# Patient Record
Sex: FEMALE | Race: WHITE | NOT HISPANIC OR LATINO | URBAN - METROPOLITAN AREA
[De-identification: names, ages, dates, MRNs, and addresses within clinical notes are randomized per-mention and may not be internally consistent; named-entity substitution may affect disease eponyms.]

---

## 2017-04-14 ENCOUNTER — OFFICE VISIT (OUTPATIENT)
Dept: FAMILY MEDICINE | Facility: CLINIC | Age: 27
End: 2017-04-14
Payer: COMMERCIAL

## 2017-04-14 VITALS
TEMPERATURE: 98.1 F | HEIGHT: 65 IN | DIASTOLIC BLOOD PRESSURE: 70 MMHG | WEIGHT: 139 LBS | SYSTOLIC BLOOD PRESSURE: 122 MMHG | BODY MASS INDEX: 23.16 KG/M2 | HEART RATE: 66 BPM

## 2017-04-14 DIAGNOSIS — Z00.00 ROUTINE GENERAL MEDICAL EXAMINATION AT A HEALTH CARE FACILITY: Primary | ICD-10-CM

## 2017-04-14 DIAGNOSIS — Z30.41 ENCOUNTER FOR SURVEILLANCE OF CONTRACEPTIVE PILLS: ICD-10-CM

## 2017-04-14 DIAGNOSIS — R53.83 FATIGUE, UNSPECIFIED TYPE: ICD-10-CM

## 2017-04-14 LAB
ERYTHROCYTE [DISTWIDTH] IN BLOOD BY AUTOMATED COUNT: 12.2 % (ref 10–15)
HCT VFR BLD AUTO: 40.2 % (ref 35–47)
HGB BLD-MCNC: 13.8 G/DL (ref 11.7–15.7)
MCH RBC QN AUTO: 31.1 PG (ref 26.5–33)
MCHC RBC AUTO-ENTMCNC: 34.3 G/DL (ref 31.5–36.5)
MCV RBC AUTO: 91 FL (ref 78–100)
PLATELET # BLD AUTO: 262 10E9/L (ref 150–450)
RBC # BLD AUTO: 4.44 10E12/L (ref 3.8–5.2)
WBC # BLD AUTO: 6.8 10E9/L (ref 4–11)

## 2017-04-14 PROCEDURE — 99395 PREV VISIT EST AGE 18-39: CPT | Performed by: FAMILY MEDICINE

## 2017-04-14 PROCEDURE — 36415 COLL VENOUS BLD VENIPUNCTURE: CPT | Performed by: FAMILY MEDICINE

## 2017-04-14 PROCEDURE — 84443 ASSAY THYROID STIM HORMONE: CPT | Performed by: FAMILY MEDICINE

## 2017-04-14 PROCEDURE — 85027 COMPLETE CBC AUTOMATED: CPT | Performed by: FAMILY MEDICINE

## 2017-04-14 PROCEDURE — 82306 VITAMIN D 25 HYDROXY: CPT | Performed by: FAMILY MEDICINE

## 2017-04-14 NOTE — NURSING NOTE
"Chief Complaint   Patient presents with     Physical       Initial /70 (BP Location: Right arm, Cuff Size: Adult Regular)  Pulse 66  Temp 98.1  F (36.7  C) (Oral)  Ht 5' 4.5\" (1.638 m)  Wt 139 lb (63 kg)  LMP 04/09/2017  BMI 23.49 kg/m2 Estimated body mass index is 23.49 kg/(m^2) as calculated from the following:    Height as of this encounter: 5' 4.5\" (1.638 m).    Weight as of this encounter: 139 lb (63 kg).  Medication Reconciliation: stefan RANDALL, Certified Medical Assistant (AAMA)April 14, 2017 2:08 PM      "

## 2017-04-14 NOTE — PROGRESS NOTES
SUBJECTIVE:     CC: Martine Do is an 26 year old woman who presents for preventive health visit.     Healthy Habits:    Do you get at least three servings of calcium containing foods daily (dairy, green leafy vegetables, etc.)? yes    Amount of exercise or daily activities, outside of work: 3-4 day(s) per week, roller derby 3 times a week, yoga, josie    Problems taking medications regularly No    Medication side effects: No    Have you had an eye exam in the past two years? yes    Do you see a dentist twice per year? yes    Do you have sleep apnea, excessive snoring or daytime drowsiness?yes    Wants to find out if she has any vitamin deficiencies.  Refill birth control.    Just got engaged last week!  Planning a fall or spring wedding.    Birth Control  Patient reports that her birth control switched from ortho cyclen to nortrel (same hormone composition just different manfacturer), period is longer and she gets worse cramps. She has been on this birth control for the past couple of months.     Fatigue  For several years, she reports feeling of tiredness. 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 She reports that sometimes she feels the need to sleep long hours during the day. Other times, however, she feels like she has a lot of energy. She hasn't noticed a pattern or what brings it on. When she sleeps, she mostly feels well rested when she wakes.     Today's PHQ-2 Score:   PHQ-2 ( 1999 [employer]) 4/14/2017 3/4/2016   Q1: Little interest or pleasure in doing things 0 0   Q2: Feeling down, depressed or hopeless 0 0   PHQ-2 Score 0 0       Abuse: Current or Past(Physical, Sexual or Emotional)- No  Do you feel safe in your environment - Yes    Social History   Substance Use Topics     Smoking status: Never Smoker     Smokeless tobacco: Never Used     Alcohol use Yes      Comment: 3 drinks per week     The patient does not drink >3 drinks per  day nor >7 drinks per week.    Recent Labs   Lab Test  11/04/13   0758   CHOL  172   HDL  49*   LDL  102   TRIG  104   CHOLHDLRATIO  3.5       Reviewed orders with patient.  Reviewed health maintenance and updated orders accordingly - Yes    Mammo Decision Support:  Mammogram not appropriate for this patient based on age.    Pertinent mammograms are reviewed under the imaging tab.  History of abnormal Pap smear: NO - age 21-29 PAP every 3 years recommended    Reviewed and updated as needed this visit by clinical staff  Tobacco  Allergies  Med Hx  Surg Hx  Fam Hx  Soc Hx        Reviewed and updated as needed this visit by Provider            ROS:  C: NEGATIVE for fever, chills, change in weight  I: NEGATIVE for worrisome rashes, moles or lesions  E: NEGATIVE for vision changes or irritation  ENT: NEGATIVE for ear, mouth and throat problems  R: NEGATIVE for significant cough or SOB  B: NEGATIVE for masses, tenderness or discharge  CV: NEGATIVE for chest pain, palpitations or peripheral edema  GI: NEGATIVE for nausea, abdominal pain, heartburn, or change in bowel habits  : NEGATIVE for unusual urinary or vaginal symptoms. Periods are regular.  M: NEGATIVE for significant arthralgias or myalgia  N: NEGATIVE for weakness, dizziness or paresthesias  P: NEGATIVE for changes in mood or affect    This document serves as a record of the services and decisions personally performed and made by Niharika Stewart MD. It was created on her/his behalf by Kenna Mckeon, a trained medical scribe. The creation of this document is based the provider's statements to the medical scribe.  Kenna Mckeon April 14, 2017 2:24 PM    Problem list, Medication list, Allergies, and Medical/Social/Surgical histories reviewed in [facility] and updated as appropriate.  BP Readings from Last 3 Encounters:   04/14/17 122/70   03/04/16 112/68   12/10/14 104/64    Wt Readings from Last 3 Encounters:   04/14/17 63 kg (139 lb)   03/04/16 63.7 kg (140 lb  "6.4 oz)   12/10/14 61.2 kg (135 lb)         OBJECTIVE:     /70 (BP Location: Right arm, Cuff Size: Adult Regular)  Pulse 66  Temp 98.1  F (36.7  C) (Oral)  Ht 1.638 m (5' 4.5\")  Wt 63 kg (139 lb)  LMP 04/09/2017  BMI 23.49 kg/m2  EXAM:  GENERAL: healthy, alert and no distress  EYES: Eyes grossly normal to inspection, PERRL and conjunctivae and sclerae normal  HENT: ear canals and TM's normal, nose and mouth without ulcers or lesions  NECK: no adenopathy, no asymmetry, masses, or scars and thyroid normal to palpation  RESP: lungs clear to auscultation - no rales, rhonchi or wheezes  BREAST: normal without masses, tenderness or nipple discharge and no palpable axillary masses or adenopathy  CV: regular rate and rhythm, normal S1 S2, no S3 or S4, no murmur, click or rub, no peripheral edema and peripheral pulses strong  ABDOMEN: soft, nontender, no hepatosplenomegaly, no masses and bowel sounds normal  MS: no gross musculoskeletal defects noted, no edema  SKIN: no suspicious lesions or rashes  NEURO: Normal strength and tone, mentation intact and speech normal  PSYCH: mentation appears normal, affect normal/bright    ASSESSMENT/PLAN:     1. Routine general medical examination at a health care facility  Healthy.   Other health care maintenance up to date per chart or patient report.    - norethindrone-ethinyl estradiol (ORTHO-NOVUM 1-35 TAB,NORTREL 1-35 TAB) 1-35 MG-MCG per tablet; Take 1 tablet by mouth daily  Dispense: 84 tablet; Refill: 4    2. Encounter for surveillance of contraceptive pills  Tolerating ocp's, although new brand has caused periods to be longer.  Advised to call in 1-2 more months if still unsatisfied and we can consider brand only with ortho cyclen.    3. Fatigue, unspecified type  Wonder if this may be sleep insufficiency or menstrual cycle related.  adjust therapy based on labs  Otherwise consider the above potential causes  - CBC with platelets  - TSH with free T4 reflex  - Vitamin D " "Deficiency      COUNSELING:   Reviewed preventive health counseling, as reflected in patient instructions       Regular exercise     reports that she has never smoked. She has never used smokeless tobacco.    Estimated body mass index is 23.49 kg/(m^2) as calculated from the following:    Height as of this encounter: 1.638 m (5' 4.5\").    Weight as of this encounter: 63 kg (139 lb).     Counseling Resources:  ATP IV Guidelines  Pooled Cohorts Equation Calculator  Breast Cancer Risk Calculator  FRAX Risk Assessment  ICSI Preventive Guidelines  Dietary Guidelines for Americans, 2010  USDA's MyPlate  ASA Prophylaxis  Lung CA Screening    Niharika Cortes MD  [facility]    This document serves as a record of the services and decisions personally performed and made by Niharika Cortes MD. It was created on her/his behalf by Kenna Mckeon, a trained medical scribe. The creation of this document is based the provider's statements to the medical scribe.  Kenna Mckeon April 14, 2017 2:24 PM      "

## 2017-04-14 NOTE — LETTER
[address] [address] [phone]      April 18, 2017      Martine Do  [address] [place] [id number]          Dear Ms. Do    The results of your recent lab tests were within normal limits. Enclosed is a copy of these results.  If you have any further questions or problems, please contact our office.  Results for orders placed or performed in visit on 04/14/17   CBC with platelets   Result Value Ref Range    WBC 6.8 4.0 - 11.0 10e9/L    RBC Count 4.44 3.8 - 5.2 10e12/L    Hemoglobin 13.8 11.7 - 15.7 g/dL    Hematocrit 40.2 35.0 - 47.0 %    MCV 91 78 - 100 fl    MCH 31.1 26.5 - 33.0 pg    MCHC 34.3 31.5 - 36.5 g/dL    RDW 12.2 10.0 - 15.0 %    Platelet Count 262 150 - 450 10e9/L   TSH with free T4 reflex   Result Value Ref Range    TSH 1.47 0.40 - 4.00 mU/L   Vitamin D Deficiency   Result Value Ref Range    Vitamin D Deficiency screening 47 20 - 75 ug/L       Sincerely,      Shazia Stewart MD/dell

## 2017-04-14 NOTE — MR AVS SNAPSHOT
After Visit Summary   4/14/2017    Martine Do    MRN: [id number]           Patient Information     Date Of Birth          1990        Visit Information        Provider Department      4/14/2017 2:00 PM Niharika Cortes MD [facility]        Today's Diagnoses     Routine general medical examination at a health care facility    -  1    Encounter for surveillance of contraceptive pills        Fatigue, unspecified type          Care Instructions    If your lab tests are normal, you may want to track and see if the increased fatigue is occurring the week before your period.      Preventive Health Recommendations  Female Ages 26 - 39  Yearly exam:   See your health care provider every year in order to    Review health changes.     Discuss preventive care.      Review your medicines if you your doctor has prescribed any.    Until age 30: Get a Pap test every three years (more often if you have had an abnormal result).    After age 30: Talk to your doctor about whether you should have a Pap test every 3 years or have a Pap test with HPV screening every 5 years.   You do not need a Pap test if your uterus was removed (hysterectomy) and you have not had cancer.  You should be tested each year for STDs (sexually transmitted diseases), if you're at risk.   Talk to your provider about how often to have your cholesterol checked.  If you are at risk for diabetes, you should have a diabetes test (fasting glucose).  Shots: Get a flu shot each year. Get a tetanus shot every 10 years.   Nutrition:     Eat at least 5 servings of fruits and vegetables each day.    Eat whole-grain bread, whole-wheat pasta and brown rice instead of white grains and rice.    Talk to your provider about Calcium and Vitamin D.     Lifestyle    Exercise at least 150 minutes a week (30 minutes a day, 5 days of the week). This will help you control your weight and prevent disease.    Limit alcohol to one drink per  "day.    No smoking.     Wear sunscreen to prevent skin cancer.    See your dentist every six months for an exam and cleaning.            Follow-ups after your visit        Who to contact     If you have questions or need follow up information about today's clinic visit or your schedule please contact [facility] directly at [phone].  Normal or non-critical lab and imaging results will be communicated to you by MyChart, letter or phone within 4 business days after the clinic has received the results. If you do not hear from us within 7 days, please contact the clinic through [employer] or phone. If you have a critical or abnormal lab result, we will notify you by phone as soon as possible.  Submit refill requests through [employer] or call your pharmacy and they will forward the refill request to us. Please allow 3 business days for your refill to be completed.          Additional Information About Your Visit        [employer] Information     [employer] lets you send messages to your doctor, view your test results, renew your prescriptions, schedule appointments and more. To sign up, go to [email or web address] . Click on \"Log in\" on the left side of the screen, which will take you to the Welcome page. Then click on \"Sign up Now\" on the right side of the page.     You will be asked to enter the access code listed below, as well as some personal information. Please follow the directions to create your username and password.     Your access code is: [email or web address]  Expires: 2017  3:16 PM     Your access code will  in 90 days. If you need help or a new code, please call your [facility] clinic or [phone].        Care EveryWhere ID     This is your Care EveryWhere ID. This could be used by other organizations to access your [facility] medical records  [id number]        Your Vitals Were     Pulse Temperature Height Last Period BMI (Body Mass Index)       66 98.1  F (36.7  C) (Oral) 5' 4.5\" (1.638 " m) 04/09/2017 23.49 kg/m2        Blood Pressure from Last 3 Encounters:   04/14/17 122/70   03/04/16 112/68   12/10/14 104/64    Weight from Last 3 Encounters:   04/14/17 139 lb (63 kg)   03/04/16 140 lb 6.4 oz (63.7 kg)   12/10/14 135 lb (61.2 kg)              We Performed the Following     CBC with platelets     TSH with free T4 reflex     Vitamin D Deficiency          Where to get your medicines      These medications were sent to [facility] - [place], [place] - [address]  [address] [place] [address]     Phone:  [phone]     norethindrone-ethinyl estradiol 1-35 MG-MCG per tablet          Primary Care Provider Office Phone # Fax #    Niharika Cortes [phone]       [facility] [address] [facility] [address]        Thank you!     Thank you for choosing [facility]  for your care. Our goal is always to provide you with excellent care. Hearing back from our patients is one way we can continue to improve our services. Please take a few minutes to complete the written survey that you may receive in the mail after your visit with us. Thank you!             Your Updated Medication List - Protect others around you: Learn how to safely use, store and throw away your medicines at www.disposemymeds.org.          This list is accurate as of: 4/14/17  3:16 PM.  Always use your most recent med list.                   Brand Name Dispense Instructions for use    norethindrone-ethinyl estradiol 1-35 MG-MCG per tablet    ORTHO-NOVUM 1-35 TAB,NORTREL 1-35 TAB    84 tablet    Take 1 tablet by mouth daily

## 2017-04-14 NOTE — PATIENT INSTRUCTIONS
If your lab tests are normal, you may want to track and see if the increased fatigue is occurring the week before your period.      Preventive Health Recommendations  Female Ages 26 - 39  Yearly exam:   See your health care provider every year in order to    Review health changes.     Discuss preventive care.      Review your medicines if you your doctor has prescribed any.    Until age 30: Get a Pap test every three years (more often if you have had an abnormal result).    After age 30: Talk to your doctor about whether you should have a Pap test every 3 years or have a Pap test with HPV screening every 5 years.   You do not need a Pap test if your uterus was removed (hysterectomy) and you have not had cancer.  You should be tested each year for STDs (sexually transmitted diseases), if you're at risk.   Talk to your provider about how often to have your cholesterol checked.  If you are at risk for diabetes, you should have a diabetes test (fasting glucose).  Shots: Get a flu shot each year. Get a tetanus shot every 10 years.   Nutrition:     Eat at least 5 servings of fruits and vegetables each day.    Eat whole-grain bread, whole-wheat pasta and brown rice instead of white grains and rice.    Talk to your provider about Calcium and Vitamin D.     Lifestyle    Exercise at least 150 minutes a week (30 minutes a day, 5 days of the week). This will help you control your weight and prevent disease.    Limit alcohol to one drink per day.    No smoking.     Wear sunscreen to prevent skin cancer.    See your dentist every six months for an exam and cleaning.

## 2017-04-15 LAB — TSH SERPL DL<=0.005 MIU/L-ACNC: 1.47 MU/L (ref 0.4–4)

## 2017-04-17 LAB — DEPRECATED CALCIDIOL+CALCIFEROL SERPL-MC: 47 UG/L (ref 20–75)

## 2017-07-27 ENCOUNTER — OFFICE VISIT (OUTPATIENT)
Dept: PODIATRY | Facility: CLINIC | Age: 27
End: 2017-07-27
Payer: COMMERCIAL

## 2017-07-27 ENCOUNTER — RADIANT APPOINTMENT (OUTPATIENT)
Dept: GENERAL RADIOLOGY | Facility: CLINIC | Age: 27
End: 2017-07-27
Attending: PODIATRIST
Payer: COMMERCIAL

## 2017-07-27 VITALS — BODY MASS INDEX: 22.49 KG/M2 | HEART RATE: 70 BPM | WEIGHT: 135 LBS | HEIGHT: 65 IN

## 2017-07-27 DIAGNOSIS — S90.32XA CONTUSION OF LEFT FOOT, INITIAL ENCOUNTER: ICD-10-CM

## 2017-07-27 DIAGNOSIS — S99.922A INJURY OF LEFT FOOT, INITIAL ENCOUNTER: Primary | ICD-10-CM

## 2017-07-27 PROCEDURE — 99203 OFFICE O/P NEW LOW 30 MIN: CPT | Performed by: PODIATRIST

## 2017-07-27 PROCEDURE — 73630 X-RAY EXAM OF FOOT: CPT | Mod: LT

## 2017-07-27 NOTE — LETTER
2017         RE: Martine Do  [address] [place] [id number]        Dear Colleague,    Thank you for referring your patient, Martine Do, to [facility]. Please see a copy of my visit note below.      ASSESSMENT/PLAN:    Encounter Diagnoses   Name Primary?     Injury of left foot, initial encounter Yes     Contusion of left foot, initial encounter      We reviewed the x-rays together and I discussed the regional anatomy.     Recommendations:    Cold application  Supportive athletic shoes until pain is resolved.  Trilok ankle brace    Follow up if pain persists or worsens.      Body mass index is 22.81 kg/(m^2).    Stanley Ojeda, AALIYAH, FACFAS, MS    [place] [facility] Pod[facility]/Foot & Ankle [facility]      ____________________________________________________________________    HPI:         Chief Complaint: left foot injury; concerned for broken left foot  Onset of problem: 3.5 days:  She was playing softball, fielding a ball and it hit her left foot over the lateral aspect.  She is concerned, because she also feels some pain in the plantar arch.   Pain/ discomfort is described as:  Ache, shotting  Ratin/10   Frequency:  daily    The pain is made worse with walking, movement  Previous treatment: ice, foot soaks -Epsom salt    *  Past Medical History:   Diagnosis Date     Other acne    *  *  Past Surgical History:   Procedure Laterality Date     NO HISTORY OF SURGERY     *  *  Current Outpatient Prescriptions   Medication Sig Dispense Refill     norethindrone-ethinyl estradiol (ORTHO-NOVUM 1-35 TAB,NORTREL 1-35 TAB) 1-35 MG-MCG per tablet Take 1 tablet by mouth daily 84 tablet 4       ROS:     A 10-point review of systems was performed and is positive for that noted in the HPI and as seen below.  All other areas are negative.     Numbness in feet?  yes   Calf pain with walking? no  Recent foot/ankle injury? See HPI  Weight change over past 12 months? no  Self perception as  "overweight? no  Recent flu-like symptoms? no  Joint pain other than feet ? no    Social History: Employment:  , ;  Exercise/Physical activity:  Roller derby, softball, gym, yoga;  Tobacco use:  no  Social History     Social History     Marital status: Single     Spouse name: N/A     Number of children: N/A     Years of education: N/A     Occupational History     Not on file.     Social History Main Topics     Smoking status: Never Smoker     Smokeless tobacco: Never Used     Alcohol use Yes      Comment: 3 drinks per week     Drug use: No     Sexual activity: Yes     Partners: Male     Birth control/ protection: Pill, Condom     Other Topics Concern     Not on file     Social History Narrative       Family history:  Family History   Problem Relation Age of Onset     Neurologic Disorder Paternal Grandmother      Prostate Cancer Paternal Grandfather      C.A.D. Father 42     stents     Family History Negative No family hx of        Rheumatoid arthritis:  no  Foot Problems: parent, grandparent - bunions  Diabetes: no      EXAM:    Vitals: Pulse 70  Ht 5' 4.5\" (1.638 m)  Wt 135 lb (61.2 kg)  BMI 22.81 kg/m2  BMI: Body mass index is 22.81 kg/(m^2).  Height: 5' 4.5\"    Constitutional/ general:  Pt is in no apparent distress, appears well-nourished.  Cooperative with history and physical exam.     Vascular:  Pedal pulses are palpable bilaterally for both the DP and PT arteries.  CFT < 3 sec.  No edema.  Pedal hair growth noted.     Neuro:  Alert and oriented x 3. Coordinated gait.  Light touch sensation is intact to the L4, L5, S1 distributions. No obvious deficits.  No evidence of neurological-based weakness, spasticity, or contracture in the lower extremities.     Derm: Normal texture and turgor.  No erythema, ecchymosis, or cyanosis.  No open lesions.     Musculoskeletal:    Lower extremity muscle strength is normal.  Patient is ambulatory without an assistive device or brace .  No gross " deformities.  I was not able to reproduce pain on palpation, but she pointed to the lateral left foot near the cuboid bone and also mid arch on the plantar aspect.      Radiographic Exam:  X-Ray Findings:  I personally reviewed the films.  4 views of the left foot, negative for fracture and dislocation.       Stanley Ojeda DPM, FACFAS, MS    [facility] Podiatry/Foot & Ankle Surgery                Again, thank you for allowing me to participate in the care of your patient.        Sincerely,        Stanley Ojeda DPM

## 2017-07-27 NOTE — MR AVS SNAPSHOT
After Visit Summary   7/27/2017    Martine Do    MRN: [id number]           Patient Information     Date Of Birth          1990        Visit Information        Provider Department      7/27/2017 10:30 AM Stanley Denise DPM [facility]        Today's Diagnoses     Injury of left foot, initial encounter    -  1    Contusion of left foot, initial encounter          Care Instructions    DR. DENISE'S CLINIC LOCATIONS     MONDAY / FRIDAY - [facility] WEDNESDAY - [address] [address] [place] [address]   [phone] / [phone] / [phone]       [facility] [phone]   [address] APPOINTMENTS: [phone]   [place] [address] AFTER HOURS: [phone] / [phone] BILLING QUESTIONS: [phone]      PRICE THERAPY    Many aches and pains throughout the foot and ankle can be helped with many simple treatments. This is usually described as PRICE Therapy.      P - Protection - often times, inflammation/pain in the lower extremity is not able to improve simply because the areas involved are never allowed to rest. Every step we take can bother the problematic area. Protecting those areas is an important step in the healing process. This may involve a walking cast boot, a special insert/orthotic device, an ankle brace, or simply avoiding barefoot walking.    R - Rest - in addition to protecting the foot/ankle, resting is an important, but often times difficult, treatment option. Getting off your feet when they bother you, and specifically avoiding activities that cause pain/discomfort, are very beneficial to prevent, and treat, foot/ankle pain.      I - Ice - icing regularly can help to decrease inflammation and swelling in the foot, thus decreasing pain. Using an ice pack or a bag of frozen veggies works very well. Ice for 20 minutes multiple times per  day as needed.  Do not place the ice directly on the skin as this can cause tissue damage.    C - Compression - using a compression wrap or an ACE wrap can help to decrease swelling, which can help to decrease pain. Wearing the wraps is generally not needed at night, but they should be worn on a regular basis when you are going to be on your feet for prolonged periods as gravity tends to pull fluids down to your feet/ankles.    E - Elevation - elevating your lower extremities multiple times daily for 15-20 minutes can help to decrease swelling, which works well in decreasing pain levels.    NSAID/Tylenol - Anti-inflammatories like Aleve or ibuprofen, and/or a pain medication, such as Tylenol, can help to improve pain levels and get the issue resolved sooner rather than later. Anyone with liver issues should be careful with Tylenol, and anyone with high blood pressure or heart, stomach or kidney issues should be careful with anti-inflammatories. Please ask if you have questions about these medications, including dosage.          Body Mass Index (BMI)  Many things can cause foot and ankle problems. Foot structure, activity level, foot mechanics and injuries are common causes of pain.  One very important issue that often goes unmentioned, is body weight.  Extra weight can cause increased stress on muscles, ligaments, bones and tendons.  Sometimes just a few extra pounds is all it takes to put one over her/his threshold. Without reducing that stress, it can be difficult to alleviate pain. Some people are uncomfortable addressing this issue, but we feel it is important for you to think about it. As Foot &  Ankle specialists, our job is addressing the lower extremity problem and possible causes. Regarding extra body weight, we encourage patients to discuss diet and weight management plans with their primary care doctors. It is this team approach that gives you the best opportunity for pain relief and getting you back on  "your feet.                Follow-ups after your visit        Who to contact     If you have questions or need follow up information about today's clinic visit or your schedule please contact [facility] directly at [phone].  Normal or non-critical lab and imaging results will be communicated to you by MyChart, letter or phone within 4 business days after the clinic has received the results. If you do not hear from us within 7 days, please contact the clinic through MyChart or phone. If you have a critical or abnormal lab result, we will notify you by phone as soon as possible.  Submit refill requests through [facility] or call your pharmacy and they will forward the refill request to us. Please allow 3 business days for your refill to be completed.          Additional Information About Your Visit        [employer] Information     [facility] lets you send messages to your doctor, view your test results, renew your prescriptions, schedule appointments and more. To sign up, go to [email or web address] . Click on \"Log in\" on the left side of the screen, which will take you to the Welcome page. Then click on \"Sign up Now\" on the right side of the page.     You will be asked to enter the access code listed below, as well as some personal information. Please follow the directions to create your username and password.     Your access code is: [email or web address]  Expires: 10/25/2017 11:27 AM     Your access code will  in 90 days. If you need help or a new code, please call your [facility] clinic or [phone].        Care EveryWhere ID     This is your Care EveryWhere ID. This could be used by other organizations to access your [facility] medical records  [id number]        Your Vitals Were     Pulse Height BMI (Body Mass Index)             70 5' 4.5\" (1.638 m) 22.81 kg/m2          Blood Pressure from Last 3 Encounters:   17 122/70   16 112/68   12/10/14 104/64    Weight from Last 3 Encounters: "   07/27/17 135 lb (61.2 kg)   04/14/17 139 lb (63 kg)   03/04/16 140 lb 6.4 oz (63.7 kg)              We Performed the Following     XR Foot Left G/E 3 Views          Today's Medication Changes          These changes are accurate as of: 7/27/17 11:27 AM.  If you have any questions, ask your nurse or doctor.               Start taking these medicines.        Dose/Directions    order for DME   Used for:  Contusion of left foot, initial encounter, Injury of left foot, initial encounter   Started by:  Stanley Ojeda DPM        Trilok ankle brace   Quantity:  1 Device   Refills:  0            Where to get your medicines      Some of these will need a paper prescription and others can be bought over the counter.  Ask your nurse if you have questions.     Bring a paper prescription for each of these medications     order for DME                Primary Care Provider Office Phone # Fax #    Niharika Nerissa Cortes -[phone]       [address] [address]        Equal Access to Services     [facility] DIANA : Hadii alecia ku hadasho Soomaali, waaxda luqadaha, qaybta kaalmada adeegyada, cosme bateman haycoty moreno . So [facility] [phone].    ATENCIÓN: Si habla español, tiene a ware disposición servicios gratuitos de asistencia lingüística. Llame al [phone].    We comply with applicable federal civil rights laws and [place] laws. We do not discriminate on the basis of race, color, national origin, age, disability sex, sexual orientation or gender identity.            Thank you!     Thank you for choosing [facility]  for your care. Our goal is always to provide you with excellent care. Hearing back from our patients is one way we can continue to improve our services. Please take a few minutes to complete the written survey that you may receive in the mail after your visit with us. Thank you!             Your Updated Medication List -  Protect others around you: Learn how to safely use, store and throw away your medicines at www.disposemymeds.org.          This list is accurate as of: 7/27/17 11:27 AM.  Always use your most recent med list.                   Brand Name Dispense Instructions for use Diagnosis    norethindrone-ethinyl estradiol 1-35 MG-MCG per tablet    ORTHO-NOVUM 1-35 TAB,NORTREL 1-35 TAB    84 tablet    Take 1 tablet by mouth daily    Routine general medical examination at a health care facility       order for DME     1 Device    Trilok ankle brace    Contusion of left foot, initial encounter, Injury of left foot, initial encounter

## 2017-07-27 NOTE — PATIENT INSTRUCTIONS
DR. DENISE'S CLINIC LOCATIONS     MONDAY / FRIDAY - [facility] WEDNESDAY - [place]   [address] [place] [address] [place] [address]   [phone] / [phone] / [phone]       THURSDAY - [facility] SCHEDULE SURGERY: [phone]   [address] APPOINTMENTS: [phone]   [place] [address] AFTER HOURS: [phone] / [phone] BILLING QUESTIONS: [phone]      PRICE THERAPY    Many aches and pains throughout the foot and ankle can be helped with many simple treatments. This is usually described as PRICE Therapy.      P - Protection - often times, inflammation/pain in the lower extremity is not able to improve simply because the areas involved are never allowed to rest. Every step we take can bother the problematic area. Protecting those areas is an important step in the healing process. This may involve a walking cast boot, a special insert/orthotic device, an ankle brace, or simply avoiding barefoot walking.    R - Rest - in addition to protecting the foot/ankle, resting is an important, but often times difficult, treatment option. Getting off your feet when they bother you, and specifically avoiding activities that cause pain/discomfort, are very beneficial to prevent, and treat, foot/ankle pain.      I - Ice - icing regularly can help to decrease inflammation and swelling in the foot, thus decreasing pain. Using an ice pack or a bag of frozen veggies works very well. Ice for 20 minutes multiple times per day as needed.  Do not place the ice directly on the skin as this can cause tissue damage.    C - Compression - using a compression wrap or an ACE wrap can help to decrease swelling, which can help to decrease pain. Wearing the wraps is generally not needed at night, but they should be worn on a regular basis when you are going to be on your feet for prolonged periods as gravity tends to pull fluids down to your  feet/ankles.    E - Elevation - elevating your lower extremities multiple times daily for 15-20 minutes can help to decrease swelling, which works well in decreasing pain levels.    NSAID/Tylenol - Anti-inflammatories like Aleve or ibuprofen, and/or a pain medication, such as Tylenol, can help to improve pain levels and get the issue resolved sooner rather than later. Anyone with liver issues should be careful with Tylenol, and anyone with high blood pressure or heart, stomach or kidney issues should be careful with anti-inflammatories. Please ask if you have questions about these medications, including dosage.          Body Mass Index (BMI)  Many things can cause foot and ankle problems. Foot structure, activity level, foot mechanics and injuries are common causes of pain.  One very important issue that often goes unmentioned, is body weight.  Extra weight can cause increased stress on muscles, ligaments, bones and tendons.  Sometimes just a few extra pounds is all it takes to put one over her/his threshold. Without reducing that stress, it can be difficult to alleviate pain. Some people are uncomfortable addressing this issue, but we feel it is important for you to think about it. As Foot &  Ankle specialists, our job is addressing the lower extremity problem and possible causes. Regarding extra body weight, we encourage patients to discuss diet and weight management plans with their primary care doctors. It is this team approach that gives you the best opportunity for pain relief and getting you back on your feet.

## 2017-07-27 NOTE — NURSING NOTE
"Chief Complaint   Patient presents with     Consult     Foot Pain     left / x 3 days ago / hit with a softball       Initial Pulse 70  Ht 5' 4.5\" (1.638 m)  Wt 135 lb (61.2 kg)  BMI 22.81 kg/m2 Estimated body mass index is 22.81 kg/(m^2) as calculated from the following:    Height as of this encounter: 5' 4.5\" (1.638 m).    Weight as of this encounter: 135 lb (61.2 kg).  Medication Reconciliation: complete    "

## 2017-07-27 NOTE — PROGRESS NOTES
ASSESSMENT/PLAN:    Encounter Diagnoses   Name Primary?     Injury of left foot, initial encounter Yes     Contusion of left foot, initial encounter      We reviewed the x-rays together and I discussed the regional anatomy.     Recommendations:    Cold application  Supportive athletic shoes until pain is resolved.  Trilok ankle brace    Follow up if pain persists or worsens.      Body mass index is 22.81 kg/(m^2).    Stanley Ojeda DPM, FACFAS, MS    [place] [facility] of Podiatry/Foot & Ankle Surgery      ____________________________________________________________________    HPI:         Chief Complaint: left foot injury; concerned for broken left foot  Onset of problem: 3.5 days:  She was playing softball, fielding a ball and it hit her left foot over the lateral aspect.  She is concerned, because she also feels some pain in the plantar arch.   Pain/ discomfort is described as:  Ache, shotting  Ratin/10   Frequency:  daily    The pain is made worse with walking, movement  Previous treatment: ice, foot soaks -Epsom salt    *  Past Medical History:   Diagnosis Date     Other acne    *  *  Past Surgical History:   Procedure Laterality Date     NO HISTORY OF SURGERY     *  *  Current Outpatient Prescriptions   Medication Sig Dispense Refill     norethindrone-ethinyl estradiol (ORTHO-NOVUM 1-35 TAB,NORTREL 1-35 TAB) 1-35 MG-MCG per tablet Take 1 tablet by mouth daily 84 tablet 4       ROS:     A 10-point review of systems was performed and is positive for that noted in the HPI and as seen below.  All other areas are negative.     Numbness in feet?  yes   Calf pain with walking? no  Recent foot/ankle injury? See HPI  Weight change over past 12 months? no  Self perception as overweight? no  Recent flu-like symptoms? no  Joint pain other than feet ? no    Social History: Employment:  , [employer];  Exercise/Physical activity:  Roller derby, softball, gym, yoga;  Tobacco use:  no  Social History  "    Social History     Marital status: Single     Spouse name: N/A     Number of children: N/A     Years of education: N/A     Occupational History     Not on file.     Social History Main Topics     Smoking status: Never Smoker     Smokeless tobacco: Never Used     Alcohol use Yes      Comment: 3 drinks per week     Drug use: No     Sexual activity: Yes     Partners: Male     Birth control/ protection: Pill, Condom     Other Topics Concern     Not on file     Social History Narrative       Family history:  Family History   Problem Relation Age of Onset     Neurologic Disorder Paternal Grandmother      Prostate Cancer Paternal Grandfather      C.A.D. Father 42     stents     Family History Negative No family hx of        Rheumatoid arthritis:  no  Foot Problems: parent, grandparent - bunions  Diabetes: no      EXAM:    Vitals: Pulse 70  Ht 5' 4.5\" (1.638 m)  Wt 135 lb (61.2 kg)  BMI 22.81 kg/m2  BMI: Body mass index is 22.81 kg/(m^2).  Height: 5' 4.5\"    Constitutional/ general:  Pt is in no apparent distress, appears well-nourished.  Cooperative with history and physical exam.     Vascular:  Pedal pulses are palpable bilaterally for both the DP and PT arteries.  CFT < 3 sec.  No edema.  Pedal hair growth noted.     Neuro:  Alert and oriented x 3. Coordinated gait.  Light touch sensation is intact to the L4, L5, S1 distributions. No obvious deficits.  No evidence of neurological-based weakness, spasticity, or contracture in the lower extremities.     Derm: Normal texture and turgor.  No erythema, ecchymosis, or cyanosis.  No open lesions.     Musculoskeletal:    Lower extremity muscle strength is normal.  Patient is ambulatory without an assistive device or brace .  No gross deformities.  I was not able to reproduce pain on palpation, but she pointed to the lateral left foot near the cuboid bone and also mid arch on the plantar aspect.      Radiographic Exam:  X-Ray Findings:  I personally reviewed the films.  4 " views of the left foot, negative for fracture and dislocation.       Stanley Ojeda DPM, FACFAS, MS    [facility] Department of Podiatry/Foot & Ankle Surgery

## 2017-10-03 ENCOUNTER — TRANSFERRED RECORDS (OUTPATIENT)
Dept: HEALTH INFORMATION MANAGEMENT | Facility: CLINIC | Age: 27
End: 2017-10-03

## 2018-07-05 DIAGNOSIS — Z00.00 ROUTINE GENERAL MEDICAL EXAMINATION AT A HEALTH CARE FACILITY: ICD-10-CM

## 2018-07-05 RX ORDER — NORETHINDRONE AND ETHINYL ESTRADIOL 1 MG-35MCG
KIT ORAL
Qty: 84 TABLET | Refills: 0 | Status: SHIPPED | OUTPATIENT
Start: 2018-07-05 | End: 2018-09-06

## 2018-07-05 NOTE — TELEPHONE ENCOUNTER
"Requested Prescriptions   Pending Prescriptions Disp Refills     ALAYCEN 1/35 1-35 MG-MCG per tablet [Pharmacy Med Name: ALYACEN 1-35-28 TABLET]  Last Written Prescription Date:  4/14/2017  Last Fill Quantity: 84 tablet,  # refills: 4   Last office visit: 4/14/2017 with prescribing provider:  KENNETH Cortes   Future Office Visit:   Next 5 appointments (look out 90 days)     Sep 06, 2018  4:00 PM CDT   PHYSICAL with Niharika Cortes MD   [facility]    [address] [place] [id number] [phone]                  84 tablet 2     Sig: TAKE ONE TABLET BY MOUTH ONCE DAILY    Contraceptives Protocol Failed    7/5/2018  9:19 AM       Failed - Recent (12 mo) or future (30 days) visit within the authorizing provider's specialty    Patient had office visit in the last 12 months or has a visit in the next 30 days with authorizing provider or within the authorizing provider's specialty.  See \"Patient Info\" tab in inbasket, or \"Choose Columns\" in Meds & Orders section of the refill encounter.           Passed - Patient is not a current smoker if age is 35 or older       Passed - No active pregnancy on record       Passed - No positive pregnancy test in past 12 months          "

## 2018-07-16 ENCOUNTER — OFFICE VISIT (OUTPATIENT)
Dept: PEDIATRICS | Facility: CLINIC | Age: 28
End: 2018-07-16
Payer: COMMERCIAL

## 2018-07-16 VITALS
SYSTOLIC BLOOD PRESSURE: 108 MMHG | OXYGEN SATURATION: 99 % | TEMPERATURE: 97.5 F | BODY MASS INDEX: 24.41 KG/M2 | HEIGHT: 65 IN | DIASTOLIC BLOOD PRESSURE: 60 MMHG | HEART RATE: 71 BPM | WEIGHT: 146.5 LBS
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
## 2018-07-16 DIAGNOSIS — S09.92XA NASAL INJURY, INITIAL ENCOUNTER: Primary | ICD-10-CM

## 2018-07-16 PROCEDURE — 99214 OFFICE O/P EST MOD 30 MIN: CPT | Performed by: PHYSICIAN ASSISTANT

## 2018-07-16 NOTE — PROGRESS NOTES
"  SUBJECTIVE:   Martine Lewis is a 28 year old female who presents to clinic today for the following health issues:    Patient was playing roller derby, fell and landed on face. Nose took the force.   Immediate pain. No bleeding.    No headache or LOC. No amnesia.   Swelling immediately. Bruising the next day.   Able to breathe through nose.   Pain: improving daily.  Therapies Tried and outcome: Ice, tylenol, ibuprofen    No other injuries sustained.     ROS:  ROS otherwise negative    OBJECTIVE:                                                    /60 (BP Location: Right arm, Cuff Size: Adult Regular)  Pulse 71  Temp 97.5  F (36.4  C) (Oral)  Ht 5' 4.5\" (1.638 m)  Wt 146 lb 8 oz (66.5 kg)  SpO2 99%  BMI 24.76 kg/m2  Body mass index is 24.76 kg/(m^2).   GENERAL: alert, no distress  Eyes:  Eyes grossly normal to inspection, PERRL and conjunctivae and sclerae normal  NOSE: swelling and mild redness over the dorsum of nose. No crepitus noted. No tenderness over the orbital bone. No hematomas or bleeding present in nares  HENT: ear canals- normal; TMs- normal; Mouth- no ulcers, no lesions  NECK: no tenderness, full ROM and strength  RESP: lungs clear to auscultation - no rales, no rhonchi, no wheezes  CV: regular rates and rhythm, normal S1 S2, no S3 or S4 and no murmur, no click or rub  Neuro:  mentation intact and speech normal    Diagnostic test results:  No results found for this or any previous visit (from the past 24 hour(s)).     ASSESSMENT/PLAN:                                                    (S09.92XA) Nasal injury, initial encounter  (primary encounter diagnosis)  Comment: continue with ice and ibuprofen. Pain is overall improving. Signs for emergent evaluation discussed with patient.  Plan:     (W19.XXXA) Fall, initial encounter  Comment:   Plan:     Naun Capone PA-C  [facility] [place]  "

## 2018-07-16 NOTE — MR AVS SNAPSHOT
After Visit Summary   7/16/2018    Martine Lewis    MRN: [id number]           Patient Information     Date Of Birth          1990        Visit Information        Provider Department      7/16/2018 1:30 PM Naun Capone PA-C [facility] ([facility]        Care Instructions    Begin ibuprofen and ice  Call with any questions          Follow-ups after your visit        Your next 10 appointments already scheduled     Sep 06, 2018  4:00 PM CDT   PHYSICAL with Niharika Cortes MD   [facility]    [address] [place] [id number] [phone]              Who to contact     If you have questions or need follow up information about today's clinic visit or your schedule please contact [facility] directly at [phone].  Normal or non-critical lab and imaging results will be communicated to you by MyChart, letter or phone within 4 business days after the clinic has received the results. If you do not hear from us within 7 days, please contact the clinic through MyChart or phone. If you have a critical or abnormal lab result, we will notify you by phone as soon as possible.  Submit refill requests through [employer] or call your pharmacy and they will forward the refill request to us. Please allow 3 business days for your refill to be completed.          Additional Information About Your Visit        MyChart Information     [employer] gives you secure access to your electronic health record. If you see a primary care provider, you can also send messages to your care team and make appointments. If you have questions, please call your primary care clinic.  If you do not have a primary care provider, please call [phone] and they will assist you.        Care EveryWhere ID     This is your Care EveryWhere ID. This could be used by other organizations to access your [facility] medical records  [id number]       "  Your Vitals Were     Pulse Temperature Height Pulse Oximetry BMI (Body Mass Index)       71 97.5  F (36.4  C) (Oral) 5' 4.5\" (1.638 m) 99% 24.76 kg/m2        Blood Pressure from Last 3 Encounters:   07/16/18 108/60   04/14/17 122/70   03/04/16 112/68    Weight from Last 3 Encounters:   07/16/18 146 lb 8 oz (66.5 kg)   07/27/17 135 lb (61.2 kg)   04/14/17 139 lb (63 kg)              Today, you had the following     No orders found for display       Primary Care Provider Office Phone # Fax #    Niharika Nerissa Cortes -[phone]       [address] [place] [address]        Equal Access to Services     [facility] : Luis Manuel haddad Someggan, waaxda luqadaha, qaybkeyshawn kaalmada durga, cosme moreno . So [facility] [phone].    ATENCIÓN: Si habla español, tiene a ware disposición servicios gratuitos de asistencia lingüística. [facility] [phone].    We comply with applicable federal civil rights laws and [place] laws. We do not discriminate on the basis of race, color, national origin, age, disability, sex, sexual orientation, or gender identity.            Thank you!     Thank you for choosing [facility]  for your care. Our goal is always to provide you with excellent care. Hearing back from our patients is one way we can continue to improve our services. Please take a few minutes to complete the written survey that you may receive in the mail after your visit with us. Thank you!             Your Updated Medication List - Protect others around you: Learn how to safely use, store and throw away your medicines at www.disposemymeds.org.          This list is accurate as of 7/16/18  1:47 PM.  Always use your most recent med list.                   Brand Name Dispense Instructions for use Diagnosis    ALAYCEN 1/35 1-35 MG-MCG per tablet   Generic drug:  norethindrone-ethinyl estradiol     84 tablet    TAKE ONE TABLET BY MOUTH ONCE DAILY    Routine " general medical examination at a health care facility

## 2018-09-06 ENCOUNTER — OFFICE VISIT (OUTPATIENT)
Dept: FAMILY MEDICINE | Facility: CLINIC | Age: 28
End: 2018-09-06
Payer: COMMERCIAL

## 2018-09-06 VITALS
TEMPERATURE: 98.3 F | HEIGHT: 66 IN | WEIGHT: 145 LBS | SYSTOLIC BLOOD PRESSURE: 121 MMHG | HEART RATE: 64 BPM | DIASTOLIC BLOOD PRESSURE: 76 MMHG | BODY MASS INDEX: 23.3 KG/M2

## 2018-09-06 DIAGNOSIS — Z00.00 ROUTINE GENERAL MEDICAL EXAMINATION AT A HEALTH CARE FACILITY: Primary | ICD-10-CM

## 2018-09-06 DIAGNOSIS — Z12.4 SCREENING FOR MALIGNANT NEOPLASM OF CERVIX: ICD-10-CM

## 2018-09-06 DIAGNOSIS — F41.9 ANXIETY: ICD-10-CM

## 2018-09-06 DIAGNOSIS — S93.491A SPRAIN OF ANTERIOR TALOFIBULAR LIGAMENT OF RIGHT ANKLE, INITIAL ENCOUNTER: ICD-10-CM

## 2018-09-06 DIAGNOSIS — Z30.41 ENCOUNTER FOR SURVEILLANCE OF CONTRACEPTIVE PILLS: ICD-10-CM

## 2018-09-06 PROCEDURE — 99214 OFFICE O/P EST MOD 30 MIN: CPT | Mod: 25 | Performed by: FAMILY MEDICINE

## 2018-09-06 PROCEDURE — 99395 PREV VISIT EST AGE 18-39: CPT | Performed by: FAMILY MEDICINE

## 2018-09-06 PROCEDURE — G0145 SCR C/V CYTO,THINLAYER,RESCR: HCPCS | Performed by: FAMILY MEDICINE

## 2018-09-06 ASSESSMENT — ENCOUNTER SYMPTOMS
MYALGIAS: 0
DYSURIA: 0
COUGH: 0
JOINT SWELLING: 0
PALPITATIONS: 0
SORE THROAT: 0
HEMATURIA: 0
EYE PAIN: 0
NERVOUS/ANXIOUS: 1
FEVER: 0
ARTHRALGIAS: 0
HEARTBURN: 1
FREQUENCY: 0
PARESTHESIAS: 0
DIZZINESS: 0
BREAST MASS: 0
ABDOMINAL PAIN: 0
WEAKNESS: 0
HEADACHES: 0
CONSTIPATION: 0
HEMATOCHEZIA: 0
SHORTNESS OF BREATH: 0
DIARRHEA: 0
NAUSEA: 0
CHILLS: 0

## 2018-09-06 ASSESSMENT — ANXIETY QUESTIONNAIRES
5. BEING SO RESTLESS THAT IT IS HARD TO SIT STILL: SEVERAL DAYS
7. FEELING AFRAID AS IF SOMETHING AWFUL MIGHT HAPPEN: SEVERAL DAYS
3. WORRYING TOO MUCH ABOUT DIFFERENT THINGS: MORE THAN HALF THE DAYS
1. FEELING NERVOUS, ANXIOUS, OR ON EDGE: MORE THAN HALF THE DAYS
GAD7 TOTAL SCORE: 8
IF YOU CHECKED OFF ANY PROBLEMS ON THIS QUESTIONNAIRE, HOW DIFFICULT HAVE THESE PROBLEMS MADE IT FOR YOU TO DO YOUR WORK, TAKE CARE OF THINGS AT HOME, OR GET ALONG WITH OTHER PEOPLE: SOMEWHAT DIFFICULT
2. NOT BEING ABLE TO STOP OR CONTROL WORRYING: SEVERAL DAYS
6. BECOMING EASILY ANNOYED OR IRRITABLE: SEVERAL DAYS

## 2018-09-06 ASSESSMENT — PATIENT HEALTH QUESTIONNAIRE - PHQ9: 5. POOR APPETITE OR OVEREATING: NOT AT ALL

## 2018-09-06 NOTE — MR AVS SNAPSHOT
After Visit Summary   9/6/2018    Martine Lewis    MRN: [id number]           Patient Information     Date Of Birth          1990        Visit Information        Provider Department      9/6/2018 4:00 PM Niharika Cortes MD [facility]        Today's Diagnoses     Routine general medical examination at a health care facility    -  1    Screening for malignant neoplasm of cervix        Encounter for surveillance of contraceptive pills        Anxiety        Sprain of anterior talofibular ligament of right ankle, initial encounter          Care Instructions    You can decide when you start Zoloft. I would like to see you 4-6 weeks after starting this. IT can take about 4-6 weeks for the medication to take full effect. I recommend starting it on the weekend when you are at home just in case of side effects.     I usually suggest going off the birth control pill about 2-3 months before you would like to be pregnant. I recommended starting a prenatal vitamin when discontinuing birth control.   Treating Ankle Sprains  Treatment will depend on how bad your sprain is. For a severe sprain, healing may take 3 months or more.  Right after your injury: Use R.I.C.E.    Rest: At first, keep weight off the ankle as much as you can. You may be given crutches to help you walk without putting weight on the ankle.    Ice: Put an ice pack on the ankle for 20 minutes. Remove the pack and wait at least 30 minutes. Repeat for up to 3 days. This helps reduce swelling.    Compression: To reduce swelling and keep the joint stable, you may need to wrap the ankle with an elastic bandage. For more severe sprains, you may need an ankle brace, a boot, or a cast.    Elevation: To reduce swelling, keep your ankle raised above your heart when you sit or lie down.  Medicine  Your healthcare provider may suggest oral nonsteroidal anti-inflammatory medicine (NSAIDs), such as ibuprofen. This relieves the  pain and helps reduce swelling. Be sure to take your medicine as directed.  Exercises    After about 2 to 3 weeks, you may be given exercises to strengthen the ligaments and muscles in the ankle. Doing these exercises will help prevent another ankle sprain. Exercises may include standing on your toes and then on your heels and doing ankle curls.  Ankle curls    Sit on the edge of a sturdy table or lie on your back.    Pull your toes toward you. Then point them away from you. Repeat for 2 to 3 minutes.   Date Last Reviewed: 1/1/2018 2000-2017 [employer]. [address] [place] [address]. All rights reserved. This information is not intended as a substitute for professional medical care. Always follow your healthcare professional's instructions.      [facility]   Discharged by : Claritza LOPEZ CMA (AAMA)    Paper scripts provided to patient : Zoloft 50 mg     If you have any questions regarding your visit please contact your care team:     Team Gold                Clinic Hours Telephone Number     Dr. Shazia Ureña, ROLANDA Boykin, CNP 7am-7pm  Monday - Thursday   7am-5pm  Fridays  [phone]   (Appointment scheduling available 24/7)     RN Line  [phone] option 2     Urgent Care - [place] - 11am-9pm Monday-Friday Saturday-Sunday- 9am-5pm     [place] -   5pm-9pm Monday-Friday Saturday-Sunday- 9am-5pm    [phone] - [place]    [phone] - [place]       For a Price Quote for your services, please call our Consumer Price Line at [phone].     What options do I have for visits at the clinic other than the traditional office visit?     To expand how we care for you, many of our providers are utilizing electronic visits (e-visits) and telephone visits, when medically appropriate, for interactions with their patients rather than a visit in the clinic. We  also offer nurse visits for many medical concerns. Just like any other service, we will bill your insurance company for this type of visit based on time spent on the phone with your provider. Not all insurance companies cover these visits. Please check with your medical insurance if this type of visit is covered. You will be responsible for any charges that are not paid by your insurance.   E-visits via [facility]: generally incur a $35.00 fee.     Telephone visits:  Time spent on the phone: *charged based on time that is spent on the phone in increments of 10 minutes. Estimated cost:   5-10 mins $30.00   11-20 mins. $59.00   21-30 mins. $85.00       Use Dataupiat (secure email communication and access to your chart) to send your primary care provider a message or make an appointment. Ask someone on your Team how to sign up for BrightTALK.     As always, Thank you for trusting us with your health care needs!      [facility]:    Scheduling Appointments  Jesu Scruggs [facility]  Call: [phone]    [facility]  Call: [phone]    [facility]  Call: [phone]    For Gastroenterology referrals   [facility] Gastroenterology   Clinics and Surgery Center, 4th Floor   [address] [place] [address]   Appointments: [phone]    WHERE TO GO FOR CARE?  Clinic    Make an appointment if you:       Are sick (cold, cough, flu, sore throat, earache or in pain).       Have a small injury (sprain, small cut, burn or broken bone).       Need a physical exam, Pap smear, vaccine or prescription refill.       Have questions about your health or medicines.    To reach us:      Call [phone] ([phone]). Open 24 hours every day. (For counseling services, call [phone].)    Log into BrightTALK at "[email or web address][email or web address]. (Visit [email or web address] to create an account.) Hospital emergency room    An emergency is a serious or life-  threatening problem that must be treated right away.    Call 911 or get to the hospital if you have:      Very bad or sudden:            - Chest pain or pressure         - Bleeding         - Head or belly pain         - Dizziness or trouble seeing, walking or                          Speaking      Problems breathing      Blood in your vomit or you are coughing up blood      A major injury (knocked out, loss of a finger or limb, rape, broken bone protruding from skin)    A mental health crisis. (Or call the Mental Health Crisis line at 1-267.420.5014 or Suicide Prevention Hotline at 1-519.702.8900.)    Open 24 hours every day. You don't need an appointment.     Urgent care    Visit urgent care for sickness or small injuries when the clinic is closed. You don't need an appointment. To check hours or find an urgent care near you, visit [email or web address]. Online care    Get online care from [facility] for more than 70 common problems, like colds, allergies and infections. Open 24 hours every day at:   [email or web address]   Need help deciding?    For advice about where to be seen, you may call your clinic and ask to speak with a nurse. We're here for you 24 hours every day.         If you are deaf or hard of hearing, please let us know. We provide many free services including sign language interpreters, oral interpreters, TTYs, telephone amplifiers, note takers and written materials.                   Follow-ups after your visit        Additional Services     MENTAL HEALTH REFERRAL  - Adult; Outpatient Treatment; Individual/Couples/Family/Group Therapy/Health Psychology; [facility]: [facility] [phone]; We will contact you to schedule the appointment or please call with any questions       All scheduling is subject to the client's specific insurance plan & benefits, provider/location availability, and provider clinical specialities.  Please arrive 15 minutes early for your first appointment and bring your completed  "paperwork.    Please be aware that coverage of these services is subject to the terms and limitations of your health insurance plan.  Call member services at your health plan with any benefit or coverage questions.                            Follow-up notes from your care team     Return in about 6 weeks (around 10/18/2018) for Recheck mental health.      Who to contact     If you have questions or need follow up information about today's clinic visit or your schedule please contact [facility] directly at [phone].  Normal or non-critical lab and imaging results will be communicated to you by AdTonikhart, letter or phone within 4 business days after the clinic has received the results. If you do not hear from us within 7 days, please contact the clinic through [email or web address] or phone. If you have a critical or abnormal lab result, we will notify you by phone as soon as possible.  Submit refill requests through Stratasan or call your pharmacy and they will forward the refill request to us. Please allow 3 business days for your refill to be completed.          Additional Information About Your Visit        AdTonikhart Information     Stratasan gives you secure access to your electronic health record. If you see a primary care provider, you can also send messages to your care team and make appointments. If you have questions, please call your primary care clinic.  If you do not have a primary care provider, please call [phone] and they will assist you.        Care EveryWhere ID     This is your Care EveryWhere ID. This could be used by other organizations to access your [place] medical records  [id number]        Your Vitals Were     Pulse Temperature Height Last Period Breastfeeding? BMI (Body Mass Index)    64 98.3  F (36.8  C) (Oral) 5' 5.5\" (1.664 m) 08/27/2018 (Approximate) No 23.76 kg/m2       Blood Pressure from Last 3 Encounters:   09/06/18 121/76   07/16/18 108/60   04/14/17 122/70    Weight from " Last 3 Encounters:   09/06/18 145 lb (65.8 kg)   07/16/18 146 lb 8 oz (66.5 kg)   07/27/17 135 lb (61.2 kg)              We Performed the Following     MENTAL HEALTH REFERRAL  - Adult; Outpatient Treatment; Individual/Couples/Family/Group Therapy/Health Psychology; [facility]: [facility] [phone]; We will contact you to schedule the appointment or please call with any questions     Pap imaged thin layer screen reflex to HPV if ASCUS - recommend age 25 - 29          Today's Medication Changes          These changes are accurate as of 9/6/18  4:47 PM.  If you have any questions, ask your nurse or doctor.               Start taking these medicines.        Dose/Directions    sertraline 50 MG tablet   Commonly known as:  ZOLOFT   Used for:  Anxiety   Started by:  Niharika Cortes MD        Take 1/2 tablet (25 mg) for 1-2 weeks, then increase to 1 tablet orally daily   Quantity:  30 tablet   Refills:  1         These medicines have changed or have updated prescriptions.        Dose/Directions    norethindrone-ethinyl estradiol 1-35 MG-MCG per tablet   Commonly known as:  ALAYCEN 1/35   This may have changed:  See the new instructions.   Used for:  Routine general medical examination at a health care facility   Changed by:  Niharika Cortes MD        Dose:  1 tablet   Take 1 tablet by mouth daily   Quantity:  84 tablet   Refills:  3            Where to get your medicines      These medications were sent to [place] [address] [facility] [address] [facility]  [address] [facility] [address]     Phone:  [phone]     norethindrone-ethinyl estradiol 1-35 MG-MCG per tablet         Some of these will need a paper prescription and others can be bought over the counter.  Ask your nurse if you have questions.     Bring a paper prescription for each of these medications     sertraline 50 MG tablet                Primary Care Provider Office Phone # Fax #    Niharika Multani  MD Sophia [phone]       [address] [facility] [address]        Equal Access to Services     LUNA ORTIZ : Luis Manuel Hoffmann, wamitzikaity li, serata kaleidyda tanikarenakaity, cosme coronain hayaacolumba sagastumeyuki shelton laJennyfercoty aly. So [facility] [phone].    ATENCIÓN: Si habla español, tiene a ware disposición servicios gratuitos de asistencia lingüística. Llame al [phone].    We comply with applicable federal civil rights laws and [place] laws. We do not discriminate on the basis of race, color, national origin, age, disability, sex, sexual orientation, or gender identity.            Thank you!     Thank you for choosing [facility]  for your care. Our goal is always to provide you with excellent care. Hearing back from our patients is one way we can continue to improve our services. Please take a few minutes to complete the written survey that you may receive in the mail after your visit with us. Thank you!             Your Updated Medication List - Protect others around you: Learn how to safely use, store and throw away your medicines at www.disposemymeds.org.          This list is accurate as of 9/6/18  4:47 PM.  Always use your most recent med list.                   Brand Name Dispense Instructions for use Diagnosis    norethindrone-ethinyl estradiol 1-35 MG-MCG per tablet    ALAYCEN 1/35    84 tablet    Take 1 tablet by mouth daily    Routine general medical examination at a health care facility       sertraline 50 MG tablet    ZOLOFT    30 tablet    Take 1/2 tablet (25 mg) for 1-2 weeks, then increase to 1 tablet orally daily    Anxiety

## 2018-09-06 NOTE — PATIENT INSTRUCTIONS
You can decide when you start Zoloft. I would like to see you 4-6 weeks after starting this. IT can take about 4-6 weeks for the medication to take full effect. I recommend starting it on the weekend when you are at home just in case of side effects.     I usually suggest going off the birth control pill about 2-3 months before you would like to be pregnant. I recommended starting a prenatal vitamin when discontinuing birth control.   Treating Ankle Sprains  Treatment will depend on how bad your sprain is. For a severe sprain, healing may take 3 months or more.  Right after your injury: Use R.I.C.E.    Rest: At first, keep weight off the ankle as much as you can. You may be given crutches to help you walk without putting weight on the ankle.    Ice: Put an ice pack on the ankle for 20 minutes. Remove the pack and wait at least 30 minutes. Repeat for up to 3 days. This helps reduce swelling.    Compression: To reduce swelling and keep the joint stable, you may need to wrap the ankle with an elastic bandage. For more severe sprains, you may need an ankle brace, a boot, or a cast.    Elevation: To reduce swelling, keep your ankle raised above your heart when you sit or lie down.  Medicine  Your healthcare provider may suggest oral nonsteroidal anti-inflammatory medicine (NSAIDs), such as ibuprofen. This relieves the pain and helps reduce swelling. Be sure to take your medicine as directed.  Exercises    After about 2 to 3 weeks, you may be given exercises to strengthen the ligaments and muscles in the ankle. Doing these exercises will help prevent another ankle sprain. Exercises may include standing on your toes and then on your heels and doing ankle curls.  Ankle curls    Sit on the edge of a sturdy table or lie on your back.    Pull your toes toward you. Then point them away from you. Repeat for 2 to 3 minutes.   Date Last Reviewed: 1/1/2018 2000-2017 [employer]. [address], [place]  [address]. All rights reserved. This information is not intended as a substitute for professional medical care. Always follow your healthcare professional's instructions.      [facility] [place]   Discharged by : Claritza LOPEZ CMA ([facility])    Paper scripts provided to patient : Zoloft 50 mg     If you have any questions regarding your visit please contact your care team:     Team Gold                Clinic Hours Telephone Number     Dr. Shazia Ureña, CNP  Bonnie Boykin, CNP 7am-7pm  Monday - Thursday   7am-5pm  Fridays  [phone]   (Appointment scheduling available 24/7)     RN Line  [phone] option 2     Urgent Care - [place] and [place] - 11am-9pm Monday-Friday Saturday-Sunday- 9am-5pm     [place] -   5pm-9pm Monday-Friday Saturday-Sunday- 9am-5pm    [phone] - [place]    [phone] - [place]       For a Price Quote for your services, please call our Consumer Price Line at [phone].     What options do I have for visits at the clinic other than the traditional office visit?     To expand how we care for you, many of our providers are utilizing electronic visits (e-visits) and telephone visits, when medically appropriate, for interactions with their patients rather than a visit in the clinic. We also offer nurse visits for many medical concerns. Just like any other service, we will bill your insurance company for this type of visit based on time spent on the phone with your provider. Not all insurance companies cover these visits. Please check with your medical insurance if this type of visit is covered. You will be responsible for any charges that are not paid by your insurance.   E-visits via [employer]: generally incur a $35.00 fee.     Telephone visits:  Time spent on the phone: *charged based on time that is spent on the phone in increments of 10 minutes. Estimated cost:   5-10 mins $30.00   11-20  mins. $59.00   21-30 mins. $85.00       Use GoGuide (secure email communication and access to your chart) to send your primary care provider a message or make an appointment. Ask someone on your Team how to sign up for GoGuide.     As always, Thank you for trusting us with your health care needs!      [facility]:    Scheduling Appointments  [facility]u Scruggs [facility]  Call: [phone]    [facility]  Call: [phone]    [facility]  Call: [phone]    For Gastroenterology referrals   [facility] Gastroenterology   Clinics and Surgery Center, 4th Floor   [address] [place] [address]   Appointments: [phone]    WHERE TO GO FOR CARE?  Clinic    Make an appointment if you:       Are sick (cold, cough, flu, sore throat, earache or in pain).       Have a small injury (sprain, small cut, burn or broken bone).       Need a physical exam, Pap smear, vaccine or prescription refill.       Have questions about your health or medicines.    To reach us:      Call [phone] ([phone]). Open 24 hours every day. (For counseling services, call [phone].)    Log into GoGuide at [email or web address]. (Visit [email or web address] to create an account.) Hospital emergency room    An emergency is a serious or life- threatening problem that must be treated right away.    Call 810 or get to the hospital if you have:      Very bad or sudden:            - Chest pain or pressure         - Bleeding         - Head or belly pain         - Dizziness or trouble seeing, walking or                          Speaking      Problems breathing      Blood in your vomit or you are coughing up blood      A major injury (knocked out, loss of a finger or limb, rape, broken bone protruding from skin)    A mental health crisis. (Or call the Mental Health Crisis line at 1-567.342.2112 or Suicide Prevention Hotline at 1-567.261.3810.)    Open 24 hours  every day. You don't need an appointment.     Urgent care    Visit urgent care for sickness or small injuries when the clinic is closed. You don't need an appointment. To check hours or find an urgent care near you, visit [email or web address]. Online care    Get [facility] care from [facility] for more than 70 common problems, like colds, allergies and infections. Open 24 hours every day at:   [email or web address]   Need help deciding?    For advice about where to be seen, you may call your clinic and ask to speak with a nurse. We're here for you 24 hours every day.         If you are deaf or hard of hearing, please let us know. We provide many free services including sign language interpreters, oral interpreters, TTYs, telephone amplifiers, note takers and written materials.

## 2018-09-06 NOTE — LETTER
September 13, 2018      Martine Lewis  [address] [place] [id number]    Dear ,      I am happy to inform you that your recent cervical cancer screening test (PAP smear) was normal.      Preventative screenings such as this help to ensure your health for years to come. You should repeat a pap smear in 3 years, unless otherwise directed.      You will still need to return to the clinic every year for your annual exam and other preventive tests.     Please contact the clinic at [phone] if you have further questions.       Sincerely,      Niharika Cortes MD/lisette

## 2018-09-07 ASSESSMENT — ANXIETY QUESTIONNAIRES: GAD7 TOTAL SCORE: 8

## 2018-09-12 LAB
COPATH REPORT: NORMAL
PAP: NORMAL

## 2018-09-21 ENCOUNTER — TRANSFERRED RECORDS (OUTPATIENT)
Dept: HEALTH INFORMATION MANAGEMENT | Facility: CLINIC | Age: 28
End: 2018-09-21

## 2018-10-24 ENCOUNTER — VIRTUAL VISIT (OUTPATIENT)
Dept: FAMILY MEDICINE | Facility: CLINIC | Age: 28
End: 2018-10-24
Payer: COMMERCIAL

## 2018-10-24 DIAGNOSIS — F41.9 ANXIETY: ICD-10-CM

## 2018-10-24 PROCEDURE — 99441 ZZC PHYSICIAN TELEPHONE EVALUATION 5-10 MIN: CPT | Performed by: FAMILY MEDICINE

## 2018-10-24 ASSESSMENT — PATIENT HEALTH QUESTIONNAIRE - PHQ9
SUM OF ALL RESPONSES TO PHQ QUESTIONS 1-9: 3
5. POOR APPETITE OR OVEREATING: NOT AT ALL

## 2018-10-24 ASSESSMENT — ANXIETY QUESTIONNAIRES
2. NOT BEING ABLE TO STOP OR CONTROL WORRYING: SEVERAL DAYS
GAD7 TOTAL SCORE: 3
5. BEING SO RESTLESS THAT IT IS HARD TO SIT STILL: NOT AT ALL
IF YOU CHECKED OFF ANY PROBLEMS ON THIS QUESTIONNAIRE, HOW DIFFICULT HAVE THESE PROBLEMS MADE IT FOR YOU TO DO YOUR WORK, TAKE CARE OF THINGS AT HOME, OR GET ALONG WITH OTHER PEOPLE: SOMEWHAT DIFFICULT
6. BECOMING EASILY ANNOYED OR IRRITABLE: NOT AT ALL
1. FEELING NERVOUS, ANXIOUS, OR ON EDGE: SEVERAL DAYS
3. WORRYING TOO MUCH ABOUT DIFFERENT THINGS: SEVERAL DAYS
7. FEELING AFRAID AS IF SOMETHING AWFUL MIGHT HAPPEN: NOT AT ALL

## 2018-10-24 NOTE — MR AVS SNAPSHOT
After Visit Summary   10/24/2018    Martine Lewis    MRN: [id number]           Patient Information     Date Of Birth          1990        Visit Information        Provider Department      10/24/2018 12:20 PM Niharika Cortes MD [facility]        Today's Diagnoses     Anxiety           Follow-ups after your visit        Follow-up notes from your care team     Return in about 2 months (around 12/24/2018) for Mood/Mental Health Visit.      Who to contact     If you have questions or need follow up information about today's clinic visit or your schedule please contact [facility] directly at [phone].  Normal or non-critical lab and imaging results will be communicated to you by MyChart, letter or phone within 4 business days after the clinic has received the results. If you do not hear from us within 7 days, please contact the clinic through [employer] or phone. If you have a critical or abnormal lab result, we will notify you by phone as soon as possible.  Submit refill requests through [employer] or call your pharmacy and they will forward the refill request to us. Please allow 3 business days for your refill to be completed.          Additional Information About Your Visit        MyChart Information     [employer] gives you secure access to your electronic health record. If you see a primary care provider, you can also send messages to your care team and make appointments. If you have questions, please call your primary care clinic.  If you do not have a primary care provider, please call [phone] and they will assist you.        Care EveryWhere ID     This is your Care EveryWhere ID. This could be used by other organizations to access your [place] medical records  [id number]         Blood Pressure from Last 3 Encounters:   09/06/18 121/76   07/16/18 108/60   04/14/17 122/70    Weight from Last 3 Encounters:   09/06/18 145 lb (65.8 kg)   07/16/18  146 lb 8 oz (66.5 kg)   07/27/17 135 lb (61.2 kg)              Today, you had the following     No orders found for display         Today's Medication Changes          These changes are accurate as of 10/24/18  1:19 PM.  If you have any questions, ask your nurse or doctor.               These medicines have changed or have updated prescriptions.        Dose/Directions    sertraline 50 MG tablet   Commonly known as:  ZOLOFT   This may have changed:    - how much to take  - how to take this  - when to take this  - additional instructions   Used for:  Anxiety   Changed by:  Niharika Cortes MD        Dose:  50 mg   Take 1 tablet (50 mg) by mouth daily   Quantity:  30 tablet   Refills:  1            Where to get your medicines      These medications were sent to [place] [address] [place] [facility] [address]  [address], [facility] [address]     Phone:  [phone]     sertraline 50 MG tablet                Primary Care Provider Office Phone # Fax #    Niharika Cortes [phone]       [address] [place] [id number]        Equal Access to Services     [facility] AH: Hadii alecia ku hadasho Soomaali, waaxda luqadaha, qaybta kaalmada adeegyada, cosme moreno . So [facility] [phone].    ATENCIÓN: Si habla español, tiene a ware disposición servicios gratuitos de asistencia lingüística. Llame al [phone].    We comply with applicable federal civil rights laws and [place] laws. We do not discriminate on the basis of race, color, national origin, age, disability, sex, sexual orientation, or gender identity.            Thank you!     Thank you for choosing [facility]  for your care. Our goal is always to provide you with excellent care. Hearing back from our patients is one way we can continue to improve our services. Please take a few minutes to complete the written survey that you may receive in the mail after  your visit with us. Thank you!             Your Updated Medication List - Protect others around you: Learn how to safely use, store and throw away your medicines at www.disposemymeds.org.          This list is accurate as of 10/24/18  1:19 PM.  Always use your most recent med list.                   Brand Name Dispense Instructions for use Diagnosis    norethindrone-ethinyl estradiol 1-35 MG-MCG per tablet    ALAYCEN 1/35    84 tablet    Take 1 tablet by mouth daily    Routine general medical examination at a health care facility       sertraline 50 MG tablet    ZOLOFT    30 tablet    Take 1 tablet (50 mg) by mouth daily    Anxiety

## 2018-10-24 NOTE — PROGRESS NOTES
"Martine Lewis is a 28 year old female who is being evaluated via a telephone visit.      The patient has been notified of following:     \"This telephone visit will be conducted via a call between you and your physician/provider. We have found that certain health care needs can be provided without the need for a physical exam.  This service lets us provide the care you need with a short phone conversation.  If a prescription is necessary we can send it directly to your pharmacy.  If lab work is needed we can place an order for that and you can then stop by our lab to have the test done at a later time.    We will bill your insurance company for this service.  Please check with your medical insurance if this type of visit is covered. You may be responsible for the cost of this type of visit if insurance coverage is denied.  The typical cost is $30 (10min), $59(11-20min) and $85 (21-30min).  Most often these visits are shorter than 10 minutes.    If during the course of the call the physician/provider feels a telephone visit is not appropriate, you will not be charged for this service. \"     Consent has been obtained for this service by 1 care team member:yes. See the scanned image in the medical record.    Preferred patient phone number to be used for this call: [phone]     Martine Lewis complains of anxiety follow-up    Past Medical History:   Diagnosis Date     Other acne       Social History     Social History     Marital status: Single     Spouse name: N/A     Number of children: N/A     Years of education: N/A     Occupational History     Not on file.     Social History Main Topics     Smoking status: Never Smoker     Smokeless tobacco: Never Used     Alcohol use Yes      Comment: 3 drinks per week     Drug use: No     Sexual activity: Yes     Partners: Male     Birth control/ protection: Pill, Condom     Other Topics Concern     Not on file     Social History Narrative     ALLERGIES  Sulfa " drugs        Claritza Gurrola CMA ([facility])   (MA signature)    Attempted 12:52 PM with no answer  Attempted 12:56 PM with no answer    Start 1:12 PM  Stop 1:17 PM      Additional provider notes:  Started zoloft 4 weeks ago.  Took 1/2 tablet for first 6 days. Noticed some heart racing and anxiety with this dosage.  And with first dose of 50 mg also had similar symptoms.  But those symptoms have gone away.    Still gets anxious about parking and driving.  And does feel that she has been cleaning a little bit more.  Hasn't noticed significant improvement yet, but side effects are nearly gone    Assessment/Plan:  (F41.9) Anxiety  Comment:   Plan: sertraline (ZOLOFT) 50 MG tablet        Encouraged continuation on the medication.  Follow up in 8 weeks with a phone visit - as by that point would expect to note improvement symptoms with medication        Total time spent on this phone visit with the patient = 5 minutes     I have reviewed the note as documented above.  This accurately captures the substance of my conversation with the patient,  Dr. Shazia Stewart

## 2018-10-25 ENCOUNTER — TELEPHONE (OUTPATIENT)
Dept: FAMILY MEDICINE | Facility: CLINIC | Age: 28
End: 2018-10-25

## 2018-10-25 ASSESSMENT — ANXIETY QUESTIONNAIRES: GAD7 TOTAL SCORE: 3

## 2018-10-25 NOTE — TELEPHONE ENCOUNTER
Called patient and left a VM message that I was calling to help her schedule a follow up phone visit in 8 weeks w/ Dr oCrtes.    Joanna Arroyo

## 2018-10-30 NOTE — TELEPHONE ENCOUNTER
Called patient and left a VM message to call clinic and ask to speak to Joanna . - need to schedule a phone visit.    Joanna Arroyo

## 2018-11-02 NOTE — TELEPHONE ENCOUNTER
Called patient and left a VM message to call me back to schedule her phone visit.    Joanna Arroyo

## 2018-12-19 ENCOUNTER — VIRTUAL VISIT (OUTPATIENT)
Dept: FAMILY MEDICINE | Facility: CLINIC | Age: 28
End: 2018-12-19
Payer: COMMERCIAL

## 2018-12-19 DIAGNOSIS — F41.9 ANXIETY: ICD-10-CM

## 2018-12-19 PROCEDURE — 99441 ZZC PHYSICIAN TELEPHONE EVALUATION 5-10 MIN: CPT | Performed by: FAMILY MEDICINE

## 2018-12-19 ASSESSMENT — PATIENT HEALTH QUESTIONNAIRE - PHQ9: 5. POOR APPETITE OR OVEREATING: SEVERAL DAYS

## 2018-12-19 ASSESSMENT — ANXIETY QUESTIONNAIRES
2. NOT BEING ABLE TO STOP OR CONTROL WORRYING: SEVERAL DAYS
IF YOU CHECKED OFF ANY PROBLEMS ON THIS QUESTIONNAIRE, HOW DIFFICULT HAVE THESE PROBLEMS MADE IT FOR YOU TO DO YOUR WORK, TAKE CARE OF THINGS AT HOME, OR GET ALONG WITH OTHER PEOPLE: VERY DIFFICULT
1. FEELING NERVOUS, ANXIOUS, OR ON EDGE: SEVERAL DAYS
7. FEELING AFRAID AS IF SOMETHING AWFUL MIGHT HAPPEN: NOT AT ALL
5. BEING SO RESTLESS THAT IT IS HARD TO SIT STILL: NOT AT ALL
6. BECOMING EASILY ANNOYED OR IRRITABLE: NOT AT ALL
GAD7 TOTAL SCORE: 5
3. WORRYING TOO MUCH ABOUT DIFFERENT THINGS: MORE THAN HALF THE DAYS

## 2018-12-19 NOTE — PROGRESS NOTES
"Martine Lewis is a 28 year old female who is being evaluated via a telephone visit.      The patient has been notified of following (by M.A)      \"We have found that certain health care needs can be provided without the need for a physical exam.  This service lets us provide the care you need with a short phone conversation.  If a prescription is necessary we can send it directly to your pharmacy.  If lab work is needed we can place an order for that and you can then stop by our lab to have the test done at a later time.    This telephone visit will be conducted via 3 way call with the you (the patient) , the physician/provider, and a me all on the line at the same time.  This allows your physician/provider to have the phone conversation with you while I will be taking notes for your medical record.  We will have full access to your [place] medical record during this entire phone call.    Since this is like an office visit,  will bill your insurance company for this service.  Please check with your medical insurance if this type of telephone/virtual is covered . You may be responsible for the cost of this service if insurance coverage is denied.  The typical cost is $30 (10min), $59(11-20min) and $85 (21-30min)     If during the course of the call the physician/provider feels a telephone visit is not appropriate, you will not be charged for this service\"    Consent has been obtained for this service by care team member: yes.  See the scanned image in the medical record.      Getting new insurance in January - so hasn't set up counseling yet, but is still interested and willing.   side effects of medication have resolved.  But still has stressful moments.  Had a very busy week two weeks ago - and during that time felt like there were things she was ignoring in order to manage her other stress. But overall also felt that she handled that stressful week better than she had in the past.      Finds that she has a " harder time getting up in the morning.   But isn't sure if that is medication or daylight related.    STill finds driving and parking stressful  Although she feels she has more energy to prep herself for this.    Start 3:51 PM  Stop 3:57 PM        Assessment/Plan:  (F41.9) Anxiety  Comment: improved on zoloft  Plan: continue current dose.  Strongly encourage to meet with therapist.  Follow up with me in 3-4 months.

## 2018-12-19 NOTE — Clinical Note
Advised patient to follow up with me in 3-4 months.Please reach out to patient to schedule - and determine if she would prefer phone or in person visit.

## 2018-12-20 ENCOUNTER — TELEPHONE (OUTPATIENT)
Dept: FAMILY MEDICINE | Facility: CLINIC | Age: 28
End: 2018-12-20

## 2018-12-20 ASSESSMENT — ANXIETY QUESTIONNAIRES: GAD7 TOTAL SCORE: 5

## 2019-04-23 ENCOUNTER — PRENATAL OFFICE VISIT (OUTPATIENT)
Dept: NURSING | Facility: CLINIC | Age: 29
End: 2019-04-23
Payer: COMMERCIAL

## 2019-04-23 VITALS — WEIGHT: 146 LBS | BODY MASS INDEX: 24.32 KG/M2 | HEIGHT: 65 IN

## 2019-04-23 DIAGNOSIS — Z34.01 ENCOUNTER FOR SUPERVISION OF NORMAL FIRST PREGNANCY IN FIRST TRIMESTER: Primary | ICD-10-CM

## 2019-04-23 LAB
ABO + RH BLD: NORMAL
ABO + RH BLD: NORMAL
BLD GP AB SCN SERPL QL: NORMAL
BLOOD BANK CMNT PATIENT-IMP: NORMAL
ERYTHROCYTE [DISTWIDTH] IN BLOOD BY AUTOMATED COUNT: 11.6 % (ref 10–15)
HCT VFR BLD AUTO: 39.2 % (ref 35–47)
HGB BLD-MCNC: 13.6 G/DL (ref 11.7–15.7)
MCH RBC QN AUTO: 31.5 PG (ref 26.5–33)
MCHC RBC AUTO-ENTMCNC: 34.7 G/DL (ref 31.5–36.5)
MCV RBC AUTO: 91 FL (ref 78–100)
PLATELET # BLD AUTO: 250 10E9/L (ref 150–450)
RBC # BLD AUTO: 4.32 10E12/L (ref 3.8–5.2)
SPECIMEN EXP DATE BLD: NORMAL
WBC # BLD AUTO: 7.9 10E9/L (ref 4–11)

## 2019-04-23 PROCEDURE — 86762 RUBELLA ANTIBODY: CPT | Performed by: OBSTETRICS & GYNECOLOGY

## 2019-04-23 PROCEDURE — 86901 BLOOD TYPING SEROLOGIC RH(D): CPT | Performed by: OBSTETRICS & GYNECOLOGY

## 2019-04-23 PROCEDURE — 85027 COMPLETE CBC AUTOMATED: CPT | Performed by: OBSTETRICS & GYNECOLOGY

## 2019-04-23 PROCEDURE — 87591 N.GONORRHOEAE DNA AMP PROB: CPT | Performed by: OBSTETRICS & GYNECOLOGY

## 2019-04-23 PROCEDURE — 99207 ZZC PRENATAL VISIT: CPT

## 2019-04-23 PROCEDURE — 86900 BLOOD TYPING SEROLOGIC ABO: CPT | Performed by: OBSTETRICS & GYNECOLOGY

## 2019-04-23 PROCEDURE — 36415 COLL VENOUS BLD VENIPUNCTURE: CPT | Performed by: OBSTETRICS & GYNECOLOGY

## 2019-04-23 PROCEDURE — 87389 HIV-1 AG W/HIV-1&-2 AB AG IA: CPT | Performed by: OBSTETRICS & GYNECOLOGY

## 2019-04-23 PROCEDURE — 87340 HEPATITIS B SURFACE AG IA: CPT | Performed by: OBSTETRICS & GYNECOLOGY

## 2019-04-23 PROCEDURE — 86850 RBC ANTIBODY SCREEN: CPT | Performed by: OBSTETRICS & GYNECOLOGY

## 2019-04-23 PROCEDURE — 87491 CHLMYD TRACH DNA AMP PROBE: CPT | Performed by: OBSTETRICS & GYNECOLOGY

## 2019-04-23 PROCEDURE — 0064U ANTB TP TOTAL&RPR IA QUAL: CPT | Performed by: OBSTETRICS & GYNECOLOGY

## 2019-04-23 PROCEDURE — 87086 URINE CULTURE/COLONY COUNT: CPT | Performed by: OBSTETRICS & GYNECOLOGY

## 2019-04-23 RX ORDER — PRENATAL VIT/IRON FUM/FOLIC AC 27MG-0.8MG
1 TABLET ORAL DAILY
Qty: 100 TABLET | Refills: 3
Start: 2019-04-23 | End: 2020-12-10

## 2019-04-23 SDOH — ECONOMIC STABILITY: INCOME INSECURITY: HOW HARD IS IT FOR YOU TO PAY FOR THE VERY BASICS LIKE FOOD, HOUSING, MEDICAL CARE, AND HEATING?: NOT HARD AT ALL

## 2019-04-23 SDOH — ECONOMIC STABILITY: TRANSPORTATION INSECURITY
IN THE PAST 12 MONTHS, HAS THE LACK OF TRANSPORTATION KEPT YOU FROM MEDICAL APPOINTMENTS OR FROM GETTING MEDICATIONS?: NO

## 2019-04-23 SDOH — ECONOMIC STABILITY: FOOD INSECURITY: WITHIN THE PAST 12 MONTHS, YOU WORRIED THAT YOUR FOOD WOULD RUN OUT BEFORE YOU GOT MONEY TO BUY MORE.: NEVER TRUE

## 2019-04-23 SDOH — ECONOMIC STABILITY: TRANSPORTATION INSECURITY
IN THE PAST 12 MONTHS, HAS LACK OF TRANSPORTATION KEPT YOU FROM MEETINGS, WORK, OR FROM GETTING THINGS NEEDED FOR DAILY LIVING?: NO

## 2019-04-23 SDOH — ECONOMIC STABILITY: FOOD INSECURITY: WITHIN THE PAST 12 MONTHS, THE FOOD YOU BOUGHT JUST DIDN'T LAST AND YOU DIDN'T HAVE MONEY TO GET MORE.: NEVER TRUE

## 2019-04-23 ASSESSMENT — MIFFLIN-ST. JEOR: SCORE: 1393.13

## 2019-04-24 LAB
C TRACH DNA SPEC QL NAA+PROBE: NEGATIVE
HBV SURFACE AG SERPL QL IA: NONREACTIVE
HIV 1+2 AB+HIV1 P24 AG SERPL QL IA: NONREACTIVE
N GONORRHOEA DNA SPEC QL NAA+PROBE: NEGATIVE
SPECIMEN SOURCE: NORMAL
SPECIMEN SOURCE: NORMAL

## 2019-04-24 NOTE — PROGRESS NOTES
"Chief Complaint   Patient presents with     Prenatal Care     New Prenatal Nurse Visit   9w0d      Initial BP (P) 106/50 (BP Location: Right arm, Cuff Size: Adult Regular)   Pulse (P) 68   Ht 1.651 m (5' 5\")   Wt 66.2 kg (146 lb)   LMP 02/20/2019 (Exact Date)   BMI 24.30 kg/m   Estimated body mass index is 24.3 kg/m  as calculated from the following:    Height as of this encounter: 1.651 m (5' 5\").    Weight as of this encounter: 66.2 kg (146 lb).  BP completed using cuff size: regular    Questioned patient about current smoking habits.  Pt. has never smoked.      Patient is accompanied by . Prenatal book and folder (containing standard educational hand-outs and brochures) given to patient. Information in folder reviewed. Questions answered.     Discussed and gave written information on options available for 1st and 2nd trimester screening, CVS, amniocentesis, AFP, and QUAD screen plus optimal time-frame for testing. Brochure given on optional screening available to determine Cystic Fibrosis carrier status. Pt instructed to check with insurance regarding coverage of these optional tests. Pt advised to call back if she desires testing or has any questions or concerns.    Prenatal labs obtained. New prenatal visit scheduled on 5/7/19 with Dr. Hawk.  Fabi Humphries RN               "

## 2019-04-25 LAB
BACTERIA SPEC CULT: NORMAL
RUBV IGG SERPL IA-ACNC: 40 IU/ML
SPECIMEN SOURCE: NORMAL
T PALLIDUM AB SER QL: NONREACTIVE

## 2019-04-30 DIAGNOSIS — Z34.01 ENCOUNTER FOR SUPERVISION OF NORMAL FIRST PREGNANCY IN FIRST TRIMESTER: ICD-10-CM

## 2019-05-08 ENCOUNTER — PRENATAL OFFICE VISIT (OUTPATIENT)
Dept: OBGYN | Facility: CLINIC | Age: 29
End: 2019-05-08
Payer: COMMERCIAL

## 2019-05-08 VITALS — SYSTOLIC BLOOD PRESSURE: 108 MMHG | DIASTOLIC BLOOD PRESSURE: 62 MMHG | WEIGHT: 145 LBS | BODY MASS INDEX: 24.13 KG/M2

## 2019-05-08 DIAGNOSIS — F41.9 ANXIETY DURING PREGNANCY, ANTEPARTUM: Primary | ICD-10-CM

## 2019-05-08 DIAGNOSIS — O99.340 ANXIETY DURING PREGNANCY, ANTEPARTUM: Primary | ICD-10-CM

## 2019-05-08 PROCEDURE — 99207 ZZC FIRST OB VISIT: CPT | Performed by: OBSTETRICS & GYNECOLOGY

## 2019-05-08 RX ORDER — LORATADINE 10 MG/1
10 TABLET ORAL DAILY
COMMUNITY

## 2019-05-08 NOTE — NURSING NOTE
"Chief Complaint   Patient presents with     Prenatal Care     11 weeks- chest acne       Initial /62 (BP Location: Right arm, Patient Position: Sitting, Cuff Size: Adult Regular)   Wt 65.8 kg (145 lb)   LMP 2019 (Exact Date)   BMI 24.13 kg/m   Estimated body mass index is 24.13 kg/m  as calculated from the following:    Height as of 19: 1.651 m (5' 5\").    Weight as of this encounter: 65.8 kg (145 lb).  BP completed using cuff size: regular    Questioned patient about current smoking habits.  Pt. has never smoked.          The following HM Due: NONE    Jesse Orr CMA                "

## 2019-05-08 NOTE — PROGRESS NOTES
This is a 28 year old female patient,   who presents for her first obstetrical visit. This pregnancy is Planned, Desired.    EDC Dec 10, 2019 by Previous US which makes her 9w1d  today.  Her cycles are regular but with longer intervals possibly.  Her last menstrual period was normal. Since her LMP, she has had no complaints.  She denies nausea, pelvic pain and vaginal bleeding. Ultrasound in the 1st trimester showed EDC inconsistent with dates by LMP.     OB History    Para Term  AB Living   1 0 0 0 0 0   SAB TAB Ectopic Multiple Live Births   0 0 0 0 0      # Outcome Date GA Lbr Gus/2nd Weight Sex Delivery Anes PTL Lv   1 Current                History of GDM: No,  PTL : No,  History of HTN in pregnancy: No,  Thrombocytopenia: No,  Shoulder dystocia: No,  Vacuum Extraction: No  PPH: No   3rd of 4th degree laceration: No.   Other complications: No      Since her last LMP she denies use of alcohol, tobacco and street drugs.    HISTORY:  Past Medical History:   Diagnosis Date     Anxiety 2018     Other acne      Past Surgical History:   Procedure Laterality Date     DENTAL SURGERY       Family History   Problem Relation Age of Onset     Neurologic Disorder Paternal Grandmother      Prostate Cancer Paternal Grandfather      Heart Disease Paternal Grandfather      C.A.D. Father 42        stents     Anxiety Disorder Father      Family History Negative No family hx of      Social History     Socioeconomic History     Marital status:      Spouse name: Galen     Number of children: 0     Years of education: None     Highest education level: Bachelor's degree (e.g., BA, AB, BS)   Occupational History     Occupation:    Social Needs     Financial resource strain: Not hard at all     Food insecurity:     Worry: Never true     Inability: Never true     Transportation needs:     Medical: No     Non-medical: No   Tobacco Use     Smoking status: Never Smoker     Smokeless tobacco:  Never Used   Substance and Sexual Activity     Alcohol use: Not Currently     Comment: 3 drinks per week     Drug use: No     Sexual activity: Yes     Partners: Male     Birth control/protection: None   Lifestyle     Physical activity:     Days per week: None     Minutes per session: None     Stress: None   Relationships     Social connections:     Talks on phone: None     Gets together: None     Attends Jew service: None     Active member of club or organization: None     Attends meetings of clubs or organizations: None     Relationship status: None     Intimate partner violence:     Fear of current or ex partner: None     Emotionally abused: None     Physically abused: None     Forced sexual activity: None   Other Topics Concern     Parent/sibling w/ CABG, MI or angioplasty before 65F 55M? Not Asked   Social History Narrative     None     Current Outpatient Medications   Medication Sig     loratadine (CLARITIN) 10 MG tablet Take 10 mg by mouth daily     Prenatal Vit-Fe Fumarate-FA (PRENATAL MULTIVITAMIN W/IRON) 27-0.8 MG tablet Take 1 tablet by mouth daily     sertraline (ZOLOFT) 50 MG tablet Take 1 tablet (50 mg) by mouth daily     No current facility-administered medications for this visit.      Allergies   Allergen Reactions     Sulfa Drugs Unknown     Rxn as child - unknown       Past medical, surgical, social and family history were reviewed and updated in EPIC.    ROS:   12 point review of systems negative other than symptoms noted below.    EXAM:  /62 (BP Location: Right arm, Patient Position: Sitting, Cuff Size: Adult Regular)   Wt 65.8 kg (145 lb)   LMP 02/20/2019 (Exact Date)   BMI 24.13 kg/m     BMI: Body mass index is 24.13 kg/m .    EXAM:  Constitutional: Appearance: Well nourished, well developed alert, in no acute distress  Chest:  Respiratory Effort:  Breathing unlabored  Cardiovascular:Heart    Auscultation:  Regular rate, normal rhythm, no murmurs  present  Gastrointestinal:  Abdominal Examination:  Abdomen nontender to palpation, tone normal without     rigidity or guarding, no masses present, umbilicus without lesions    Liver and speen:  No hepatomegaly present, liver nontender to palpation    Hernias:  No hernias present    FHTs not heard today but had U/S last week that showed FHTs.  Skin:  General Inspection:  No rashes present, no lesions present, no areas of  discoloration.  Neurologic/Psychiatric:    Mental Status:  Oriented X3       Pelvis: External genitalia, Bartholin, urethral, and [place] glands within normal limits. Urethra is without lesion and nontender to palpation. Bladder is nontender. On speculum exam, cervix is without lesion and vagina is normal without lesion or discharge. Pap smear not obtained due to was normal 9/6/18.    ASSESSMENT:      ICD-10-CM    1. Anxiety during pregnancy, antepartum O99.340     F41.9        PLAN:    Prenatal labs reviewed. She has no questions.    Discussed options for screening for and diagnosis of chromosomal anomalies, including first trimester screen, noninvasive prenatal testing/cell-free fetal DNA testing, CVS/amniocentesis, quad screen, and ultrasound or comprehensive Level II US at 18-20 weeks. She is electing ultrasound at 18-20 weeks.  Undecided about Innatal.    Reviewed early pregnancy education, diet, exercise, prenatal vitamins, intercourse. Reviewed the call schedule, labor and delivery, and the schedule of prenatal visits.    RTC 3 weeks to confirm FHTs. She is encouraged to call sooner with questions or concerns.      Bryan Hawk MD  [facility]

## 2019-05-28 ENCOUNTER — PRENATAL OFFICE VISIT (OUTPATIENT)
Dept: OBGYN | Facility: CLINIC | Age: 29
End: 2019-05-28
Payer: COMMERCIAL

## 2019-05-28 VITALS — BODY MASS INDEX: 24.46 KG/M2 | DIASTOLIC BLOOD PRESSURE: 62 MMHG | SYSTOLIC BLOOD PRESSURE: 102 MMHG | WEIGHT: 147 LBS

## 2019-05-28 DIAGNOSIS — O99.340 ANXIETY DURING PREGNANCY, ANTEPARTUM: Primary | ICD-10-CM

## 2019-05-28 DIAGNOSIS — F41.9 ANXIETY DURING PREGNANCY, ANTEPARTUM: Primary | ICD-10-CM

## 2019-05-28 PROCEDURE — 99207 ZZC PRENATAL VISIT: CPT | Performed by: OBSTETRICS & GYNECOLOGY

## 2019-05-28 NOTE — NURSING NOTE
"Chief Complaint   Patient presents with     Prenatal Care     12 weeks- acne on chest and back        Initial /62 (BP Location: Right arm, Patient Position: Sitting, Cuff Size: Adult Regular)   Wt 66.7 kg (147 lb)   LMP 2019 (Exact Date)   BMI 24.46 kg/m   Estimated body mass index is 24.46 kg/m  as calculated from the following:    Height as of 19: 1.651 m (5' 5\").    Weight as of this encounter: 66.7 kg (147 lb).  BP completed using cuff size: regular    Questioned patient about current smoking habits.  Pt. has never smoked.          The following HM Due: NONE    12w0d  Jesse Orr CMA                "

## 2019-06-25 ENCOUNTER — PRENATAL OFFICE VISIT (OUTPATIENT)
Dept: OBGYN | Facility: CLINIC | Age: 29
End: 2019-06-25
Payer: COMMERCIAL

## 2019-06-25 VITALS — WEIGHT: 150 LBS | SYSTOLIC BLOOD PRESSURE: 118 MMHG | DIASTOLIC BLOOD PRESSURE: 60 MMHG | BODY MASS INDEX: 24.96 KG/M2

## 2019-06-25 DIAGNOSIS — F41.9 ANXIETY DURING PREGNANCY, ANTEPARTUM: Primary | ICD-10-CM

## 2019-06-25 DIAGNOSIS — O99.340 ANXIETY DURING PREGNANCY, ANTEPARTUM: Primary | ICD-10-CM

## 2019-06-25 DIAGNOSIS — Z33.1 PREGNANCY, INCIDENTAL: ICD-10-CM

## 2019-06-25 PROCEDURE — 99207 ZZC COMPLICATED OB VISIT: CPT | Performed by: OBSTETRICS & GYNECOLOGY

## 2019-06-25 NOTE — NURSING NOTE
"Chief Complaint   Patient presents with     Prenatal Care     16 weeks       Initial /60   Wt 68 kg (150 lb)   LMP 2019 (Exact Date)   BMI 24.96 kg/m   Estimated body mass index is 24.96 kg/m  as calculated from the following:    Height as of 19: 1.651 m (5' 5\").    Weight as of this encounter: 68 kg (150 lb).  BP completed using cuff size: regular    Questioned patient about current smoking habits.  Pt. has never smoked.          The following HM Due: NONE  Annie Mendez CMA    "

## 2019-06-25 NOTE — PROGRESS NOTES
No c/o's.  Declined Innatal, Quad screen, etc.  U/S at 20 weeks for anatomy and growth.  RTC 4 weeks.    Encounter Diagnosis   Name Primary?     Anxiety during pregnancy, antepartum Yes     Above risk factors stable on current Zoloft Rx.  She is doing well on it.    Bryan Hawk MD

## 2019-06-27 DIAGNOSIS — F41.9 ANXIETY: ICD-10-CM

## 2019-06-27 NOTE — TELEPHONE ENCOUNTER
"Requested Prescriptions   Pending Prescriptions Disp Refills     sertraline (ZOLOFT) 50 MG tablet [Pharmacy Med Name: SERTRALINE HCL 50 MG TABLET]  Last Written Prescription Date:  12/19/2018  Last Fill Quantity: 90 tablet,  # refills: 1   Last office visit: 9/6/2018 with prescribing provider:  KENNETH Cortes   Future Office Visit:   Next 5 appointments (look out 90 days)    Jul 30, 2019  3:45 PM CDT  [facility] [address] [place] [address]  [address]  [facility] [address]  [phone]          90 tablet 1     Sig: TAKE 1 TABLET BY MOUTH EVERY DAY       SSRIs Protocol Failed - 6/27/2019 10:38 AM  PHQ-9 SCORE 10/24/2018   PHQ-9 Total Score 3     MICHEAL-7 SCORE 9/6/2018 10/24/2018 12/19/2018   Total Score 8 3 5             Failed - No active pregnancy on record        Passed - Recent (12 mo) or future (30 days) visit within the authorizing provider's specialty     Patient had office visit in the last 12 months or has a visit in the next 30 days with authorizing provider or within the authorizing provider's specialty.  See \"Patient Info\" tab in inbasket, or \"Choose Columns\" in Meds & Orders section of the refill encounter.              Passed - Medication is active on med list        Passed - Patient is age 18 or older        Passed - No positive pregnancy test in last 12 months          "

## 2019-07-18 ENCOUNTER — OFFICE VISIT (OUTPATIENT)
Dept: FAMILY MEDICINE | Facility: CLINIC | Age: 29
End: 2019-07-18
Payer: COMMERCIAL

## 2019-07-18 VITALS
TEMPERATURE: 98.6 F | DIASTOLIC BLOOD PRESSURE: 70 MMHG | HEIGHT: 65 IN | BODY MASS INDEX: 25.66 KG/M2 | HEART RATE: 76 BPM | WEIGHT: 154 LBS | SYSTOLIC BLOOD PRESSURE: 123 MMHG

## 2019-07-18 DIAGNOSIS — F41.9 ANXIETY DURING PREGNANCY, ANTEPARTUM: Primary | ICD-10-CM

## 2019-07-18 DIAGNOSIS — O99.340 ANXIETY DURING PREGNANCY, ANTEPARTUM: Primary | ICD-10-CM

## 2019-07-18 PROCEDURE — 99213 OFFICE O/P EST LOW 20 MIN: CPT | Performed by: FAMILY MEDICINE

## 2019-07-18 ASSESSMENT — PATIENT HEALTH QUESTIONNAIRE - PHQ9
SUM OF ALL RESPONSES TO PHQ QUESTIONS 1-9: 7
5. POOR APPETITE OR OVEREATING: SEVERAL DAYS

## 2019-07-18 ASSESSMENT — ANXIETY QUESTIONNAIRES
3. WORRYING TOO MUCH ABOUT DIFFERENT THINGS: SEVERAL DAYS
6. BECOMING EASILY ANNOYED OR IRRITABLE: SEVERAL DAYS
GAD7 TOTAL SCORE: 8
2. NOT BEING ABLE TO STOP OR CONTROL WORRYING: SEVERAL DAYS
IF YOU CHECKED OFF ANY PROBLEMS ON THIS QUESTIONNAIRE, HOW DIFFICULT HAVE THESE PROBLEMS MADE IT FOR YOU TO DO YOUR WORK, TAKE CARE OF THINGS AT HOME, OR GET ALONG WITH OTHER PEOPLE: SOMEWHAT DIFFICULT
1. FEELING NERVOUS, ANXIOUS, OR ON EDGE: SEVERAL DAYS
7. FEELING AFRAID AS IF SOMETHING AWFUL MIGHT HAPPEN: SEVERAL DAYS
5. BEING SO RESTLESS THAT IT IS HARD TO SIT STILL: MORE THAN HALF THE DAYS

## 2019-07-18 ASSESSMENT — MIFFLIN-ST. JEOR: SCORE: 1424.42

## 2019-07-18 NOTE — PROGRESS NOTES
Subjective     Martien Lewis is a 29 year old female who presents to clinic today for the following health issues:    HPI   Anxiety Follow-Up    How are you doing with your anxiety since your last visit? Improved     Are you having other symptoms that might be associated with anxiety? Yes:  pregnant and tired     Have you had a significant life event? OTHER: pegnancy      Are you feeling depressed? No    Do you have any concerns with your use of alcohol or other drugs? No    Patient is in her first trimester. She reports that she has been feeling even more tired with the pregnancy, but is excited. She is sleeping more frequently, but not through the night. Patient is not sure if this could be related to her anxiety. She would like to take 12 weeks off from work, and cut down to working 4 days per week when the baby comes.     She is also working with a therapist at an [facility] location, which seems to be helping her with her mood. On occasion her anxiety will flare up. Overall, she feels that medication has greatly helped anxiety and desires to continue on it.     Social History     Tobacco Use     Smoking status: Never Smoker     Smokeless tobacco: Never Used   Substance Use Topics     Alcohol use: Not Currently     Comment: 3 drinks per week     Drug use: No     MICHEAL-7 SCORE 10/24/2018 12/19/2018 7/18/2019   Total Score 3 5 8     PHQ 10/24/2018 7/18/2019   PHQ-9 Total Score 3 7   Q9: Thoughts of better off dead/self-harm past 2 weeks Not at all Not at all     No flowsheet data found.  No flowsheet data found.      Amount of exercise or physical activity: 2-3 days/week for an average of 45-60 minutes    Problems taking medications regularly: No    Medication side effects: none    Diet: regular (no restrictions)        Reviewed and updated as needed this visit by Provider  This document serves as a record of the services and decisions personally performed by Niharika Cortes MD. It was created on her  "behalf by Debby Ledesma, a trained medical scribe. The creation of this document is based on the provider's statements to the medical scribe. Debby Ledesma July 18, 2019 2:41 PM    Allergies  Meds  Problems         Review of Systems   ROS COMP: Constitutional, HEENT, cardiovascular, pulmonary, gi and gu systems are negative, except as otherwise noted.      Objective    /70   Pulse 76   Temp 98.6  F (37  C) (Oral)   Ht 1.651 m (5' 5\")   Wt 69.9 kg (154 lb)   LMP 02/20/2019 (Exact Date)   BMI 25.63 kg/m    Body mass index is 25.63 kg/m .  Physical Exam   GENERAL: healthy, alert and no distress  PSYCH: mentation appears normal, affect normal/bright    Diagnostic Test Results:  Labs reviewed in Epic        Assessment & Plan     (O99.340,  F41.9) Anxiety during pregnancy, antepartum  (primary encounter diagnosis)  Comment: Well managed with medication. Patient in touch with her therapist.  Plan: Will continue to monitor  Advised recheck in last 1-2 months of pregnancy to determine best course of postpartum therapy     BMI:   Estimated body mass index is 25.63 kg/m  as calculated from the following:    Height as of this encounter: 1.651 m (5' 5\").    Weight as of this encounter: 69.9 kg (154 lb).       There are no Patient Instructions on file for this visit.    Return in about 3 months (around 10/18/2019) for Mood/Mental Health Visit.    Niharika Stewart MD  [facility]    "

## 2019-07-19 ASSESSMENT — ANXIETY QUESTIONNAIRES: GAD7 TOTAL SCORE: 8

## 2019-07-23 ENCOUNTER — ANCILLARY PROCEDURE (OUTPATIENT)
Dept: ULTRASOUND IMAGING | Facility: CLINIC | Age: 29
End: 2019-07-23
Attending: OBSTETRICS & GYNECOLOGY
Payer: COMMERCIAL

## 2019-07-23 DIAGNOSIS — Z33.1 PREGNANCY, INCIDENTAL: ICD-10-CM

## 2019-07-23 PROCEDURE — 76805 OB US >/= 14 WKS SNGL FETUS: CPT | Performed by: OBSTETRICS & GYNECOLOGY

## 2019-07-30 ENCOUNTER — PRENATAL OFFICE VISIT (OUTPATIENT)
Dept: OBGYN | Facility: CLINIC | Age: 29
End: 2019-07-30
Payer: COMMERCIAL

## 2019-07-30 VITALS
WEIGHT: 157 LBS | BODY MASS INDEX: 26.16 KG/M2 | SYSTOLIC BLOOD PRESSURE: 104 MMHG | HEIGHT: 65 IN | DIASTOLIC BLOOD PRESSURE: 58 MMHG

## 2019-07-30 DIAGNOSIS — F41.9 ANXIETY DURING PREGNANCY, ANTEPARTUM: Primary | ICD-10-CM

## 2019-07-30 DIAGNOSIS — O99.340 ANXIETY DURING PREGNANCY, ANTEPARTUM: Primary | ICD-10-CM

## 2019-07-30 PROCEDURE — 99207 ZZC COMPLICATED OB VISIT: CPT | Performed by: OBSTETRICS & GYNECOLOGY

## 2019-07-30 ASSESSMENT — MIFFLIN-ST. JEOR: SCORE: 1438.03

## 2019-07-30 NOTE — NURSING NOTE
"Chief Complaint   Patient presents with     Prenatal Care       Initial /58   Ht 1.651 m (5' 5\")   Wt 71.2 kg (157 lb)   LMP 2019 (Exact Date)   BMI 26.13 kg/m   Estimated body mass index is 26.13 kg/m  as calculated from the following:    Height as of this encounter: 1.651 m (5' 5\").    Weight as of this encounter: 71.2 kg (157 lb).  BP completed using cuff size: regular    Questioned patient about current smoking habits.  Pt. has never smoked.          The following HM Due: NONE    Jaycob Shelley MA               "

## 2019-07-30 NOTE — PROGRESS NOTES
No c/o's.  U/S WNL.   RTC 4 weeks.    Encounter Diagnosis   Name Primary?     Anxiety during pregnancy, antepartum Yes       Risk factors listed above are stable.    Bryan Hawk MD  [facility]

## 2019-08-27 ENCOUNTER — PRENATAL OFFICE VISIT (OUTPATIENT)
Dept: OBGYN | Facility: CLINIC | Age: 29
End: 2019-08-27
Payer: COMMERCIAL

## 2019-08-27 VITALS — BODY MASS INDEX: 26.63 KG/M2 | DIASTOLIC BLOOD PRESSURE: 62 MMHG | SYSTOLIC BLOOD PRESSURE: 102 MMHG | WEIGHT: 160 LBS

## 2019-08-27 DIAGNOSIS — O99.340 ANXIETY DURING PREGNANCY, ANTEPARTUM: Primary | ICD-10-CM

## 2019-08-27 DIAGNOSIS — F41.9 ANXIETY DURING PREGNANCY, ANTEPARTUM: Primary | ICD-10-CM

## 2019-08-27 PROCEDURE — 99207 ZZC PRENATAL VISIT: CPT | Performed by: OBSTETRICS & GYNECOLOGY

## 2019-08-27 NOTE — NURSING NOTE
"Chief Complaint   Patient presents with     Prenatal Care     25 weeks- no concerns        Initial /62 (BP Location: Right arm, Patient Position: Sitting, Cuff Size: Adult Regular)   Wt 72.6 kg (160 lb)   LMP 2019 (Exact Date)   BMI 26.63 kg/m   Estimated body mass index is 26.63 kg/m  as calculated from the following:    Height as of 19: 1.651 m (5' 5\").    Weight as of this encounter: 72.6 kg (160 lb).  BP completed using cuff size: regular    Questioned patient about current smoking habits.  Pt. has never smoked.          The following HM Due: NONE    25w0d  Jesse Orr CMA                "

## 2019-08-27 NOTE — PROGRESS NOTES
No c/o's.  On Zoloft without problems.  28 week labs, TDaP at next visit.   labor/Premature rupture of membranes precautions reviewed.  RTC 3 weeks.      Encounter Diagnosis   Name Primary?     Anxiety during pregnancy, antepartum Yes       Risk factors listed above are stable and being addressed as noted.    Bryan Hawk MD  [facility]

## 2019-09-17 ENCOUNTER — PRENATAL OFFICE VISIT (OUTPATIENT)
Dept: OBGYN | Facility: CLINIC | Age: 29
End: 2019-09-17
Payer: COMMERCIAL

## 2019-09-17 VITALS — SYSTOLIC BLOOD PRESSURE: 112 MMHG | BODY MASS INDEX: 26.79 KG/M2 | WEIGHT: 161 LBS | DIASTOLIC BLOOD PRESSURE: 62 MMHG

## 2019-09-17 DIAGNOSIS — O99.340 ANXIETY DURING PREGNANCY, ANTEPARTUM: Primary | ICD-10-CM

## 2019-09-17 DIAGNOSIS — F41.9 ANXIETY DURING PREGNANCY, ANTEPARTUM: Primary | ICD-10-CM

## 2019-09-17 DIAGNOSIS — Z23 NEED FOR PROPHYLACTIC VACCINATION AND INOCULATION AGAINST INFLUENZA: ICD-10-CM

## 2019-09-17 LAB
ERYTHROCYTE [DISTWIDTH] IN BLOOD BY AUTOMATED COUNT: 11.9 % (ref 10–15)
HCT VFR BLD AUTO: 35.6 % (ref 35–47)
HGB BLD-MCNC: 12.1 G/DL (ref 11.7–15.7)
MCH RBC QN AUTO: 31.6 PG (ref 26.5–33)
MCHC RBC AUTO-ENTMCNC: 34 G/DL (ref 31.5–36.5)
MCV RBC AUTO: 93 FL (ref 78–100)
PLATELET # BLD AUTO: 210 10E9/L (ref 150–450)
RBC # BLD AUTO: 3.83 10E12/L (ref 3.8–5.2)
WBC # BLD AUTO: 11.8 10E9/L (ref 4–11)

## 2019-09-17 PROCEDURE — 90472 IMMUNIZATION ADMIN EACH ADD: CPT | Performed by: OBSTETRICS & GYNECOLOGY

## 2019-09-17 PROCEDURE — 82950 GLUCOSE TEST: CPT | Performed by: OBSTETRICS & GYNECOLOGY

## 2019-09-17 PROCEDURE — 90715 TDAP VACCINE 7 YRS/> IM: CPT | Performed by: OBSTETRICS & GYNECOLOGY

## 2019-09-17 PROCEDURE — 85027 COMPLETE CBC AUTOMATED: CPT | Performed by: OBSTETRICS & GYNECOLOGY

## 2019-09-17 PROCEDURE — 36415 COLL VENOUS BLD VENIPUNCTURE: CPT | Performed by: OBSTETRICS & GYNECOLOGY

## 2019-09-17 PROCEDURE — 90686 IIV4 VACC NO PRSV 0.5 ML IM: CPT | Performed by: OBSTETRICS & GYNECOLOGY

## 2019-09-17 PROCEDURE — 86780 TREPONEMA PALLIDUM: CPT | Performed by: OBSTETRICS & GYNECOLOGY

## 2019-09-17 PROCEDURE — 90471 IMMUNIZATION ADMIN: CPT | Performed by: OBSTETRICS & GYNECOLOGY

## 2019-09-17 PROCEDURE — 99207 ZZC COMPLICATED OB VISIT: CPT | Performed by: OBSTETRICS & GYNECOLOGY

## 2019-09-17 RX ORDER — BREAST PUMP
1 EACH MISCELLANEOUS DAILY PRN
Qty: 1 EACH | Refills: 0 | Status: SHIPPED | OUTPATIENT
Start: 2019-09-17 | End: 2020-12-10

## 2019-09-17 NOTE — NURSING NOTE
"Chief Complaint   Patient presents with     Prenatal Care     28 weeks- no concerns        Initial /62 (BP Location: Right arm, Patient Position: Sitting, Cuff Size: Adult Regular)   Wt 73 kg (161 lb)   LMP 2019 (Exact Date)   BMI 26.79 kg/m   Estimated body mass index is 26.79 kg/m  as calculated from the following:    Height as of 19: 1.651 m (5' 5\").    Weight as of this encounter: 73 kg (161 lb).  BP completed using cuff size: regular    Questioned patient about current smoking habits.  Pt. has never smoked.            28w0d  Jesse Orr CMA                "

## 2019-09-17 NOTE — PROGRESS NOTES
No c/o's.  28 week labs, TDaP, Flu shot today.  Stable on Zoloft for anxiety.   labor/Premature rupture of membranes precautions reviewed.  RTC 4 weeks.    Encounter Diagnosis   Name Primary?     Anxiety during pregnancy, antepartum Yes       Risk factors listed above are stable and being addressed as noted.    Bryan Hawk MD  [facility]

## 2019-09-18 DIAGNOSIS — R73.09 ABNORMAL GLUCOSE TOLERANCE TEST: Primary | ICD-10-CM

## 2019-09-18 LAB — GLUCOSE 1H P 50 G GLC PO SERPL-MCNC: 140 MG/DL (ref 60–129)

## 2019-09-19 LAB — T PALLIDUM AB SER QL: NONREACTIVE

## 2019-09-24 DIAGNOSIS — R73.09 ABNORMAL GLUCOSE TOLERANCE TEST: ICD-10-CM

## 2019-09-24 LAB
GLUCOSE 1H P 100 G GLC PO SERPL-MCNC: 142 MG/DL (ref 60–179)
GLUCOSE 2H P 100 G GLC PO SERPL-MCNC: NORMAL MG/DL (ref 60–154)
GLUCOSE 3H P 100 G GLC PO SERPL-MCNC: NORMAL MG/DL (ref 60–139)
GLUCOSE P FAST SERPL-MCNC: 72 MG/DL (ref 60–94)

## 2019-09-24 PROCEDURE — 82952 GTT-ADDED SAMPLES: CPT | Performed by: OBSTETRICS & GYNECOLOGY

## 2019-09-24 PROCEDURE — 36415 COLL VENOUS BLD VENIPUNCTURE: CPT | Performed by: OBSTETRICS & GYNECOLOGY

## 2019-09-24 PROCEDURE — 82951 GLUCOSE TOLERANCE TEST (GTT): CPT | Performed by: OBSTETRICS & GYNECOLOGY

## 2019-10-10 ENCOUNTER — OFFICE VISIT (OUTPATIENT)
Dept: FAMILY MEDICINE | Facility: CLINIC | Age: 29
End: 2019-10-10
Payer: COMMERCIAL

## 2019-10-10 VITALS
BODY MASS INDEX: 27.32 KG/M2 | SYSTOLIC BLOOD PRESSURE: 120 MMHG | HEIGHT: 65 IN | HEART RATE: 67 BPM | WEIGHT: 164 LBS | DIASTOLIC BLOOD PRESSURE: 78 MMHG | TEMPERATURE: 97.6 F

## 2019-10-10 DIAGNOSIS — F41.9 ANXIETY: ICD-10-CM

## 2019-10-10 DIAGNOSIS — H65.191 ACUTE EFFUSION OF RIGHT EAR: Primary | ICD-10-CM

## 2019-10-10 PROCEDURE — 99213 OFFICE O/P EST LOW 20 MIN: CPT | Performed by: FAMILY MEDICINE

## 2019-10-10 ASSESSMENT — ANXIETY QUESTIONNAIRES
1. FEELING NERVOUS, ANXIOUS, OR ON EDGE: MORE THAN HALF THE DAYS
3. WORRYING TOO MUCH ABOUT DIFFERENT THINGS: NEARLY EVERY DAY
2. NOT BEING ABLE TO STOP OR CONTROL WORRYING: MORE THAN HALF THE DAYS
6. BECOMING EASILY ANNOYED OR IRRITABLE: SEVERAL DAYS
IF YOU CHECKED OFF ANY PROBLEMS ON THIS QUESTIONNAIRE, HOW DIFFICULT HAVE THESE PROBLEMS MADE IT FOR YOU TO DO YOUR WORK, TAKE CARE OF THINGS AT HOME, OR GET ALONG WITH OTHER PEOPLE: SOMEWHAT DIFFICULT
GAD7 TOTAL SCORE: 11
7. FEELING AFRAID AS IF SOMETHING AWFUL MIGHT HAPPEN: SEVERAL DAYS
5. BEING SO RESTLESS THAT IT IS HARD TO SIT STILL: SEVERAL DAYS

## 2019-10-10 ASSESSMENT — PATIENT HEALTH QUESTIONNAIRE - PHQ9
SUM OF ALL RESPONSES TO PHQ QUESTIONS 1-9: 11
5. POOR APPETITE OR OVEREATING: SEVERAL DAYS

## 2019-10-10 ASSESSMENT — MIFFLIN-ST. JEOR: SCORE: 1469.78

## 2019-10-10 NOTE — PATIENT INSTRUCTIONS
Discontinue ZOLOFT 50 mg, and start new dose of 75 mg daily.   Let's plan to see each other after you have had the baby.    And certainly let me know sooner if your symptoms do not improve with the increased dose.    Consider Afrin nasal spray to improve symptoms.

## 2019-10-10 NOTE — PROGRESS NOTES
Subjective     Martine Lewis is a 29 year old female who presents to clinic today for the following health issues:    HPI   Anxiety Follow-Up  Symptoms worsening. Patient notes that her mood is sensitive season change. She also reports that planning her baby shower registry has been stressful. Patient does however have support in her life. Her mom and best friend have been helping her out through pregnancy.     How are you doing with your anxiety since your last visit? Worsened for the past 1.5 months     Are you having other symptoms that might be associated with anxiety? Yes:  .worries a lot, irrational fears     Have you had a significant life event? OTHER: pregnancy      Are you feeling depressed? No    Do you have any concerns with your use of alcohol or other drugs? No    Right Ear   Patient notes some clogging in her right ear. She suspects symptoms could be related to pregnancy.     Social History     Tobacco Use     Smoking status: Never Smoker     Smokeless tobacco: Never Used   Substance Use Topics     Alcohol use: Not Currently     Comment: 3 drinks per week     Drug use: No     MICHEAL-7 SCORE 10/24/2018 12/19/2018 7/18/2019   Total Score 3 5 8     PHQ 10/24/2018 7/18/2019   PHQ-9 Total Score 3 7   Q9: Thoughts of better off dead/self-harm past 2 weeks Not at all Not at all         How many servings of fruits and vegetables do you eat daily?  2-3    On average, how many sweetened beverages do you drink each day (soda, juice, sweet tea, etc)?   0    How many days per week do you miss taking your medication? 0    Patient Active Problem List   Diagnosis     Other acne     CARDIOVASCULAR SCREENING; LDL GOAL LESS THAN 160     Anxiety     Anxiety during pregnancy, antepartum     Past Surgical History:   Procedure Laterality Date     DENTAL SURGERY       EYE SURGERY  12/2018    Lasik       Social History     Tobacco Use     Smoking status: Never Smoker     Smokeless tobacco: Never Used   Substance Use Topics      "Alcohol use: Not Currently     Comment: 3 drinks per week     Family History   Problem Relation Age of Onset     Neurologic Disorder Paternal Grandmother      Prostate Cancer Paternal Grandfather         I don't remember this, but if I told you before, I believe me     Heart Disease Paternal Grandfather      C.A.D. Father 42        stents     Anxiety Disorder Father      Hyperlipidemia Father      Family History Negative No family hx of          Current Outpatient Medications   Medication Sig Dispense Refill     loratadine (CLARITIN) 10 MG tablet Take 10 mg by mouth daily       Misc. Devices (BREAST PUMP) MISC 1 each daily as needed 1 each 0     Prenatal Vit-Fe Fumarate-FA (PRENATAL MULTIVITAMIN W/IRON) 27-0.8 MG tablet Take 1 tablet by mouth daily 100 tablet 3     sertraline (ZOLOFT) 50 MG tablet Take 1.5 tablets (75 mg) by mouth daily 135 tablet 1     BP Readings from Last 3 Encounters:   10/10/19 120/78   09/17/19 112/62   08/27/19 102/62    Wt Readings from Last 3 Encounters:   10/10/19 74.4 kg (164 lb)   09/17/19 73 kg (161 lb)   08/27/19 72.6 kg (160 lb)           Reviewed and updated as needed this visit by Provider         Review of Systems   ROS COMP: Constitutional, HEENT, cardiovascular, pulmonary, gi and gu systems are negative, except as otherwise noted.    This document serves as a record of the services and decisions personally performed by Niharika Cortes MD. It was created on her behalf by Debby Ledesma, a trained medical scribe. The creation of this document is based on the provider's statements to the medical scribe. Debby Ledesma October 10, 2019 3:50 PM        Objective    /78   Pulse 67   Temp 97.6  F (36.4  C) (Oral)   Ht 1.651 m (5' 5\")   Wt 74.4 kg (164 lb)   LMP 02/20/2019 (Exact Date)   BMI 27.29 kg/m    Body mass index is 27.29 kg/m .  Physical Exam   GENERAL: healthy, alert and no distress  HENT: normal cephalic/atraumatic, right ear: clear effusion, nose and mouth " "without ulcers or lesions, oropharynx clear and oral mucous membranes moist  PSYCH: mentation appears normal, affect normal/bright    Diagnostic Test Results:  Labs reviewed in Epic        Assessment & Plan     (H65.191) Acute effusion of right ear  (primary encounter diagnosis)  Comment: Physical exam showed clear effusion in right ear   Plan: Patient recommended to try afrin nasal spray to improve symptoms.    (F41.9) Anxiety  Comment: worsening as pregnancy contiues  Plan: sertraline (ZOLOFT) 50 MG tablet        Due to worsening anxiety, I increased sertraline to 75 mg daily. Patient will monitor symptoms at home and follow up as needed. She will follow up post delivery.  Can continue this medication postpartum while breastfeeding.        BMI:   Estimated body mass index is 27.29 kg/m  as calculated from the following:    Height as of this encounter: 1.651 m (5' 5\").    Weight as of this encounter: 74.4 kg (164 lb).        See Patient Instructions    Return in about 10 months (around 8/10/2020) for Mood/Mental Health Visit.    The information in this document, created by the medical scribe for me, accurately reflects the services I personally performed and the decisions made by me. I have reviewed and approved this document for accuracy.     Niharika Stewart MD  [facility]    "

## 2019-10-11 ASSESSMENT — ANXIETY QUESTIONNAIRES: GAD7 TOTAL SCORE: 11

## 2019-10-15 ENCOUNTER — PRENATAL OFFICE VISIT (OUTPATIENT)
Dept: OBGYN | Facility: CLINIC | Age: 29
End: 2019-10-15
Payer: COMMERCIAL

## 2019-10-15 VITALS — WEIGHT: 165 LBS | BODY MASS INDEX: 27.46 KG/M2 | DIASTOLIC BLOOD PRESSURE: 64 MMHG | SYSTOLIC BLOOD PRESSURE: 112 MMHG

## 2019-10-15 DIAGNOSIS — O99.340 ANXIETY DURING PREGNANCY, ANTEPARTUM: Primary | ICD-10-CM

## 2019-10-15 DIAGNOSIS — F41.9 ANXIETY DURING PREGNANCY, ANTEPARTUM: Primary | ICD-10-CM

## 2019-10-15 DIAGNOSIS — O36.60X0 EXCESSIVE FETAL GROWTH AFFECTING MANAGEMENT OF PREGNANCY, ANTEPARTUM, SINGLE OR UNSPECIFIED FETUS: ICD-10-CM

## 2019-10-15 PROCEDURE — 99207 ZZC COMPLICATED OB VISIT: CPT | Performed by: OBSTETRICS & GYNECOLOGY

## 2019-10-15 NOTE — PROGRESS NOTES
No OB c/o's.  Has some right ear congestion.  Her PCP told her she has fluid in her ear, no infection.  I recommend she takes some OTC Sudafed prn as this is safe in pregnancy.  Wasn't able to tolerate 3hr GTT but did have normal FBS and 1hr post-glucose.  Will check U/S for EFW, JUSTIN at 36 weeks.  Fetal movement counts BID,  labor/premature rupture of membranes precautions reviewed.  RTC 2 week(s).    Encounter Diagnoses   Name Primary?     Anxiety during pregnancy, antepartum Yes     Excessive fetal growth affecting management of pregnancy, antepartum, single or unspecified fetus        Risk factors listed above are stable and being addressed as noted.    Bryan Hawk MD  [facility]

## 2019-10-15 NOTE — NURSING NOTE
"Chief Complaint   Patient presents with     Prenatal Care     32 weeks- right sided ear fluid        Initial /64 (BP Location: Right arm, Patient Position: Sitting, Cuff Size: Adult Regular)   Wt 74.8 kg (165 lb)   LMP 2019 (Exact Date)   BMI 27.46 kg/m   Estimated body mass index is 27.46 kg/m  as calculated from the following:    Height as of 10/10/19: 1.651 m (5' 5\").    Weight as of this encounter: 74.8 kg (165 lb).  BP completed using cuff size: regular    Questioned patient about current smoking habits.  Pt. has never smoked.          The following HM Due: NONE    32w0d  Jesse Orr CMA                "

## 2019-10-29 ENCOUNTER — PRENATAL OFFICE VISIT (OUTPATIENT)
Dept: OBGYN | Facility: CLINIC | Age: 29
End: 2019-10-29
Payer: COMMERCIAL

## 2019-10-29 VITALS — SYSTOLIC BLOOD PRESSURE: 104 MMHG | DIASTOLIC BLOOD PRESSURE: 60 MMHG | WEIGHT: 168 LBS | BODY MASS INDEX: 27.96 KG/M2

## 2019-10-29 DIAGNOSIS — F41.9 ANXIETY DURING PREGNANCY, ANTEPARTUM: Primary | ICD-10-CM

## 2019-10-29 DIAGNOSIS — O99.340 ANXIETY DURING PREGNANCY, ANTEPARTUM: Primary | ICD-10-CM

## 2019-10-29 PROCEDURE — 99207 ZZC COMPLICATED OB VISIT: CPT | Performed by: OBSTETRICS & GYNECOLOGY

## 2019-10-29 NOTE — NURSING NOTE
"Chief Complaint   Patient presents with     Prenatal Care     34 weeks- no concerns        Initial /60 (BP Location: Right arm, Patient Position: Sitting, Cuff Size: Adult Regular)   Wt 76.2 kg (168 lb)   LMP 2019 (Exact Date)   BMI 27.96 kg/m   Estimated body mass index is 27.96 kg/m  as calculated from the following:    Height as of 10/10/19: 1.651 m (5' 5\").    Weight as of this encounter: 76.2 kg (168 lb).  BP completed using cuff size: regular    Questioned patient about current smoking habits.  Pt. has never smoked.          The following HM Due: NONE    34w0d  Jesse Orr CMA                "

## 2019-10-29 NOTE — PROGRESS NOTES
No c/o's.  Doing well on Zoloft.  U/S in 2 weeks for EFW, JUSTIN.  GBS at next visit.  Fetal movement counts BID,  labor/premature rupture of membranes precautions reviewed.  RTC 2 week(s).    Encounter Diagnosis   Name Primary?     Anxiety during pregnancy, antepartum Yes       Risk factors listed above are stable and being addressed as noted.    Bryan Hawk MD  [facility]

## 2019-11-07 ENCOUNTER — HOSPITAL ENCOUNTER (INPATIENT)
Facility: CLINIC | Age: 29
LOS: 2 days | Discharge: HOME | End: 2019-11-09
Attending: FAMILY MEDICINE | Admitting: FAMILY MEDICINE
Payer: COMMERCIAL

## 2019-11-07 ENCOUNTER — NURSE TRIAGE (OUTPATIENT)
Dept: NURSING | Facility: CLINIC | Age: 29
End: 2019-11-07

## 2019-11-07 ENCOUNTER — HEALTH MAINTENANCE LETTER (OUTPATIENT)
Age: 29
End: 2019-11-07

## 2019-11-07 LAB
AMPHETAMINES UR QL SCN: NEGATIVE
CANNABINOIDS UR QL: NEGATIVE
COCAINE UR QL: NEGATIVE
GLUCOSE BLDC GLUCOMTR-MCNC: 72 MG/DL (ref 70–99)
OPIATES UR QL SCN: NEGATIVE
PCP UR QL SCN: NEGATIVE
RUPTURE OF FETAL MEMBRANES BY ROM PLUS: POSITIVE

## 2019-11-07 PROCEDURE — 12000000 ZZH R&B MED SURG/OB

## 2019-11-07 PROCEDURE — 00000146 ZZHCL STATISTIC GLUCOSE BY METER IP

## 2019-11-07 PROCEDURE — G0463 HOSPITAL OUTPT CLINIC VISIT: HCPCS

## 2019-11-07 PROCEDURE — 86780 TREPONEMA PALLIDUM: CPT | Performed by: FAMILY MEDICINE

## 2019-11-07 PROCEDURE — 72200001 ZZH LABOR CARE VAGINAL DELIVERY SINGLE

## 2019-11-07 PROCEDURE — 25000128 H RX IP 250 OP 636: Performed by: FAMILY MEDICINE

## 2019-11-07 PROCEDURE — 25000125 ZZHC RX 250: Performed by: FAMILY MEDICINE

## 2019-11-07 PROCEDURE — 25000132 ZZH RX MED GY IP 250 OP 250 PS 637: Performed by: FAMILY MEDICINE

## 2019-11-07 PROCEDURE — 84112 EVAL AMNIOTIC FLUID PROTEIN: CPT | Performed by: FAMILY MEDICINE

## 2019-11-07 PROCEDURE — 0KQM0ZZ REPAIR PERINEUM MUSCLE, OPEN APPROACH: ICD-10-PCS | Performed by: FAMILY MEDICINE

## 2019-11-07 PROCEDURE — 80307 DRUG TEST PRSMV CHEM ANLYZR: CPT | Performed by: FAMILY MEDICINE

## 2019-11-07 PROCEDURE — 88307 TISSUE EXAM BY PATHOLOGIST: CPT | Performed by: FAMILY MEDICINE

## 2019-11-07 PROCEDURE — 36415 COLL VENOUS BLD VENIPUNCTURE: CPT | Performed by: FAMILY MEDICINE

## 2019-11-07 PROCEDURE — 25000128 H RX IP 250 OP 636

## 2019-11-07 PROCEDURE — 59400 OBSTETRICAL CARE: CPT | Performed by: FAMILY MEDICINE

## 2019-11-07 PROCEDURE — 87653 STREP B DNA AMP PROBE: CPT | Performed by: FAMILY MEDICINE

## 2019-11-07 PROCEDURE — 88307 TISSUE EXAM BY PATHOLOGIST: CPT | Mod: 26 | Performed by: FAMILY MEDICINE

## 2019-11-07 RX ORDER — MISOPROSTOL 200 UG/1
400 TABLET ORAL
Status: DISCONTINUED | OUTPATIENT
Start: 2019-11-07 | End: 2019-11-09 | Stop reason: HOSPADM

## 2019-11-07 RX ORDER — ACETAMINOPHEN 325 MG/1
650 TABLET ORAL EVERY 4 HOURS PRN
Status: DISCONTINUED | OUTPATIENT
Start: 2019-11-07 | End: 2019-11-07

## 2019-11-07 RX ORDER — NALOXONE HYDROCHLORIDE 0.4 MG/ML
.1-.4 INJECTION, SOLUTION INTRAMUSCULAR; INTRAVENOUS; SUBCUTANEOUS
Status: DISCONTINUED | OUTPATIENT
Start: 2019-11-07 | End: 2019-11-07

## 2019-11-07 RX ORDER — OXYTOCIN 10 [USP'U]/ML
10 INJECTION, SOLUTION INTRAMUSCULAR; INTRAVENOUS
Status: DISCONTINUED | OUTPATIENT
Start: 2019-11-07 | End: 2019-11-07

## 2019-11-07 RX ORDER — OXYTOCIN/0.9 % SODIUM CHLORIDE 30/500 ML
100 PLASTIC BAG, INJECTION (ML) INTRAVENOUS CONTINUOUS
Status: DISCONTINUED | OUTPATIENT
Start: 2019-11-07 | End: 2019-11-09 | Stop reason: HOSPADM

## 2019-11-07 RX ORDER — OXYTOCIN/0.9 % SODIUM CHLORIDE 30/500 ML
340 PLASTIC BAG, INJECTION (ML) INTRAVENOUS CONTINUOUS PRN
Status: DISCONTINUED | OUTPATIENT
Start: 2019-11-07 | End: 2019-11-09 | Stop reason: HOSPADM

## 2019-11-07 RX ORDER — NALOXONE HYDROCHLORIDE 0.4 MG/ML
.1-.4 INJECTION, SOLUTION INTRAMUSCULAR; INTRAVENOUS; SUBCUTANEOUS
Status: DISCONTINUED | OUTPATIENT
Start: 2019-11-07 | End: 2019-11-09 | Stop reason: HOSPADM

## 2019-11-07 RX ORDER — BISACODYL 10 MG
10 SUPPOSITORY, RECTAL RECTAL DAILY PRN
Status: DISCONTINUED | OUTPATIENT
Start: 2019-11-09 | End: 2019-11-09 | Stop reason: HOSPADM

## 2019-11-07 RX ORDER — CARBOPROST TROMETHAMINE 250 UG/ML
250 INJECTION, SOLUTION INTRAMUSCULAR
Status: DISCONTINUED | OUTPATIENT
Start: 2019-11-07 | End: 2019-11-08

## 2019-11-07 RX ORDER — AMOXICILLIN 250 MG
2 CAPSULE ORAL 2 TIMES DAILY
Status: DISCONTINUED | OUTPATIENT
Start: 2019-11-07 | End: 2019-11-09 | Stop reason: HOSPADM

## 2019-11-07 RX ORDER — PENICILLIN G POTASSIUM 5000000 [IU]/1
5 INJECTION, POWDER, FOR SOLUTION INTRAMUSCULAR; INTRAVENOUS ONCE
Status: COMPLETED | OUTPATIENT
Start: 2019-11-07 | End: 2019-11-07

## 2019-11-07 RX ORDER — AMOXICILLIN 250 MG
1 CAPSULE ORAL 2 TIMES DAILY
Status: DISCONTINUED | OUTPATIENT
Start: 2019-11-07 | End: 2019-11-09 | Stop reason: HOSPADM

## 2019-11-07 RX ORDER — HYDROCORTISONE 2.5 %
CREAM (GRAM) TOPICAL 3 TIMES DAILY PRN
Status: DISCONTINUED | OUTPATIENT
Start: 2019-11-07 | End: 2019-11-09 | Stop reason: HOSPADM

## 2019-11-07 RX ORDER — IBUPROFEN 800 MG/1
800 TABLET, FILM COATED ORAL
Status: DISCONTINUED | OUTPATIENT
Start: 2019-11-07 | End: 2019-11-07

## 2019-11-07 RX ORDER — METHYLERGONOVINE MALEATE 0.2 MG/ML
200 INJECTION INTRAVENOUS
Status: DISCONTINUED | OUTPATIENT
Start: 2019-11-07 | End: 2019-11-07

## 2019-11-07 RX ORDER — OXYTOCIN/0.9 % SODIUM CHLORIDE 30/500 ML
100-340 PLASTIC BAG, INJECTION (ML) INTRAVENOUS CONTINUOUS PRN
Status: COMPLETED | OUTPATIENT
Start: 2019-11-07 | End: 2019-11-07

## 2019-11-07 RX ORDER — METHYLERGONOVINE MALEATE 0.2 MG/ML
200 INJECTION INTRAVENOUS ONCE
Status: DISCONTINUED | OUTPATIENT
Start: 2019-11-07 | End: 2019-11-09 | Stop reason: HOSPADM

## 2019-11-07 RX ORDER — FENTANYL CITRATE 50 UG/ML
50-100 INJECTION, SOLUTION INTRAMUSCULAR; INTRAVENOUS
Status: DISCONTINUED | OUTPATIENT
Start: 2019-11-07 | End: 2019-11-08

## 2019-11-07 RX ORDER — LANOLIN 100 %
OINTMENT (GRAM) TOPICAL
Status: DISCONTINUED | OUTPATIENT
Start: 2019-11-07 | End: 2019-11-09 | Stop reason: HOSPADM

## 2019-11-07 RX ORDER — METHYLERGONOVINE MALEATE 0.2 MG/ML
INJECTION INTRAVENOUS
Status: COMPLETED
Start: 2019-11-07 | End: 2019-11-07

## 2019-11-07 RX ORDER — ONDANSETRON 2 MG/ML
4 INJECTION INTRAMUSCULAR; INTRAVENOUS EVERY 6 HOURS PRN
Status: DISCONTINUED | OUTPATIENT
Start: 2019-11-07 | End: 2019-11-07

## 2019-11-07 RX ORDER — ACETAMINOPHEN 325 MG/1
650 TABLET ORAL EVERY 4 HOURS PRN
Status: DISCONTINUED | OUTPATIENT
Start: 2019-11-07 | End: 2019-11-09 | Stop reason: HOSPADM

## 2019-11-07 RX ORDER — BETAMETHASONE SODIUM PHOSPHATE AND BETAMETHASONE ACETATE 3; 3 MG/ML; MG/ML
12 INJECTION, SUSPENSION INTRA-ARTICULAR; INTRALESIONAL; INTRAMUSCULAR; SOFT TISSUE EVERY 24 HOURS
Status: DISCONTINUED | OUTPATIENT
Start: 2019-11-07 | End: 2019-11-08

## 2019-11-07 RX ORDER — OXYCODONE AND ACETAMINOPHEN 5; 325 MG/1; MG/1
1 TABLET ORAL
Status: DISCONTINUED | OUTPATIENT
Start: 2019-11-07 | End: 2019-11-07

## 2019-11-07 RX ORDER — SODIUM CHLORIDE, SODIUM LACTATE, POTASSIUM CHLORIDE, CALCIUM CHLORIDE 600; 310; 30; 20 MG/100ML; MG/100ML; MG/100ML; MG/100ML
INJECTION, SOLUTION INTRAVENOUS CONTINUOUS
Status: DISCONTINUED | OUTPATIENT
Start: 2019-11-07 | End: 2019-11-08

## 2019-11-07 RX ORDER — IBUPROFEN 800 MG/1
800 TABLET, FILM COATED ORAL EVERY 6 HOURS PRN
Status: DISCONTINUED | OUTPATIENT
Start: 2019-11-07 | End: 2019-11-09 | Stop reason: HOSPADM

## 2019-11-07 RX ORDER — ONDANSETRON 2 MG/ML
4 INJECTION INTRAMUSCULAR; INTRAVENOUS EVERY 6 HOURS PRN
Status: DISCONTINUED | OUTPATIENT
Start: 2019-11-07 | End: 2019-11-08

## 2019-11-07 RX ORDER — OXYTOCIN 10 [USP'U]/ML
10 INJECTION, SOLUTION INTRAMUSCULAR; INTRAVENOUS
Status: DISCONTINUED | OUTPATIENT
Start: 2019-11-07 | End: 2019-11-09 | Stop reason: HOSPADM

## 2019-11-07 RX ADMIN — PENICILLIN G POTASSIUM 5 MILLION UNITS: 5000000 POWDER, FOR SOLUTION INTRAMUSCULAR; INTRAPLEURAL; INTRATHECAL; INTRAVENOUS at 20:20

## 2019-11-07 RX ADMIN — METHYLERGONOVINE MALEATE 200 MCG: 0.2 INJECTION, SOLUTION INTRAMUSCULAR; INTRAVENOUS at 22:21

## 2019-11-07 RX ADMIN — METHYLERGONOVINE MALEATE 200 MCG: 0.2 INJECTION INTRAVENOUS at 22:21

## 2019-11-07 RX ADMIN — BETAMETHASONE SODIUM PHOSPHATE AND BETAMETHASONE ACETATE 12 MG: 3; 3 INJECTION, SUSPENSION INTRA-ARTICULAR; INTRALESIONAL; INTRAMUSCULAR at 20:15

## 2019-11-07 RX ADMIN — IBUPROFEN 800 MG: 800 TABLET ORAL at 23:46

## 2019-11-07 RX ADMIN — LIDOCAINE HYDROCHLORIDE 20 ML: 10 INJECTION, SOLUTION EPIDURAL; INFILTRATION; INTRACAUDAL; PERINEURAL at 20:43

## 2019-11-07 RX ADMIN — Medication 340 ML/HR: at 20:43

## 2019-11-07 NOTE — TELEPHONE ENCOUNTER
"Martine calling reporting vaginal fluid leakage. She is 35 weeks pregnant. The fluid is \"gooey, clear, and thick\". The fluid was \"pink earlier\" but is not clear. She also reports abdominal cramping. Paged on-call provider to call the patient back.   FNA advised patient to phone back FNA in 20 minutes if no response from on call MD and patient agreed.    Magy Mckeon, RN, BSN  [facility]    Reason for Disposition    Leakage of fluid from vagina    Additional Information    Negative: [1] SEVERE abdominal pain (e.g., excruciating) AND [2] constant AND [3] present > 1 hour    Negative: Severe bleeding (e.g., continuous red blood from vagina, or large blood clots)    Negative: Umbilical cord hanging out of the vagina (shiny, white, curled appearance, \"like telephone cord\")    Negative: Uncontrollable urge to push (i.e., feels like baby is coming out now)    Negative: Can see baby    Negative: Sounds like a life-threatening emergency to the triager    Protocols used: PREGNANCY - RUPTURE OF [id number]      "

## 2019-11-08 LAB
GP B STREP DNA SPEC QL NAA+PROBE: NEGATIVE
HGB BLD-MCNC: 12.8 G/DL (ref 11.7–15.7)
SPECIMEN SOURCE: NORMAL
T PALLIDUM AB SER QL: NONREACTIVE

## 2019-11-08 PROCEDURE — 85018 HEMOGLOBIN: CPT | Performed by: FAMILY MEDICINE

## 2019-11-08 PROCEDURE — 25000132 ZZH RX MED GY IP 250 OP 250 PS 637: Performed by: OBSTETRICS & GYNECOLOGY

## 2019-11-08 PROCEDURE — 36415 COLL VENOUS BLD VENIPUNCTURE: CPT | Performed by: FAMILY MEDICINE

## 2019-11-08 PROCEDURE — 25000132 ZZH RX MED GY IP 250 OP 250 PS 637: Performed by: FAMILY MEDICINE

## 2019-11-08 PROCEDURE — 12000000 ZZH R&B MED SURG/OB

## 2019-11-08 RX ADMIN — SENNOSIDES AND DOCUSATE SODIUM 1 TABLET: 8.6; 5 TABLET ORAL at 22:24

## 2019-11-08 RX ADMIN — IBUPROFEN 800 MG: 800 TABLET ORAL at 13:51

## 2019-11-08 RX ADMIN — ACETAMINOPHEN 650 MG: 325 TABLET, FILM COATED ORAL at 08:29

## 2019-11-08 RX ADMIN — IBUPROFEN 800 MG: 800 TABLET ORAL at 05:37

## 2019-11-08 RX ADMIN — IBUPROFEN 800 MG: 800 TABLET ORAL at 22:24

## 2019-11-08 RX ADMIN — SERTRALINE HYDROCHLORIDE 75 MG: 50 TABLET ORAL at 22:24

## 2019-11-08 RX ADMIN — ACETAMINOPHEN 650 MG: 325 TABLET, FILM COATED ORAL at 17:01

## 2019-11-08 RX ADMIN — SENNOSIDES AND DOCUSATE SODIUM 1 TABLET: 8.6; 5 TABLET ORAL at 08:21

## 2019-11-08 NOTE — H&P
[facility] Labor and Delivery History and Physical    Martine Lewis MRN# [id number]   Age: 29 year old YOB: 1990     Date of Admission:  2019    Primary care provider: Niharika Cortes           Chief Complaint:   Martine Lewis is a 29 year old female who is  @ 35w2d pregnant and being admitted for  labor and SROM, GBS unknown, did not complete 3 hour glucose testing due to vomiting.            Pregnancy history:     OBSTETRIC HISTORY:    OB History    Para Term  AB Living   1 0 0 0 0 0   SAB TAB Ectopic Multiple Live Births   0 0 0 0 0      # Outcome Date GA Lbr Gus/2nd Weight Sex Delivery Anes PTL Lv   1 Current                EDC: Estimated Date of Delivery: Dec 10, 2019    Prenatal Labs:   Lab Results   Component Value Date    ABO B 2019    RH Pos 2019    AS Neg 2019    HEPBANG Nonreactive 2019    CHPCRT Negative 2019    GCPCRT Negative 2019    HGB 12.1 2019       GBS Status:   No results found for: GBS    Active Problem List  Patient Active Problem List   Diagnosis     Other acne     CARDIOVASCULAR SCREENING; LDL GOAL LESS THAN 160     Anxiety     Anxiety during pregnancy, antepartum     Indication for care in labor or delivery       Medication Prior to Admission  Medications Prior to Admission   Medication Sig Dispense Refill Last Dose     loratadine (CLARITIN) 10 MG tablet Take 10 mg by mouth daily   Taking     Misc. Devices (BREAST PUMP) MISC 1 each daily as needed (Patient not taking: Reported on 10/29/2019) 1 each 0 Not Taking     Prenatal Vit-Fe Fumarate-FA (PRENATAL MULTIVITAMIN W/IRON) 27-0.8 MG tablet Take 1 tablet by mouth daily 100 tablet 3 Taking     sertraline (ZOLOFT) 50 MG tablet Take 1.5 tablets (75 mg) by mouth daily 135 tablet 1 Taking   .        Maternal Past Medical History:     Past Medical History:   Diagnosis Date     Anxiety 2018     Other acne                        Family  History:   This patient has no significant family history            Social History:   This patient has no significant social history         Review of Systems:   CONSTITUTIONAL: NEGATIVE for fever, chills, change in weight  INTEGUMENTARY/SKIN: NEGATIVE for worrisome rashes, moles or lesions  EYES: NEGATIVE for vision changes or irritation  ENT/MOUTH: NEGATIVE for ear, mouth and throat problems  RESP: NEGATIVE for significant cough or SOB  BREAST: NEGATIVE for masses, tenderness or discharge  CV: NEGATIVE for chest pain, palpitations or peripheral edema  GI: NEGATIVE for nausea, abdominal pain, heartburn, or change in bowel habits  : NEGATIVE for frequency, dysuria, or hematuria  MUSCULOSKELETAL: NEGATIVE for significant arthralgias or myalgia  NEURO: NEGATIVE for weakness, dizziness or paresthesias  ENDOCRINE: NEGATIVE for temperature intolerance, skin/hair changes  HEME: NEGATIVE for bleeding problems  PSYCHIATRIC: NEGATIVE for changes in mood or affect          Physical Exam:     Vitals were reviewed  All vitals stable  Patient Vitals for the past 12 hrs:   BP Temp Temp src Resp   11/07/19 1900 130/75 97.9  F (36.6  C) Oral 18     Constitutional: Awake, alert, cooperative, no apparent distress, and appears stated age.  Eyes: Lids and lashes normal, pupils equal, round and reactive to light, extra ocular muscles intact, sclera clear, conjunctiva normal.  ENT: Normocephalic, without obvious abnormality, atramatic, sinuses nontender on palpation, external ears without lesions, oral pharynx with moist mucus membranes, tonsils without erythema or exudates, gums normal and good dentition.  Neck: Supple, symmetrical, trachea midline, no adenopathy, thyroid symmetric, not enlarged and no tenderness, skin normal.  Hematologic / Lymphatic: No cervical lymphadenopathy and no supraclavicular lymphadenopathy.  Back: Symmetric, no curvature, spinous processes are non-tender on palpation, paraspinous muscles are non-tender on  palpation, no costal vertebral tenderness.  Lungs: No increased work of breathing, good air exchange, clear to auscultation bilaterally, no crackles or wheezing.  Cardiovascular: Regular rate and rhythm, normal S1 and S2, no S3 or S4, and no murmur noted.  Chest / Breast: Breasts symmetrical, skin without lesion(s), no nipple retraction or dimpling, no nipple discharge, no masses palpated, no axillary or supraclavicular adenopathy.  Abdomen: No scars, normal bowel sounds, soft, non-distended, non-tender, no masses palpated, no hepatosplenomegally.  Genitourinary: No urethral discharge, normal external genitalia, no hernia.  Musculoskeletal: No redness, warmth, or swelling of the joints.  Full range of motion noted.  Motor strength is 5 out of 5 all extremities bilaterally.  Tone is normal.  Neurologic: Awake, alert, oriented to name, place and time.  Cranial nerves II-XII are grossly intact.  Motor is 5 out of 5 bilaterally.  Cerebellar finger to nose, heel to shin intact.  Sensory is intact.  Babinski down going, Romberg negative, and gait is normal.  Neuropsychiatric: Normal affect, mood, orientation, memory and insight.  Skin: No rashes, erythema, pallor, petechia or purpura.   Cervix:   Membranes: SROM   Dilation: 3   Effacement: 90%   Station:-2   Consistency: soft   Position: Mid  Presentation:Cephalic  Fetal Heart Rate Tracing: Tier 1 (normal)  Tocometer: frequency q 2-3 minutes                       Assessment:   Martine Lewis is a 29 year old female who is  @ 35w2d pregnant and being admitted for  labor and SROM, GBS unknown, did not complete 3 hour glucose testing due to vomiting.          Plan:   Admit - see IP orders  Anticipate   PCN for GBS unknown   Betamethasone for prematurity   NICU present for deliver   Elevated 1 hour test, did not complete 3 hour because of vomiting but the 1 hour from the 3 hour test was normal.  Check glucose during labor.     Shazia Oh, DO

## 2019-11-08 NOTE — PROVIDER NOTIFICATION
11/08/19 0804   Provider Notification   Provider Name/Title Dr. Padron   Method of Notification In Department   Notification Reason Medication Request   During rounds patient requested to restart 75mg Zoloft. Verbal order given to writer to enter in epic.

## 2019-11-08 NOTE — PROVIDER NOTIFICATION
11/07/19 2215   Provider Notification   Provider Name/Title Dr. Oh    Method of Notification In Department   Request Evaluate-Remote   Notification Reason Status Update     Physician informed of moderate lochia. Fundus remains firm. Orders received to administer methergine 200 mcg IM now. Verbal orders read back and verified. Will encourage pt to void and continue frequent checks prior to transferring to post partum. Concha Mann RN on 11/8/2019 at 6:04 AM

## 2019-11-08 NOTE — PLAN OF CARE
Pt doing well. Up walking in room and hallway. Spending time in NICU with baby. Pumped x1 at 1030 and knows to try and pump every 3 hours. Pt is agreeable to this plan. Tucks given to use for bernie discomfort.  present and supportive.

## 2019-11-08 NOTE — PLAN OF CARE
Pt meeting expected outcomes. Vitals stable. Fundus firm and midline. Denies difficulty voiding. Pain controlled with ibuprofen. Independent with self cares. Pumping for baby in NICU.

## 2019-11-08 NOTE — PROVIDER NOTIFICATION
11/07/19 2000   Provider Notification   Provider Name/Title gideon   Method of Notification At Bedside

## 2019-11-08 NOTE — LACTATION NOTE
This note was copied from a baby's chart.  DAVID spoke with parents in the NICU. Handouts given for pumping, storing and cleaning pumping parts.  Pumping: Martine reports pumping x2 with no results. I affirmed this was expected. I stressed importance of pumping, reviewed pumping pressure settings.   Plan: Will continue to follow and support

## 2019-11-08 NOTE — PLAN OF CARE
Data: Martine Lewis transferred to post partum rm 432 via wheelchair at 0000. VSS. Hand expression preformed. Fundus form. Bleeding light. Perineum red and swollen. Ice applied. Ibuprofen administered. Voided. Pitocin infusing. Spouse and parents at bedside.     Baby transferred to NICU via isolette with NICU team at 2145 d/t respiratory distress.    Action: Receiving unit notified of transfer: Yes. Patient and family notified of room change. Report given to MAIRA Fry, at 0000. Belongings sent to receiving unit. Accompanied by Registered Nurse. Oriented patient to surroundings. Call light within reach.    Response: Patient tolerated transfer and is stable. Tearful.     Concha Mann RN on 11/8/2019 at 6:20 AM

## 2019-11-08 NOTE — PROVIDER NOTIFICATION
MD updated on SVE and pt status. Orders to admit to inpatient, start PCN for unknown GBS and give first dose of beta. Will also take a BS as pt failed her 1 hour glucose test.

## 2019-11-08 NOTE — PLAN OF CARE
Patient has remained stable this evening. Independent in room. Family present and supportive. Pumped x1 and went to NICU with FOB via wheelchair. Pain well managed with ibuprofen and Tylenol.

## 2019-11-08 NOTE — PROVIDER NOTIFICATION
Data: Patient presented to [facility]: 2019  6:39 PM.  Reason for maternal/fetal assessment is uterine contractions, leaking vaginal fluid. Patient reports feeling leaking around 1500 with contractions that intensified around 1630.  Patient is a .  Prenatal record reviewed. Pregnancy has been uncomplicated..  Gestational Age 35w2d. VSS. Fetal movement present. Patient denies vaginal bleeding. Support person is present.   Action: Verbal consent for EFM. Triage assessment completed. Bill of rights reviewed.  Response: Patient verbalized agreement with plan. Will contact Dr Shazia Oh with update and further orders.

## 2019-11-08 NOTE — PROVIDER NOTIFICATION
11/07/19 2040   Uterine Activity Assessment   Method external tocotransducer   Contraction Frequency (Minutes) 1.5 - 2.5    Contraction Duration (seconds) 30 - 60    Fetal Assessment   Fetal HR Assessment Method external US  (Broken tracing - see note. )     Broken tracing. FHR 90's on monitor. Physician at bedside. Pt is pushing effectively. Kiwi vacuum at bedside if needed. NICU team called to attend delivery. Concha Mann RN on 11/8/2019 at 5:56 AM

## 2019-11-08 NOTE — PROGRESS NOTES
[facility] Obstetrics Post-Partum Progress Note          Assessment and Plan:    Assessment:   Post-partum day #1  Martine Lewis is a 29 year old female who is  @ 35w2d pregnant and being admitted for  labor and SROM, GBS unknown, did not complete 3 hour glucose testing due to vomiting.  L&D complications: Vaginal birth 6#6 oz male infant      Doing well.  Normal healing wound.  No excessive bleeding  Pain well-controlled.      Plan:   Ambulation encouraged  Monitor wound for signs of infection  Pain control measures as needed  Reportable signs and symptoms dicussed with the patient           Interval History:   Doing well.  Pain is well-controlled.  No fevers.  No history of foul-smelling vaginal discharge.  Good appetite.  Denies chest pain, shortness of breath, nausea or vomiting.  Vaginal bleeding is similar to a heavy menstrual flow.  Ambulatory.  Breastfeeding -- pt in NICU  Pt pumping          Significant Problems:      Past Medical History:   Diagnosis Date     Anxiety 2018     Other acne              Review of Systems:    The patient denies any chest pain, shortness of breath, excessive pain, fever, chills, purulent drainage from the wound, nausea or vomiting.          Medications:     All medications related to the patient's surgery have been reviewed  Current Facility-Administered Medications   Medication     acetaminophen (TYLENOL) tablet 650 mg     [START ON 2019] bisacodyl (DULCOLAX) Suppository 10 mg     hydrocortisone 2.5 % cream     ibuprofen (ADVIL/MOTRIN) tablet 800 mg     lactated ringers BOLUS 1,000 mL     lanolin ointment     Measles, Mumps & Rubella Vac (MMR) injection 0.5 mL     methylergonovine (METHERGINE) injection 200 mcg     misoprostol (CYTOTEC) tablet 400 mcg     naloxone (NARCAN) injection 0.1-0.4 mg     NO Rho (D) immune globulin (RhoGam) needed - mother Rh POSITIVE     oxytocin (PITOCIN) 30 units in 500 mL 0.9% NaCl infusion     oxytocin (PITOCIN) 30  units in 500 mL 0.9% NaCl infusion     oxytocin (PITOCIN) injection 10 Units     senna-docusate (SENOKOT-S/PERICOLACE) 8.6-50 MG per tablet 1 tablet    Or     senna-docusate (SENOKOT-S/PERICOLACE) 8.6-50 MG per tablet 2 tablet     sertraline (ZOLOFT) tablet 75 mg     [START ON 11/9/2019] sodium phosphate (FLEET ENEMA) 1 enema     Tdap (tetanus-diphtheria-acell pertussis) (ADACEL) injection 0.5 mL     tranexamic acid (CYKLOKAPRON) 760 mg in sodium chloride 0.9 % 50 mL bolus             Physical Exam:   Vitals were reviewed  All vitals stable  Temp: 98.3  F (36.8  C) Temp src: Oral BP: 119/69 Pulse: 71   Resp: 16 SpO2: 99 %      Uterine fundus is firm, non-tender and at the level of the umbilicus          Data:     All laboratory data related to this surgery reviewed  Hemoglobin   Date Value Ref Range Status   11/08/2019 12.8 11.7 - 15.7 g/dL Final   09/17/2019 12.1 11.7 - 15.7 g/dL Final   04/23/2019 13.6 11.7 - 15.7 g/dL Final   04/14/2017 13.8 11.7 - 15.7 g/dL Final   09/03/2006 14.9 11.7 - 15.7 g/dL Final     No imaging studies have been ordered    Andre Padron MD

## 2019-11-09 VITALS
RESPIRATION RATE: 16 BRPM | HEART RATE: 62 BPM | SYSTOLIC BLOOD PRESSURE: 118 MMHG | OXYGEN SATURATION: 99 % | TEMPERATURE: 98.8 F | DIASTOLIC BLOOD PRESSURE: 65 MMHG

## 2019-11-09 PROCEDURE — 25000132 ZZH RX MED GY IP 250 OP 250 PS 637: Performed by: FAMILY MEDICINE

## 2019-11-09 RX ADMIN — IBUPROFEN 800 MG: 800 TABLET ORAL at 06:49

## 2019-11-09 RX ADMIN — IBUPROFEN 800 MG: 800 TABLET ORAL at 12:59

## 2019-11-09 RX ADMIN — ACETAMINOPHEN 650 MG: 325 TABLET, FILM COATED ORAL at 14:26

## 2019-11-09 NOTE — DISCHARGE INSTRUCTIONS
[facility]: [phone]    Postpartum Vaginal Delivery Instructions    Activity       Ask family and friends for help when you need it.    Do not place anything in your vagina for 6 weeks.    You are not restricted on other activities, but take it easy for a few weeks to allow your body to recover from delivery.  You are able to do any activities you feel up to that point.    No driving until you have stopped taking your pain medications (usually two weeks after delivery).     Call your health care provider if you have any of these symptoms:       Increased pain, swelling, redness, or fluid around your stiches from an episiotomy or perineal tear.    A fever above 100.4 F (38 C) with or without chills when placing a thermometer under your tongue.    You soak a sanitary pad with blood within 1 hour, or you see blood clots larger than a golf ball.    Bleeding that lasts more than 6 weeks.    Vaginal discharge that smells bad.    Severe pain, cramping or tenderness in your lower belly area.    A need to urinate more frequently (use the toilet more often), more urgently (use the toilet very quickly), or it burns when you urinate.    Nausea and vomiting.    Redness, swelling or pain around a vein in your leg.    Problems breastfeeding or a red or painful area on your breast.    Chest pain and cough or are gasping for air.    Problems coping with sadness, anxiety, or depression.  If you have any concerns about hurting yourself or the baby, call your provider immediately.     You have questions or concerns after you return home.     Keep your hands clean:  Always wash your hands before touching your perineal area and stitches.  This helps reduce your risk of infection.  If your hands aren't dirty, you may use an alcohol hand-rub to clean your hands. Keep your nails clean and short.

## 2019-11-09 NOTE — PLAN OF CARE
Patient vital signs stable and meeting expected outcomes.  Pumping for infant in NICU.  Visiting baby this evening and plans to go down this morning.  Up independently and voiding adequately.   Pain well controlled with ibuprofen.  Able to perform all cares for self.  Bonding with baby in NICU.  Plan to discharge today.  Will continue to monitor.

## 2019-11-09 NOTE — PLAN OF CARE
Patient stable and discharged to bed and board at 1430. AVS reviewed and all questions answered. Patient states she has a breast pump prescription at home. Encouraged her to speak with IBCLC in NICU regarding possibility of need for hospital grade breast pump before filling her pump prescription. Bed and board reviewed with patient and . Birth certificate returned, but parents needing to name  still. They are aware of needing to complete prior to 5 days at the .

## 2019-11-09 NOTE — DISCHARGE SUMMARY
[facility] Obstetrics Post-Partum Progress Note          Assessment and Plan:    Assessment:   Post-partum day #2  Normal spontaneous vaginal delivery   labor 35w  L&D complications: Pre term labor      Doing well.  No excessive bleeding  Pain well-controlled.  Baby in [place] with indeterminate discharge status - discussed Bed and Board option      Plan:   Ambulation encouraged  Discharge later today  Follow up 6 weeks  Bed and Board option discussed           Interval History:   Doing well.  Pain is well-controlled.  No fevers.  No history of foul-smelling vaginal discharge.  Good appetite.  Denies chest pain, shortness of breath, nausea or vomiting.  Vaginal bleeding is similar to a heavy menstrual flow.  Ambulatory.  Breastfeeding.          Significant Problems:      Past Medical History:   Diagnosis Date     Anxiety 2018     Other acne                    Physical Exam:     All vitals stable  Patient Vitals for the past 24 hrs:   BP Temp Temp src Pulse Resp   19 0115 106/56 98.1  F (36.7  C) Oral 58 16   19 1651 127/67 98  F (36.7  C) Oral 75 16   19 0804 119/69 98.3  F (36.8  C) Oral 71 16     Uterine fundus is firm, non-tender and at the level of the umbilicus  Ext NT     Diego Campbell MD  2019 7:26 AM

## 2019-11-11 LAB — COPATH REPORT: NORMAL

## 2019-11-11 NOTE — L&D DELIVERY NOTE
OB Vaginal Delivery Note    Martine Lewis MRN# [id number]   Age: 29 year old YOB: 1990       GA: 35w2d  GP:   Labor Complications: None;Excessive Vaginal Bleeding   EBL:    mL  Delivery QBL: 73 mL  Delivery Type: Vaginal, Spontaneous   ROM to Delivery Time: (Delivered) Hours: 6 Minutes: 9  [place] Weight: 2.9 kg (6 lb 6.3 oz)    1 Minute 5 Minute 10 Minute   Apgar Totals: 6    6    8      GINO LARA;ALCIDES CABEZAS;MECHE DICKERSON     Delivery Details:  Martine Lewis, a 29 year old  female delivered a viable infant with apgars of 6   and 6  . She arrived@ 3 cm dilated with PROM. She was given BMT and PCN was started for GBS unknown.  Patient was fully dilated and pushing after 4  hours 30  minutes in active labor. Delivery was via vaginal, spontaneous  to a sterile field under none  anesthesia. Infant delivered in vertex  left  occiput  anterior  position. Anterior and posterior shoulders delivered without difficulty. The cord was clamped, cut twice and 3 vessels  were noted. Cord blood was obtained in routine fashion with the following disposition: lab .      Cord complications: nuchal   Placenta delivered at 2019  8:44 PM . Placental disposition was Pathology . Fundal massage performed and fundus found to be firm.     Episiotomy: none    Perineum, vagina, cervix were inspected, and the following lacerations were noted:   Perineal lacerations: 2nd                     Any lacerations were repaired in the usual fashion using 3-0 monocryl.    Excellent hemostasis was noted. Needle count correct. Infant and patient in delivery room in good and stable condition.        Labor Event Times    Labor onset date:  19 Onset time:   2:30 PM   Dilation complete date:  19 Complete time:   7:00 PM   Start pushing date/time:  2019      Labor Length    1st Stage (hrs):  4 (min):  30   2nd Stage (hrs):  1 (min):  39   3rd Stage (hrs):  0 (min):  4      Labor  Events     labor?:  Yes   steroids:  Partial Course  Labor Type:  Spontaneous  Predominate monitoring during 1st stage:  continuous electronic fetal monitoring     Antibiotics received during labor?:  Yes  Reason for Antibiotics:  GBS  Antibiotics received for GBS:  Penicillin  Antibiotics Given (GBS):  Less than or equal to 4 hours prior to delivery     Rupture date/time: 19 1430   Rupture type:  Premature Rupture of Membranes  Fluid color:  Clear     1:1 continuous labor support provided by?:  RN Labor partogram used?:  no      Delivery/Placenta Date and Time    Delivery Date:  19 Delivery Time:   8:39 PM   Placenta Date/Time:  2019  8:44 PM  Oxytocin given at the time of delivery:  after delivery of placenta, after delivery of baby     Vaginal Counts     Initial count performed by 2 team members:   Two Team Members   Dr. rajiv Turner, Rn       Needles Suture [place] Sponges Instruments   Initial counts 2  5    Added to count  2 5    Final counts 2 2 10    Placed during labor Accounted for at the end of labor   NA NA   NA NA   NA NA    Final count performed by 2 team members:   Two Team Members   Dr. gideon Turner,Rn      Final count correct?:  Yes         Apgars    Living status:  Living   1 Minute 5 Minute 10 Minute 15 Minute 20 Minute   Skin color: 0  0  1      Heart rate: 2  2  2      Reflex irritability: 2  2  2      Muscle tone: 1  1  1      Respiratory effort: 1  1  2      Total: 6  6  8      Apgars assigned by:  MAIRA MCGRATH     Cord    Vessels:  3 Vessels Complications:  Nuchal   Cord Blood Disposition:  Lab Gases Sent?:  No       Resuscitation          Care at Delivery:  Delivery Clinician:   Shazia Oh DO Requested NNP attend  [facility] at 35-2/7 weeks with PROM. Delayed cord clamping for one minute. On mothers abdomen.   Spontaneous respirations, stimulatued to cry by drying skin with blankets, suction with bulb syringe.  Infant  "doing well on mother, team dismissed,  left in room with nurse and family.  Julien Raygoza YOSSI MCDONNELLP MSN 0 PM, 2019    Called back to room at ~14 minutes of age, infant required CPAP as SaO2 was in 70's. Upon my arrival he was pink, SaO2 in upper 90's and on room air. Within 2 minutes, SaO2 dropping, placed back on CPAP of 6cm and required up to 40%oxygen to attain SaO2 in 90's, able to wean to 30%. Loaded into transporter, on CPAP, saw mother again. Father followed to NICU  Julien Raygoza YOSSI CNNP MSN 9:22 PM, 2019    Output in Delivery Room:  Voided     [place] Measurements    Weight:  6 lb 6.3 oz Length:  1' 6.11\"   Head circumference:  33 cm    Birth Comments:  Pitocin and methergine post delivery      Skin to Skin and Feeding Plan    Skin to skin initiation date/time: 1841    Skin to skin with:  Mother  Skin to skin end date/time: 1841       Labor Events and Shoulder Dystocia    Fetal Tracing Prior to Delivery:  Category 2  Fetal Tracing Comments:  low baseline, early decelerations with pushing  Shoulder dystocia present?:  Neg             Delivery (Maternal) (Provider to Complete) ([id number])    Episiotomy:  None  Perineal lacerations:  2nd Repaired?:  Yes      Blood Loss  Mother: Martine Lewis #[id number]   Start of Mother's Information    IO Blood Loss  19 1430 - 19 2039    Delivery QBL (mL) Hospital Encounter 356 mL    Total  356 mL         End of Mother's Information  Mother: Martine Lewis #[id number]         Delivery - Provider to Complete ([id number])    Delivering clinician:  Shazia Oh DO  Delivery Type (Choose the 1 that will go to the Birth History):  Vaginal, Spontaneous   Other personnel:   Provider Role   Lucie Turner RN Holland, Krista L, RN Tillemans, Kim Marie, DO          Placenta    Delayed Cord Clamping:  Done  Date/Time:  2019  8:44 PM  Removal:  Spontaneous  Comments:  send placenta  Disposition:  Pathology     Anesthesia  "   Method:  None          Presentation and Position    Presentation:  Vertex  Position:  Left Occiput Anterior           Shazia Oh DO

## 2019-11-12 ENCOUNTER — TELEPHONE (OUTPATIENT)
Dept: OBGYN | Facility: CLINIC | Age: 29
End: 2019-11-12

## 2019-11-12 NOTE — TELEPHONE ENCOUNTER
Pt lost previous breast pump rx.  Sent new one.    Claritza TORRES R.N.  [facility]

## 2019-11-15 ENCOUNTER — LACTATION ENCOUNTER (OUTPATIENT)
Age: 29
End: 2019-11-15

## 2019-12-20 ENCOUNTER — PRENATAL OFFICE VISIT (OUTPATIENT)
Dept: OBGYN | Facility: CLINIC | Age: 29
End: 2019-12-20
Payer: COMMERCIAL

## 2019-12-20 VITALS — SYSTOLIC BLOOD PRESSURE: 112 MMHG | BODY MASS INDEX: 23.96 KG/M2 | DIASTOLIC BLOOD PRESSURE: 62 MMHG | WEIGHT: 144 LBS

## 2019-12-20 DIAGNOSIS — Z30.41 ORAL CONTRACEPTIVE PILL SURVEILLANCE: ICD-10-CM

## 2019-12-20 PROBLEM — O99.340 ANXIETY DURING PREGNANCY, ANTEPARTUM: Status: RESOLVED | Noted: 2019-05-08 | Resolved: 2019-12-20

## 2019-12-20 PROBLEM — F41.9 ANXIETY DURING PREGNANCY, ANTEPARTUM: Status: RESOLVED | Noted: 2019-05-08 | Resolved: 2019-12-20

## 2019-12-20 PROCEDURE — 99207 ZZC POST PARTUM EXAM: CPT | Performed by: OBSTETRICS & GYNECOLOGY

## 2019-12-20 RX ORDER — ACETAMINOPHEN AND CODEINE PHOSPHATE 120; 12 MG/5ML; MG/5ML
0.35 SOLUTION ORAL DAILY
Qty: 84 TABLET | Refills: 3 | Status: SHIPPED | OUTPATIENT
Start: 2019-12-20 | End: 2020-12-10

## 2019-12-20 ASSESSMENT — PATIENT HEALTH QUESTIONNAIRE - PHQ9: SUM OF ALL RESPONSES TO PHQ QUESTIONS 1-9: 4

## 2019-12-20 NOTE — NURSING NOTE
"Chief Complaint   Patient presents with     Post Partum Exam      6# 6oz boy        Initial /62 (BP Location: Right arm, Patient Position: Sitting, Cuff Size: Adult Regular)   Wt 65.3 kg (144 lb)   LMP 2019 (Exact Date)   Breastfeeding Yes   BMI 23.96 kg/m   Estimated body mass index is 23.96 kg/m  as calculated from the following:    Height as of 10/10/19: 1.651 m (5' 5\").    Weight as of this encounter: 65.3 kg (144 lb).  BP completed using cuff size: regular    Questioned patient about current smoking habits.  Pt. has never smoked.          The following HM Due: NONE    Jesse Orr CMA                "

## 2020-11-29 ENCOUNTER — HEALTH MAINTENANCE LETTER (OUTPATIENT)
Age: 30
End: 2020-11-29

## 2020-12-09 ASSESSMENT — ENCOUNTER SYMPTOMS
NERVOUS/ANXIOUS: 0
WEAKNESS: 0
PARESTHESIAS: 0
DIARRHEA: 0
HEMATOCHEZIA: 0
SHORTNESS OF BREATH: 0
ARTHRALGIAS: 0
ABDOMINAL PAIN: 0
CONSTIPATION: 0
DYSURIA: 0
EYE PAIN: 0
JOINT SWELLING: 0
FEVER: 0
FREQUENCY: 0
CHILLS: 0
COUGH: 0
HEADACHES: 0
HEMATURIA: 0
MYALGIAS: 0
BREAST MASS: 0
HEARTBURN: 0
PALPITATIONS: 0
SORE THROAT: 0
NAUSEA: 0
DIZZINESS: 0

## 2020-12-10 ENCOUNTER — OFFICE VISIT (OUTPATIENT)
Dept: FAMILY MEDICINE | Facility: CLINIC | Age: 30
End: 2020-12-10
Payer: COMMERCIAL

## 2020-12-10 VITALS
BODY MASS INDEX: 23.82 KG/M2 | DIASTOLIC BLOOD PRESSURE: 73 MMHG | WEIGHT: 143 LBS | SYSTOLIC BLOOD PRESSURE: 123 MMHG | HEART RATE: 68 BPM | HEIGHT: 65 IN | TEMPERATURE: 98 F

## 2020-12-10 DIAGNOSIS — Z00.00 ROUTINE GENERAL MEDICAL EXAMINATION AT A HEALTH CARE FACILITY: Primary | ICD-10-CM

## 2020-12-10 DIAGNOSIS — F41.9 ANXIETY: ICD-10-CM

## 2020-12-10 DIAGNOSIS — Z30.41 ORAL CONTRACEPTIVE PILL SURVEILLANCE: ICD-10-CM

## 2020-12-10 PROCEDURE — 99395 PREV VISIT EST AGE 18-39: CPT | Performed by: FAMILY MEDICINE

## 2020-12-10 RX ORDER — NORETHINDRONE AND ETHINYL ESTRADIOL 1 MG-35MCG
1 KIT ORAL DAILY
Qty: 84 TABLET | Refills: 3 | Status: SHIPPED | OUTPATIENT
Start: 2020-12-10 | End: 2021-03-02

## 2020-12-10 RX ORDER — ACETAMINOPHEN AND CODEINE PHOSPHATE 120; 12 MG/5ML; MG/5ML
0.35 SOLUTION ORAL DAILY
Qty: 84 TABLET | Refills: 0 | Status: SHIPPED | OUTPATIENT
Start: 2020-12-10 | End: 2021-03-02

## 2020-12-10 ASSESSMENT — ENCOUNTER SYMPTOMS
BREAST MASS: 0
HEADACHES: 0
MYALGIAS: 0
NERVOUS/ANXIOUS: 0
PALPITATIONS: 0
DYSURIA: 0
NAUSEA: 0
FEVER: 0
WEAKNESS: 0
PARESTHESIAS: 0
ARTHRALGIAS: 0
COUGH: 0
ABDOMINAL PAIN: 0
JOINT SWELLING: 0
SORE THROAT: 0
SHORTNESS OF BREATH: 0
DIARRHEA: 0
HEARTBURN: 0
HEMATOCHEZIA: 0
CONSTIPATION: 0
CHILLS: 0
FREQUENCY: 0
EYE PAIN: 0
DIZZINESS: 0
HEMATURIA: 0

## 2020-12-10 ASSESSMENT — ANXIETY QUESTIONNAIRES
5. BEING SO RESTLESS THAT IT IS HARD TO SIT STILL: SEVERAL DAYS
3. WORRYING TOO MUCH ABOUT DIFFERENT THINGS: MORE THAN HALF THE DAYS
6. BECOMING EASILY ANNOYED OR IRRITABLE: SEVERAL DAYS
GAD7 TOTAL SCORE: 8
1. FEELING NERVOUS, ANXIOUS, OR ON EDGE: SEVERAL DAYS
2. NOT BEING ABLE TO STOP OR CONTROL WORRYING: SEVERAL DAYS
7. FEELING AFRAID AS IF SOMETHING AWFUL MIGHT HAPPEN: NOT AT ALL
IF YOU CHECKED OFF ANY PROBLEMS ON THIS QUESTIONNAIRE, HOW DIFFICULT HAVE THESE PROBLEMS MADE IT FOR YOU TO DO YOUR WORK, TAKE CARE OF THINGS AT HOME, OR GET ALONG WITH OTHER PEOPLE: SOMEWHAT DIFFICULT

## 2020-12-10 ASSESSMENT — PATIENT HEALTH QUESTIONNAIRE - PHQ9
5. POOR APPETITE OR OVEREATING: MORE THAN HALF THE DAYS
SUM OF ALL RESPONSES TO PHQ QUESTIONS 1-9: 4

## 2020-12-10 ASSESSMENT — MIFFLIN-ST. JEOR: SCORE: 1369.52

## 2020-12-10 NOTE — PROGRESS NOTES
SUBJECTIVE:   CC: Martine Lewis is an 30 year old woman who presents for preventive health visit.       Patient has been advised of split billing requirements and indicates understanding: Yes  Healthy Habits:     Getting at least 3 servings of Calcium per day:  Yes    Bi-annual eye exam:  Yes    Dental care twice a year:  Yes    Sleep apnea or symptoms of sleep apnea:  None    Diet:  Regular (no restrictions)    Frequency of exercise:  None    Taking medications regularly:  No    Barriers to taking medications:  Problems remembering to take them    Medication side effects:  Other    PHQ-2 Total Score: 1    Additional concerns today:  Yes    * spot on face to be evaluated     Still taking zoloft.  Some weeks anxiety is better, some weeks it is worse.  Takes medication before bed.    Started to notice palpitations just before taking her dose.  Not nightly.  Has not identifed any triggers. But has been trying to pay attention to caffeine.      Anxiety Follow-Up    How are you doing with your anxiety since your last visit? going well    Are you having other symptoms that might be associated with anxiety? No    Have you had a significant life event? No     Are you feeling depressed? No    Do you have any concerns with your use of alcohol or other drugs? No    Social History     Tobacco Use     Smoking status: Never Smoker     Smokeless tobacco: Never Used   Substance Use Topics     Alcohol use: Yes     Comment: 3 drinks per week     Drug use: No     MICHEAL-7 SCORE 7/18/2019 10/10/2019 12/10/2020   Total Score 8 11 8     PHQ 10/10/2019 12/20/2019 12/10/2020   PHQ-9 Total Score 11 4 4   Q9: Thoughts of better off dead/self-harm past 2 weeks Not at all Not at all Not at all     Last PHQ-9 12/10/2020   1.  Little interest or pleasure in doing things 1   2.  Feeling down, depressed, or hopeless 0   3.  Trouble falling or staying asleep, or sleeping too much 1   4.  Feeling tired or having little energy 1   5.  Poor  appetite or overeating 0   6.  Feeling bad about yourself 0   7.  Trouble concentrating 1   8.  Moving slowly or restless 0   Q9: Thoughts of better off dead/self-harm past 2 weeks 0   PHQ-9 Total Score 4   Difficulty at work, home, or with people Somewhat difficult     MICHEAL-7  12/10/2020   1. Feeling nervous, anxious, or on edge 1   2. Not being able to stop or control worrying 1   3. Worrying too much about different things 2   4. Trouble relaxing 2   5. Being so restless that it is hard to sit still 1   6. Becoming easily annoyed or irritable 1   7. Feeling afraid, as if something awful might happen 0   MICHEAL-7 Total Score 8   If you checked any problems, how difficult have they made it for you to do your work, take care of things at home, or get along with other people? Somewhat difficult         Today's PHQ-2 Score:   PHQ-2 ( 1999 [employer]) 12/9/2020   Q1: Little interest or pleasure in doing things 1   Q2: Feeling down, depressed or hopeless 0   PHQ-2 Score 1   Q1: Little interest or pleasure in doing things Several days   Q2: Feeling down, depressed or hopeless Not at all   PHQ-2 Score 1       Abuse: Current or Past (Physical, Sexual or Emotional) - No  Do you feel safe in your environment? Yes        Social History     Tobacco Use     Smoking status: Never Smoker     Smokeless tobacco: Never Used   Substance Use Topics     Alcohol use: Yes     Comment: 3 drinks per week     If you drink alcohol do you typically have >3 drinks per day or >7 drinks per week? No    No flowsheet data found.    Reviewed orders with patient.  Reviewed health maintenance and updated orders accordingly - Yes          Pertinent mammograms are reviewed under the imaging tab.  History of abnormal Pap smear: NO - age 30- 65 PAP every 3 years recommended  PAP / HPV 9/6/2018 3/4/2016 11/4/2013   PAP NIL NIL NIL     Reviewed and updated as needed this visit by clinical staff  Tobacco  Allergies  Meds  Problems  Med Hx  Surg Hx  Fam Hx   "Soc Hx          Reviewed and updated as needed this visit by Provider   Allergies  Meds  Problems                Review of Systems   Constitutional: Negative for chills and fever.   HENT: Negative for congestion, ear pain, hearing loss and sore throat.    Eyes: Positive for visual disturbance. Negative for pain.   Respiratory: Negative for cough and shortness of breath.    Cardiovascular: Negative for chest pain, palpitations and peripheral edema.   Gastrointestinal: Negative for abdominal pain, constipation, diarrhea, heartburn, hematochezia and nausea.   Breasts:  Negative for tenderness, breast mass and discharge.   Genitourinary: Negative for dysuria, frequency, genital sores, hematuria, pelvic pain, urgency, vaginal bleeding and vaginal discharge.   Musculoskeletal: Negative for arthralgias, joint swelling and myalgias.   Skin: Negative for rash.   Neurological: Negative for dizziness, weakness, headaches and paresthesias.   Psychiatric/Behavioral: Negative for mood changes. The patient is not nervous/anxious.         OBJECTIVE:   /73   Pulse 68   Temp 98  F (36.7  C) (Oral)   Ht 1.651 m (5' 5\")   Wt 64.9 kg (143 lb)   Breastfeeding Yes   BMI 23.80 kg/m    Physical Exam  GENERAL: healthy, alert and no distress  EYES: Eyes grossly normal to inspection, PERRL and conjunctivae and sclerae normal  HENT: normal cephalic/atraumatic and ear canals and TM's normal  NECK: no adenopathy, no asymmetry, masses, or scars and thyroid normal to palpation  RESP: lungs clear to auscultation - no rales, rhonchi or wheezes  BREAST: normal without masses, tenderness or nipple discharge and no palpable axillary masses or adenopathy  CV: regular rate and rhythm, normal S1 S2, no S3 or S4, no murmur, click or rub, no peripheral edema and peripheral pulses strong  ABDOMEN: soft, nontender, no hepatosplenomegaly, no masses and bowel sounds normal   (female): normal female external genitalia, normal urethral meatus, " "vaginal mucosa pink, small skin tag at 7 o'clock position at introitus  MS: no gross musculoskeletal defects noted, no edema  SKIN: no suspicious lesions or rashes, melasma on left cheek  NEURO: Normal strength and tone, mentation intact and speech normal  PSYCH: mentation appears normal, affect normal/bright    Diagnostic Test Results:  Labs reviewed in Epic    ASSESSMENT/PLAN:   (Z00.00) Routine general medical examination at a health care facility  (primary encounter diagnosis)  Comment: healthy  Plan:  Other health care maintenance up to date per chart or patient report.  Discussed not checking baseline lipids until breastfeeding is complete      (Z30.41) Oral contraceptive pill surveillance  Comment:   Plan: norethindrone (MICRONOR) 0.35 MG tablet,         norethindrone-ethinyl estradiol (ALAYCEN 1/35)         1-35 MG-MCG tablet        Still breastfeeding.   Continue on micronor until done breastfeeding, and then desires return to combined OCP's    (F41.9) Anxiety  Comment: doing well  Plan: sertraline (ZOLOFT) 50 MG tablet        Continue current medication    Discussed that palpitations not likely related to medication.  More likely related to fatigue, caffeine intake, and/or dehydration.  Discussed warning signs/symptoms for which she needs followup.        Patient has been advised of split billing requirements and indicates understanding: Yes  COUNSELING:  Reviewed preventive health counseling, as reflected in patient instructions    Estimated body mass index is 23.8 kg/m  as calculated from the following:    Height as of this encounter: 1.651 m (5' 5\").    Weight as of this encounter: 64.9 kg (143 lb).        She reports that she has never smoked. She has never used smokeless tobacco.      Counseling Resources:  ATP IV Guidelines  Pooled Cohorts Equation Calculator  Breast Cancer Risk Calculator  BRCA-Related Cancer Risk Assessment: FHS-7 Tool  FRAX Risk Assessment  ICSI Preventive Guidelines  Dietary " Guidelines for Americans, 2010  USDA's MyPlate  ASA Prophylaxis  Lung CA Screening    Niharika Stewart MD  [facility]

## 2020-12-11 ASSESSMENT — ANXIETY QUESTIONNAIRES: GAD7 TOTAL SCORE: 8

## 2021-01-18 ENCOUNTER — OFFICE VISIT (OUTPATIENT)
Dept: OBGYN | Facility: CLINIC | Age: 31
End: 2021-01-18
Payer: COMMERCIAL

## 2021-01-18 VITALS
BODY MASS INDEX: 24.16 KG/M2 | DIASTOLIC BLOOD PRESSURE: 62 MMHG | HEIGHT: 65 IN | SYSTOLIC BLOOD PRESSURE: 110 MMHG | WEIGHT: 145 LBS

## 2021-01-18 DIAGNOSIS — N92.1 BREAKTHROUGH BLEEDING ON BIRTH CONTROL PILLS: Primary | ICD-10-CM

## 2021-01-18 PROCEDURE — 99212 OFFICE O/P EST SF 10 MIN: CPT | Performed by: OBSTETRICS & GYNECOLOGY

## 2021-01-18 ASSESSMENT — MIFFLIN-ST. JEOR: SCORE: 1378.6

## 2021-01-18 NOTE — NURSING NOTE
"Chief Complaint   Patient presents with     Contraception     Spotting between packs        Initial /62 (BP Location: Right arm, Patient Position: Sitting, Cuff Size: Adult Regular)   Ht 1.651 m (5' 5\")   Wt 65.8 kg (145 lb)   LMP 2021 (Within Days)   BMI 24.13 kg/m   Estimated body mass index is 24.13 kg/m  as calculated from the following:    Height as of this encounter: 1.651 m (5' 5\").    Weight as of this encounter: 65.8 kg (145 lb).  BP completed using cuff size: regular    Questioned patient about current smoking habits.  Pt. has never smoked.          The following HM Due: NONE    Jesse Orr CMA                "

## 2021-03-01 ENCOUNTER — TELEPHONE (OUTPATIENT)
Dept: FAMILY MEDICINE | Facility: CLINIC | Age: 31
End: 2021-03-01

## 2021-03-01 DIAGNOSIS — Z30.41 ORAL CONTRACEPTIVE PILL SURVEILLANCE: ICD-10-CM

## 2021-03-02 ENCOUNTER — TELEPHONE (OUTPATIENT)
Dept: FAMILY MEDICINE | Facility: CLINIC | Age: 31
End: 2021-03-02

## 2021-03-02 DIAGNOSIS — Z30.41 ORAL CONTRACEPTIVE PILL SURVEILLANCE: ICD-10-CM

## 2021-03-02 RX ORDER — NORETHINDRONE AND ETHINYL ESTRADIOL 1 MG-35MCG
1 KIT ORAL DAILY
Qty: 84 TABLET | Refills: 3 | Status: SHIPPED | OUTPATIENT
Start: 2021-03-02 | End: 2021-12-21

## 2021-03-02 RX ORDER — ACETAMINOPHEN AND CODEINE PHOSPHATE 120; 12 MG/5ML; MG/5ML
0.35 SOLUTION ORAL DAILY
Qty: 84 TABLET | Refills: 0 | Status: CANCELLED | OUTPATIENT
Start: 2021-03-02

## 2021-03-02 NOTE — TELEPHONE ENCOUNTER
Patient is requesting both norethindrone (minipill) and combination OCP's.    Please clarify which type of birth control pills she is taking - or if she is in the process of transitioning from minipill to the combination ocp's.    If in the process of transitioning, okay to provide refills for transition.

## 2021-03-02 NOTE — TELEPHONE ENCOUNTER
Patient is requesting both norethindrone (minipill) and combination OCP's.    Please clarify which type of birth control pills she is taking - or if she is in the process of transitioning from minipill to the combination ocp's.    If she has transitioned to the combined OCP's (this prescription) okay to provide refills as pended for one year

## 2021-03-02 NOTE — TELEPHONE ENCOUNTER
[facility] # [id number] faxed Patient Request New RX    Pt. is requesting a New Rx for birth control We previously received Norethindrone but she said it should be a new med.   Thanks!

## 2021-03-02 NOTE — TELEPHONE ENCOUNTER
Called pt,  she just finished up the norethindrone and is now transitioning to the Alaycen. New rx sent to the pharmacy.    Britt Alcaraz RN

## 2021-09-25 ENCOUNTER — HEALTH MAINTENANCE LETTER (OUTPATIENT)
Age: 31
End: 2021-09-25

## 2021-12-21 ENCOUNTER — OFFICE VISIT (OUTPATIENT)
Dept: FAMILY MEDICINE | Facility: CLINIC | Age: 31
End: 2021-12-21
Payer: COMMERCIAL

## 2021-12-21 VITALS
BODY MASS INDEX: 25.43 KG/M2 | SYSTOLIC BLOOD PRESSURE: 124 MMHG | TEMPERATURE: 98 F | OXYGEN SATURATION: 100 % | WEIGHT: 152.6 LBS | HEART RATE: 72 BPM | HEIGHT: 65 IN | RESPIRATION RATE: 16 BRPM | DIASTOLIC BLOOD PRESSURE: 76 MMHG

## 2021-12-21 DIAGNOSIS — Z30.41 ORAL CONTRACEPTIVE PILL SURVEILLANCE: ICD-10-CM

## 2021-12-21 DIAGNOSIS — Z00.00 ROUTINE GENERAL MEDICAL EXAMINATION AT A HEALTH CARE FACILITY: Primary | ICD-10-CM

## 2021-12-21 DIAGNOSIS — Z12.4 SCREENING FOR CERVICAL CANCER: ICD-10-CM

## 2021-12-21 DIAGNOSIS — F41.9 ANXIETY: ICD-10-CM

## 2021-12-21 DIAGNOSIS — Z13.220 SCREENING FOR HYPERLIPIDEMIA: ICD-10-CM

## 2021-12-21 PROCEDURE — 99395 PREV VISIT EST AGE 18-39: CPT | Performed by: FAMILY MEDICINE

## 2021-12-21 PROCEDURE — 99214 OFFICE O/P EST MOD 30 MIN: CPT | Mod: 25 | Performed by: FAMILY MEDICINE

## 2021-12-21 RX ORDER — NORETHINDRONE AND ETHINYL ESTRADIOL 1 MG-35MCG
1 KIT ORAL DAILY
Qty: 84 TABLET | Refills: 3 | Status: SHIPPED | OUTPATIENT
Start: 2021-12-21 | End: 2023-01-10

## 2021-12-21 RX ORDER — SERTRALINE HYDROCHLORIDE 100 MG/1
100 TABLET, FILM COATED ORAL DAILY
Qty: 90 TABLET | Refills: 3 | Status: SHIPPED | OUTPATIENT
Start: 2021-12-21 | End: 2023-01-12

## 2021-12-21 ASSESSMENT — ENCOUNTER SYMPTOMS
CHILLS: 0
NERVOUS/ANXIOUS: 1
DIZZINESS: 0
SORE THROAT: 0
DIARRHEA: 0
HEADACHES: 0
CONSTIPATION: 0
WEAKNESS: 0
JOINT SWELLING: 0
COUGH: 0
NAUSEA: 0
FEVER: 0
FREQUENCY: 0
HEMATOCHEZIA: 0
ARTHRALGIAS: 0
PARESTHESIAS: 0
MYALGIAS: 0
PALPITATIONS: 0
HEMATURIA: 0
DYSURIA: 0
SHORTNESS OF BREATH: 0
ABDOMINAL PAIN: 0
HEARTBURN: 0
EYE PAIN: 0

## 2021-12-21 ASSESSMENT — ANXIETY QUESTIONNAIRES
GAD7 TOTAL SCORE: 12
7. FEELING AFRAID AS IF SOMETHING AWFUL MIGHT HAPPEN: SEVERAL DAYS
3. WORRYING TOO MUCH ABOUT DIFFERENT THINGS: NEARLY EVERY DAY
1. FEELING NERVOUS, ANXIOUS, OR ON EDGE: MORE THAN HALF THE DAYS
6. BECOMING EASILY ANNOYED OR IRRITABLE: SEVERAL DAYS
2. NOT BEING ABLE TO STOP OR CONTROL WORRYING: NEARLY EVERY DAY
IF YOU CHECKED OFF ANY PROBLEMS ON THIS QUESTIONNAIRE, HOW DIFFICULT HAVE THESE PROBLEMS MADE IT FOR YOU TO DO YOUR WORK, TAKE CARE OF THINGS AT HOME, OR GET ALONG WITH OTHER PEOPLE: SOMEWHAT DIFFICULT
5. BEING SO RESTLESS THAT IT IS HARD TO SIT STILL: SEVERAL DAYS

## 2021-12-21 ASSESSMENT — PAIN SCALES - GENERAL: PAINLEVEL: NO PAIN (0)

## 2021-12-21 ASSESSMENT — MIFFLIN-ST. JEOR: SCORE: 1408.07

## 2021-12-21 ASSESSMENT — PATIENT HEALTH QUESTIONNAIRE - PHQ9: 5. POOR APPETITE OR OVEREATING: SEVERAL DAYS

## 2021-12-21 NOTE — PROGRESS NOTES
SUBJECTIVE:   CC: Martine Lewis is an 31 year old woman who presents for preventive health visit.       Patient has been advised of split billing requirements and indicates understanding: Yes  Healthy Habits:     Getting at least 3 servings of Calcium per day:  Yes    Bi-annual eye exam:  Yes    Dental care twice a year:  Yes    Sleep apnea or symptoms of sleep apnea:  None    Diet:  Regular (no restrictions)    Frequency of exercise:  1 day/week    Duration of exercise:  30-45 minutes    Taking medications regularly:  Yes    Medication side effects:  None    PHQ-2 Total Score: 1    Additional concerns today:  Yes    Dad had stents at ages 42, 50.  Mom reports abnormal colonoscopy - polyps.    Finds that she still has difficulty sleeping at times.  Feels that low mood is well controlled.  But feels more anxious again.  After giving birth she felt less anxious.  But now that son is 2 her anxiety level feels like it did prior to starting sertraline.    Today's PHQ-2 Score:   PHQ-2 ( 1999 Pfizer) 12/21/2021   Q1: Little interest or pleasure in doing things 1   Q2: Feeling down, depressed or hopeless 0   PHQ-2 Score 1   PHQ-2 Total Score (12-17 Years)- Positive if 3 or more points; Administer PHQ-A if positive -   Q1: Little interest or pleasure in doing things Several days   Q2: Feeling down, depressed or hopeless Not at all   PHQ-2 Score 1       Abuse: Current or Past (Physical, Sexual or Emotional) - NO  Do you feel safe in your environment? YES    Have you ever done Advance Care Planning? (For example, a Health Directive, POLST, or a discussion with a medical provider or your loved ones about your wishes): No, advance care planning information given to patient to review.  Patient plans to discuss their wishes with loved ones or provider.      Social History     Tobacco Use     Smoking status: Never Smoker     Smokeless tobacco: Never Used   Substance Use Topics     Alcohol use: Yes     Comment: 3 drinks per  "week     If you drink alcohol do you typically have >3 drinks per day or >7 drinks per week? No    Alcohol Use 12/21/2021   Prescreen: >3 drinks/day or >7 drinks/week? No   Prescreen: >3 drinks/day or >7 drinks/week? -   No flowsheet data found.    Reviewed orders with patient.  Reviewed health maintenance and updated orders accordingly - Yes  Labs reviewed in EPIC    Breast Cancer Screening:    Breast CA Risk Assessment (FHS-7) 12/21/2021   Do you have a family history of breast, colon, or ovarian cancer? No / Unknown           Pertinent mammograms are reviewed under the imaging tab.    History of abnormal Pap smear: NO - age 30- 65 PAP every 3 years recommended  PAP / HPV 9/6/2018 3/4/2016 11/4/2013   PAP (Historical) NIL NIL NIL     Reviewed and updated as needed this visit by clinical staff  Tobacco  Allergies  Meds  Problems  Med Hx  Surg Hx  Fam Hx         Reviewed and updated as needed this visit by Provider  Tobacco  Allergies  Meds  Problems  Med Hx  Surg Hx  Fam Hx            Review of Systems   Constitutional: Negative for chills and fever.   HENT: Negative for congestion, ear pain, hearing loss and sore throat.    Eyes: Negative for pain and visual disturbance.   Respiratory: Negative for cough and shortness of breath.    Cardiovascular: Negative for chest pain, palpitations and peripheral edema.   Gastrointestinal: Negative for abdominal pain, constipation, diarrhea, heartburn, hematochezia and nausea.   Genitourinary: Negative for dysuria, frequency, genital sores, hematuria and urgency.   Musculoskeletal: Negative for arthralgias, joint swelling and myalgias.   Skin: Negative for rash.   Neurological: Negative for dizziness, weakness, headaches and paresthesias.   Psychiatric/Behavioral: Negative for mood changes. The patient is nervous/anxious.           OBJECTIVE:   /76 (BP Location: Right arm)   Pulse 72   Temp 98  F (36.7  C) (Oral)   Resp 16   Ht 1.651 m (5' 5\")   Wt 69.2 " kg (152 lb 9.6 oz)   LMP 12/03/2021   SpO2 100%   BMI 25.39 kg/m    Physical Exam  GENERAL: healthy, alert and no distress  EYES: Eyes grossly normal to inspection, PERRL and conjunctivae and sclerae normal  HENT: normal cephalic/atraumatic and ear canals and TM's normal  NECK: no adenopathy, no asymmetry, masses, or scars and thyroid normal to palpation  RESP: lungs clear to auscultation - no rales, rhonchi or wheezes  BREAST: normal without masses, tenderness or nipple discharge and no palpable axillary masses or adenopathy  CV: regular rate and rhythm, normal S1 S2, no S3 or S4, no murmur, click or rub, no peripheral edema and peripheral pulses strong  ABDOMEN: soft, nontender, no hepatosplenomegaly, no masses and bowel sounds normal   (female): normal female external genitalia, normal urethral meatus, vaginal mucosa pink, moist, well rugated, and normal cervix/adnexa/uterus without masses or discharge  MS: no gross musculoskeletal defects noted, no edema  SKIN: no suspicious lesions or rashes, benign appearing raised, flesh colored 4mm nevus on right posterior calf   NEURO: Normal strength and tone, mentation intact and speech normal  PSYCH: mentation appears normal, affect normal/bright    Diagnostic Test Results:  Labs reviewed in Epic    ASSESSMENT/PLAN:   (Z00.00) Routine general medical examination at a health care facility  (primary encounter diagnosis)  Comment: healthy  Plan:  Other health care maintenance up to date per chart or patient report.  Declines COVID booster today - due to possible side effects. Has booster scheduled for the near future.    (Z13.220) Screening for hyperlipidemia  Comment: family history of premature CAD in father  Plan: Lipid panel reflex to direct LDL Fasting        adjust therapy based on labs      (Z30.41) Oral contraceptive pill surveillance  Comment:   Plan: norethindrone-ethinyl estradiol (ALAYCEN 1/35)         1-35 MG-MCG tablet        Continue current  "medication      (Z12.4) Screening for cervical cancer  Comment:   Plan: Gynecologic Cytology (PAP Smear)            (F41.9) Anxiety  Comment: not well controlled again  Plan: sertraline (ZOLOFT) 100 MG tablet        Increased dosage and will send delayed MyChart message for 6 weeks to check in. If symptoms not improving then will advise virtual visit for discussion.  If symptoms improving, will advise check in in 4 months.      Patient has been advised of split billing requirements and indicates understanding: Yes  COUNSELING:  Reviewed preventive health counseling, as reflected in patient instructions    Estimated body mass index is 25.39 kg/m  as calculated from the following:    Height as of this encounter: 1.651 m (5' 5\").    Weight as of this encounter: 69.2 kg (152 lb 9.6 oz).        She reports that she has never smoked. She has never used smokeless tobacco.      Counseling Resources:  ATP IV Guidelines  Pooled Cohorts Equation Calculator  Breast Cancer Risk Calculator  BRCA-Related Cancer Risk Assessment: FHS-7 Tool  FRAX Risk Assessment  ICSI Preventive Guidelines  Dietary Guidelines for Americans, 2010  USDA's MyPlate  ASA Prophylaxis  Lung CA Screening    Niharika Stewart MD  [facility]  "

## 2021-12-21 NOTE — PATIENT INSTRUCTIONS

## 2021-12-22 ASSESSMENT — ANXIETY QUESTIONNAIRES: GAD7 TOTAL SCORE: 12

## 2023-01-10 ENCOUNTER — TELEPHONE (OUTPATIENT)
Dept: FAMILY MEDICINE | Facility: CLINIC | Age: 33
End: 2023-01-10

## 2023-01-10 DIAGNOSIS — Z30.41 ORAL CONTRACEPTIVE PILL SURVEILLANCE: ICD-10-CM

## 2023-01-10 DIAGNOSIS — F41.9 ANXIETY: ICD-10-CM

## 2023-01-10 RX ORDER — NORETHINDRONE AND ETHINYL ESTRADIOL 1 MG-35MCG
KIT ORAL
Qty: 84 TABLET | Refills: 1 | Status: SHIPPED | OUTPATIENT
Start: 2023-01-10 | End: 2023-02-28

## 2023-01-10 NOTE — TELEPHONE ENCOUNTER
6  Months of refills provided  And I am happy to extend that.  But I noted that I refilled sertraline at her last annual visit in 2021 as well.  Is she still taking that medication? If so, I am also happy to provide refills, but would want to check in within the next 3-4 months as well.

## 2023-01-11 NOTE — TELEPHONE ENCOUNTER
Routing to PCP    Patient Continues on Sertraline    RN pended.    Patient has preventative exam scheduled 2/28.    RN called and spoke with patient regarding above.    Jus Crenshaw, RN, BSN, PHN  [facility]

## 2023-01-12 RX ORDER — SERTRALINE HYDROCHLORIDE 100 MG/1
100 TABLET, FILM COATED ORAL DAILY
Qty: 90 TABLET | Refills: 0 | Status: SHIPPED | OUTPATIENT
Start: 2023-01-12 | End: 2023-02-27

## 2023-02-27 ENCOUNTER — TELEPHONE (OUTPATIENT)
Dept: FAMILY MEDICINE | Facility: CLINIC | Age: 33
End: 2023-02-27
Payer: COMMERCIAL

## 2023-02-27 DIAGNOSIS — F41.9 ANXIETY: ICD-10-CM

## 2023-02-27 RX ORDER — SERTRALINE HYDROCHLORIDE 100 MG/1
TABLET, FILM COATED ORAL
Qty: 90 TABLET | Refills: 0 | Status: SHIPPED | OUTPATIENT
Start: 2023-02-27 | End: 2023-02-28

## 2023-02-27 ASSESSMENT — ENCOUNTER SYMPTOMS
MYALGIAS: 0
SORE THROAT: 0
BREAST MASS: 0
DYSURIA: 0
ABDOMINAL PAIN: 0
NERVOUS/ANXIOUS: 0
FEVER: 0
HEMATURIA: 0
DIZZINESS: 0
ARTHRALGIAS: 0
WEAKNESS: 0
JOINT SWELLING: 0
EYE PAIN: 0
COUGH: 0
CHILLS: 0
FREQUENCY: 0
NAUSEA: 0
HEMATOCHEZIA: 0
DIARRHEA: 0
HEADACHES: 0
CONSTIPATION: 0
PALPITATIONS: 0
HEARTBURN: 0
SHORTNESS OF BREATH: 0
PARESTHESIAS: 0

## 2023-02-27 NOTE — TELEPHONE ENCOUNTER
This patient is overdue for advised follow up, which is why he/she is out of refills.  I do not want this patient to go without medication - so one refill has been provided to allow time for appointment scheduling.    Please assist patient with scheduling her yearly preventive visit with me.  If it is scheduled out longer than 3 months and she is doing well, I can extend her refills.

## 2023-02-28 ENCOUNTER — OFFICE VISIT (OUTPATIENT)
Dept: FAMILY MEDICINE | Facility: CLINIC | Age: 33
End: 2023-02-28
Payer: COMMERCIAL

## 2023-02-28 VITALS
OXYGEN SATURATION: 99 % | RESPIRATION RATE: 15 BRPM | WEIGHT: 168.4 LBS | SYSTOLIC BLOOD PRESSURE: 110 MMHG | TEMPERATURE: 98.5 F | HEIGHT: 65 IN | BODY MASS INDEX: 28.06 KG/M2 | DIASTOLIC BLOOD PRESSURE: 66 MMHG | HEART RATE: 75 BPM

## 2023-02-28 DIAGNOSIS — F41.9 ANXIETY: ICD-10-CM

## 2023-02-28 DIAGNOSIS — Z11.59 NEED FOR HEPATITIS C SCREENING TEST: ICD-10-CM

## 2023-02-28 DIAGNOSIS — Z13.220 SCREENING FOR HYPERLIPIDEMIA: ICD-10-CM

## 2023-02-28 DIAGNOSIS — R00.2 PALPITATIONS: ICD-10-CM

## 2023-02-28 DIAGNOSIS — Z12.4 CERVICAL CANCER SCREENING: ICD-10-CM

## 2023-02-28 DIAGNOSIS — Z30.41 ORAL CONTRACEPTIVE PILL SURVEILLANCE: ICD-10-CM

## 2023-02-28 DIAGNOSIS — Z00.00 ROUTINE GENERAL MEDICAL EXAMINATION AT A HEALTH CARE FACILITY: Primary | ICD-10-CM

## 2023-02-28 DIAGNOSIS — R68.82 DECREASED LIBIDO: ICD-10-CM

## 2023-02-28 LAB
ANION GAP SERPL CALCULATED.3IONS-SCNC: 11 MMOL/L (ref 7–15)
BUN SERPL-MCNC: 11.3 MG/DL (ref 6–20)
CALCIUM SERPL-MCNC: 9.8 MG/DL (ref 8.6–10)
CHLORIDE SERPL-SCNC: 102 MMOL/L (ref 98–107)
CHOLEST SERPL-MCNC: 249 MG/DL
CREAT SERPL-MCNC: 0.77 MG/DL (ref 0.51–0.95)
DEPRECATED HCO3 PLAS-SCNC: 27 MMOL/L (ref 22–29)
GFR SERPL CREATININE-BSD FRML MDRD: >90 ML/MIN/1.73M2
GLUCOSE SERPL-MCNC: 88 MG/DL (ref 70–99)
HDLC SERPL-MCNC: 58 MG/DL
HGB BLD-MCNC: 12.8 G/DL (ref 11.7–15.7)
LDLC SERPL CALC-MCNC: 158 MG/DL
NONHDLC SERPL-MCNC: 191 MG/DL
POTASSIUM SERPL-SCNC: 3.8 MMOL/L (ref 3.4–5.3)
SODIUM SERPL-SCNC: 140 MMOL/L (ref 136–145)
TRIGL SERPL-MCNC: 166 MG/DL
TSH SERPL DL<=0.005 MIU/L-ACNC: 2.12 UIU/ML (ref 0.3–4.2)

## 2023-02-28 PROCEDURE — 80061 LIPID PANEL: CPT | Performed by: FAMILY MEDICINE

## 2023-02-28 PROCEDURE — 85018 HEMOGLOBIN: CPT | Performed by: FAMILY MEDICINE

## 2023-02-28 PROCEDURE — 87624 HPV HI-RISK TYP POOLED RSLT: CPT | Performed by: FAMILY MEDICINE

## 2023-02-28 PROCEDURE — 84443 ASSAY THYROID STIM HORMONE: CPT | Performed by: FAMILY MEDICINE

## 2023-02-28 PROCEDURE — 86803 HEPATITIS C AB TEST: CPT | Performed by: FAMILY MEDICINE

## 2023-02-28 PROCEDURE — 80048 BASIC METABOLIC PNL TOTAL CA: CPT | Performed by: FAMILY MEDICINE

## 2023-02-28 PROCEDURE — 36415 COLL VENOUS BLD VENIPUNCTURE: CPT | Performed by: FAMILY MEDICINE

## 2023-02-28 PROCEDURE — G0145 SCR C/V CYTO,THINLAYER,RESCR: HCPCS | Performed by: FAMILY MEDICINE

## 2023-02-28 PROCEDURE — 99395 PREV VISIT EST AGE 18-39: CPT | Performed by: FAMILY MEDICINE

## 2023-02-28 PROCEDURE — 93000 ELECTROCARDIOGRAM COMPLETE: CPT | Performed by: FAMILY MEDICINE

## 2023-02-28 PROCEDURE — 99214 OFFICE O/P EST MOD 30 MIN: CPT | Mod: 25 | Performed by: FAMILY MEDICINE

## 2023-02-28 RX ORDER — SERTRALINE HYDROCHLORIDE 100 MG/1
100 TABLET, FILM COATED ORAL DAILY
Qty: 90 TABLET | Refills: 0 | Status: CANCELLED | OUTPATIENT
Start: 2023-02-28

## 2023-02-28 RX ORDER — SERTRALINE HYDROCHLORIDE 100 MG/1
150 TABLET, FILM COATED ORAL DAILY
Qty: 135 TABLET | Refills: 1 | Status: SHIPPED | OUTPATIENT
Start: 2023-02-28 | End: 2023-05-23

## 2023-02-28 RX ORDER — NORETHINDRONE AND ETHINYL ESTRADIOL 1 MG-35MCG
1 KIT ORAL DAILY
Qty: 84 TABLET | Refills: 3 | Status: SHIPPED | OUTPATIENT
Start: 2023-02-28 | End: 2024-01-08

## 2023-02-28 ASSESSMENT — ENCOUNTER SYMPTOMS
PALPITATIONS: 0
WEAKNESS: 0
HEADACHES: 0
EYE PAIN: 0
ARTHRALGIAS: 0
PARESTHESIAS: 0
CONSTIPATION: 0
ABDOMINAL PAIN: 0
DIZZINESS: 0
DIARRHEA: 0
NAUSEA: 0
BREAST MASS: 0
DYSURIA: 0
SORE THROAT: 0
HEMATURIA: 0
COUGH: 0
NERVOUS/ANXIOUS: 0
CHILLS: 0
SHORTNESS OF BREATH: 0
MYALGIAS: 0
FEVER: 0
JOINT SWELLING: 0
HEMATOCHEZIA: 0
FREQUENCY: 0
HEARTBURN: 0

## 2023-02-28 ASSESSMENT — ANXIETY QUESTIONNAIRES
6. BECOMING EASILY ANNOYED OR IRRITABLE: SEVERAL DAYS
IF YOU CHECKED OFF ANY PROBLEMS ON THIS QUESTIONNAIRE, HOW DIFFICULT HAVE THESE PROBLEMS MADE IT FOR YOU TO DO YOUR WORK, TAKE CARE OF THINGS AT HOME, OR GET ALONG WITH OTHER PEOPLE: SOMEWHAT DIFFICULT
GAD7 TOTAL SCORE: 10
1. FEELING NERVOUS, ANXIOUS, OR ON EDGE: MORE THAN HALF THE DAYS
7. FEELING AFRAID AS IF SOMETHING AWFUL MIGHT HAPPEN: SEVERAL DAYS
2. NOT BEING ABLE TO STOP OR CONTROL WORRYING: MORE THAN HALF THE DAYS
5. BEING SO RESTLESS THAT IT IS HARD TO SIT STILL: SEVERAL DAYS
3. WORRYING TOO MUCH ABOUT DIFFERENT THINGS: MORE THAN HALF THE DAYS
GAD7 TOTAL SCORE: 10

## 2023-02-28 ASSESSMENT — PAIN SCALES - GENERAL: PAINLEVEL: NO PAIN (0)

## 2023-02-28 ASSESSMENT — PATIENT HEALTH QUESTIONNAIRE - PHQ9: 5. POOR APPETITE OR OVEREATING: SEVERAL DAYS

## 2023-02-28 NOTE — PROGRESS NOTES
"   SUBJECTIVE:   CC: Martine is an 32 year old who presents for preventive health visit.     Patient has been advised of split billing requirements and indicates understanding: Yes  Healthy Habits:     Getting at least 3 servings of Calcium per day:  Yes    Bi-annual eye exam:  Yes    Dental care twice a year:  Yes    Sleep apnea or symptoms of sleep apnea:  None    Diet:  Regular (no restrictions)    Frequency of exercise:  2-3 days/week    Duration of exercise:  Greater than 60 minutes    Taking medications regularly:  Yes    PHQ-2 Total Score: 2    Additional concerns today:  Yes  as other concerns to discuss:  ADHD / anxiety   Side effects of medication   Heart palpitations  - has felt them on occasion again.   Seconds, more common in the evening.   Not daily. Has not yet happened this week.   Drinks caffeine daily. Ranges from 1 large latte, to latte and soda.   No associated shortness of breath, chest pain, nausea, or diaphoresis.   Minimal exercise at this time.   Sometimes walks her dog - doesn't get her heart rate up when she does this    Does do some yoga at times.   \"enjoys being a cough potato\"    In high school wondered if she has ADHD.  Struggled in biology - to completely read a page in her textbook.  Did well in classes otherwise.    Does not provide any additional evidence about school.  But finds that learning through touching things/feeling things is important to her.    Has been told to ask about genomic testing for psych meds.  And wonders about MTM evaluation.    Does note a decreased libido   Noticed this after birth of her child  - and pain after birth   Is working with PT.  Perhaps some increased sleepiness on medication.    No further panic attacks which she appreciates.  Continues to work with a therapist.   But still feels anxious at times, and wants to \"feel normal\"        Today's PHQ-2 Score:   PHQ-2 ( 1999 Pfizer) 2/27/2023   Q1: Little interest or pleasure in doing things 1   Q2: Feeling " down, depressed or hopeless 1   PHQ-2 Score 2   PHQ-2 Total Score (12-17 Years)- Positive if 3 or more points; Administer PHQ-A if positive -   Q1: Little interest or pleasure in doing things Several days   Q2: Feeling down, depressed or hopeless Several days   PHQ-2 Score 2           Social History     Tobacco Use     Smoking status: Never     Smokeless tobacco: Never   Substance Use Topics     Alcohol use: Yes     Comment: 3 drinks per week         Alcohol Use 2/27/2023   Prescreen: >3 drinks/day or >7 drinks/week? No   No flowsheet data found.    Reviewed orders with patient.  Reviewed health maintenance and updated orders accordingly - Yes  Labs reviewed in EPIC    Breast Cancer Screening:    Breast CA Risk Assessment (FHS-7) 12/21/2021   Do you have a family history of breast, colon, or ovarian cancer? No / Unknown       click delete button to remove this line now  Patient under 40 years of age: Routine Mammogram Screening not recommended.   Pertinent mammograms are reviewed under the imaging tab.    History of abnormal Pap smear: NO - age 30- 65 PAP every 3 years recommended  PAP / HPV 9/6/2018 3/4/2016 11/4/2013   PAP (Historical) NIL NIL NIL     Reviewed and updated as needed this visit by clinical staff   Tobacco  Allergies               Reviewed and updated as needed this visit by Provider     Meds  Problems                Review of Systems   Constitutional: Negative for chills and fever.   HENT: Negative for congestion, ear pain, hearing loss and sore throat.    Eyes: Negative for pain and visual disturbance.   Respiratory: Negative for cough and shortness of breath.    Cardiovascular: Negative for chest pain, palpitations and peripheral edema.   Gastrointestinal: Negative for abdominal pain, constipation, diarrhea, heartburn, hematochezia and nausea.   Breasts:  Negative for tenderness, breast mass and discharge.   Genitourinary: Negative for dysuria, frequency, genital sores, hematuria, pelvic pain,  "urgency, vaginal bleeding and vaginal discharge.   Musculoskeletal: Negative for arthralgias, joint swelling and myalgias.   Skin: Negative for rash.   Neurological: Negative for dizziness, weakness, headaches and paresthesias.   Psychiatric/Behavioral: Negative for mood changes. The patient is not nervous/anxious.           OBJECTIVE:   /66 (BP Location: Right arm, Patient Position: Sitting, Cuff Size: Adult Regular)   Pulse 75   Temp 98.5  F (36.9  C) (Oral)   Resp 15   Ht 1.651 m (5' 5\")   Wt 76.4 kg (168 lb 6.4 oz)   LMP 02/27/2023   SpO2 99%   BMI 28.02 kg/m    Physical Exam  GENERAL: healthy, alert and no distress  EYES: Eyes grossly normal to inspection, PERRL and conjunctivae and sclerae normal  HENT: normal cephalic/atraumatic and ear canals and TM's normal  NECK: no adenopathy, no asymmetry, masses, or scars and thyroid normal to palpation  RESP: lungs clear to auscultation - no rales, rhonchi or wheezes  BREAST: normal without masses, tenderness or nipple discharge and no palpable axillary masses or adenopathy  CV: regular rate and rhythm, normal S1 S2, no S3 or S4, no murmur, click or rub, no peripheral edema and peripheral pulses strong  ABDOMEN: soft, nontender, no hepatosplenomegaly, no masses and bowel sounds normal   (female): normal female external genitalia, normal urethral meatus, vaginal mucosa pink, moist, well rugated, and normal cervix/adnexa/uterus without masses or discharge. Start of menses noted  MS: no gross musculoskeletal defects noted, no edema  SKIN: no suspicious lesions or rashes  NEURO: Normal strength and tone, mentation intact and speech normal  PSYCH: mentation appears normal, affect normal/bright    Diagnostic Test Results:  Labs reviewed in Epic  EKG - unremarkable    ASSESSMENT/PLAN:   (Z00.00) Routine general medical examination at a health care facility  (primary encounter diagnosis)  Comment: stable health  Plan: REVIEW OF HEALTH MAINTENANCE PROTOCOL " ORDERS        Health Care Maintenance reviewed, measures not yet completed have been discussed.      (F41.9) Anxiety  Comment: no further panic symptoms.  Plan: sertraline (ZOLOFT) 100 MG tablet        Discussed the difference between anxiety and anxious feelings - that medication will assist with anxiety but not necessarily all anxious feelings.  Discussed the pros and cons to genomic testing.    Advised an increased dose in sertraline, with consideration in the future for wellbutrin to assist with libido and fatigue.    Discussed that I do not believe that she has ADHD based on our discussion today, but would be happy to refer for formal neuropsych testing if she desires.    (Z13.220) Screening for hyperlipidemia  Comment:   Plan: Lipid panel reflex to direct LDL Non-fasting        adjust therapy/treatment based on results      (Z12.4) Cervical cancer screening  Comment:   Plan: Pap Screen with HPV - recommended age 30 - 65         years            (Z30.41) Oral contraceptive pill surveillance  Comment:   Plan: norethindrone-ethinyl estradiol (ALAYCEN 1/35)         1-35 MG-MCG tablet        Continue current medication      (Z11.59) Need for hepatitis C screening test  Comment: consents to testing, as advised by CDC guidelines   Plan: Hepatitis C Screen Reflex to HCV RNA Quant and         Genotype, Hepatitis C Screen Reflex to HCV RNA         Quant and Genotype        adjust therapy/treatment based on results      (R00.2) Palpitations  Comment: intermittent, self limited, not daily  Plan: Basic metabolic panel  (Ca, Cl, CO2, Creat,         Gluc, K, Na, BUN), TSH with free T4 reflex,         Hemoglobin, EKG 12-lead complete w/read -         Clinics, Adult Leadless EKG Monitor 3 to 7 Days        adjust therapy/treatment based on results      (R68.82) Decreased libido  Comment: likely multifactorial  Plan: see above.          COUNSELING:  Reviewed preventive health counseling, as reflected in patient  instructions        She reports that she has never smoked. She has never used smokeless tobacco.          Niharika Stewart MD  [facility] [place]

## 2023-03-01 LAB — HCV AB SERPL QL IA: NONREACTIVE

## 2023-03-02 LAB
BKR LAB AP GYN ADEQUACY: NORMAL
BKR LAB AP GYN INTERPRETATION: NORMAL
BKR LAB AP HPV REFLEX: NORMAL
BKR LAB AP PREVIOUS ABNORMAL: NORMAL
PATH REPORT.COMMENTS IMP SPEC: NORMAL
PATH REPORT.COMMENTS IMP SPEC: NORMAL
PATH REPORT.RELEVANT HX SPEC: NORMAL

## 2023-03-06 LAB
HUMAN PAPILLOMA VIRUS 16 DNA: NEGATIVE
HUMAN PAPILLOMA VIRUS 18 DNA: NEGATIVE
HUMAN PAPILLOMA VIRUS FINAL DIAGNOSIS: NORMAL
HUMAN PAPILLOMA VIRUS OTHER HR: NEGATIVE

## 2023-05-22 ASSESSMENT — ANXIETY QUESTIONNAIRES
5. BEING SO RESTLESS THAT IT IS HARD TO SIT STILL: SEVERAL DAYS
6. BECOMING EASILY ANNOYED OR IRRITABLE: SEVERAL DAYS
1. FEELING NERVOUS, ANXIOUS, OR ON EDGE: MORE THAN HALF THE DAYS
2. NOT BEING ABLE TO STOP OR CONTROL WORRYING: MORE THAN HALF THE DAYS
IF YOU CHECKED OFF ANY PROBLEMS ON THIS QUESTIONNAIRE, HOW DIFFICULT HAVE THESE PROBLEMS MADE IT FOR YOU TO DO YOUR WORK, TAKE CARE OF THINGS AT HOME, OR GET ALONG WITH OTHER PEOPLE: SOMEWHAT DIFFICULT
8. IF YOU CHECKED OFF ANY PROBLEMS, HOW DIFFICULT HAVE THESE MADE IT FOR YOU TO DO YOUR WORK, TAKE CARE OF THINGS AT HOME, OR GET ALONG WITH OTHER PEOPLE?: SOMEWHAT DIFFICULT
GAD7 TOTAL SCORE: 11
7. FEELING AFRAID AS IF SOMETHING AWFUL MIGHT HAPPEN: SEVERAL DAYS
4. TROUBLE RELAXING: MORE THAN HALF THE DAYS
GAD7 TOTAL SCORE: 11
7. FEELING AFRAID AS IF SOMETHING AWFUL MIGHT HAPPEN: SEVERAL DAYS
3. WORRYING TOO MUCH ABOUT DIFFERENT THINGS: MORE THAN HALF THE DAYS

## 2023-05-23 ENCOUNTER — VIRTUAL VISIT (OUTPATIENT)
Dept: FAMILY MEDICINE | Facility: CLINIC | Age: 33
End: 2023-05-23
Payer: COMMERCIAL

## 2023-05-23 DIAGNOSIS — F41.9 ANXIETY: Primary | ICD-10-CM

## 2023-05-23 PROCEDURE — 99213 OFFICE O/P EST LOW 20 MIN: CPT | Mod: VID | Performed by: FAMILY MEDICINE

## 2023-05-23 RX ORDER — SERTRALINE HYDROCHLORIDE 100 MG/1
150 TABLET, FILM COATED ORAL DAILY
Qty: 135 TABLET | Refills: 1 | Status: SHIPPED | OUTPATIENT
Start: 2023-05-23 | End: 2023-08-07

## 2023-05-23 ASSESSMENT — ANXIETY QUESTIONNAIRES: GAD7 TOTAL SCORE: 11

## 2023-05-23 NOTE — PROGRESS NOTES
"Martine is a 32 year old who is being evaluated via a billable video visit.      How would you like to obtain your AVS? MyChart  If the video visit is dropped, the invitation should be resent by: Text to cell phone: [phone]  Will anyone else be joining your video visit? No          Assessment & Plan     (F41.9) Anxiety  (primary encounter diagnosis)  Comment: improved palpitations on higher dose of sertraline.  Plan: Adult Mental Health  Referral        Provided referral for psychologic testing               BMI:   Estimated body mass index is 28.02 kg/m  as calculated from the following:    Height as of 2/28/23: 1.651 m (5' 5\").    Weight as of 2/28/23: 76.4 kg (168 lb 6.4 oz).           Niharika Stewart MD  [facility]    Yadira Farley is a 32 year old, presenting for the following health issues:  Mental Health Problem    History of Present Illness       Mental Health Follow-up:  Patient presents to follow-up on Anxiety.    Patient's anxiety since last visit has been:  Medium  The patient is not having other symptoms associated with anxiety.  Any significant life events: No  Patient is feeling anxious or having panic attacks.  Patient has no concerns about alcohol or drug use.    She eats 2-3 servings of fruits and vegetables daily.She consumes 1 sweetened beverage(s) daily.She exercises with enough effort to increase her heart rate 60 or more minutes per day.  She exercises with enough effort to increase her heart rate 3 or less days per week.   She is taking medications regularly.  Today's MICHEAL-7 Score: 11           5/22/2023    10:42 PM   MICHEAL-7    1. Feeling nervous, anxious, or on edge 2   2. Not being able to stop or control worrying 2   3. Worrying too much about different things 2   4. Trouble relaxing 2   5. Being so restless that it is hard to sit still 1   6. Becoming easily annoyed or irritable 1   7. Feeling afraid, as if something awful might happen 1 "   MICHEAL-7 Total Score 11   If you checked any problems, how difficult have they made it for you to do your work, take care of things at home, or get along with other people? Somewhat difficult       Suicide Assessment Five-step Evaluation and Treatment (SAFE-T)      Still having some heart racing symptoms.  Has improved but is not gone.    But remains concerned that her anxiety symptoms are caused by another underlying disorder such as ADHD.    Review of Systems   Constitutional, HEENT, cardiovascular, pulmonary, gi and gu systems are negative, except as otherwise noted.      Objective           Vitals:  No vitals were obtained today due to virtual visit.    Physical Exam   GENERAL: Healthy, alert and no distress  PSYCH: Mentation appears normal, affect normal/bright, judgement and insight intact, normal speech.  Her video was not working - so I was unable to visualize her today                Video-Visit Details    Type of service:  Video Visit     Originating Location (pt. Location): Home    Distant Location (provider location):  On-site  Platform used for Video Visit: [employer] in [employer]

## 2023-05-31 ENCOUNTER — TELEPHONE (OUTPATIENT)
Dept: BEHAVIORAL HEALTH | Facility: CLINIC | Age: 33
End: 2023-05-31
Payer: COMMERCIAL

## 2023-06-05 ENCOUNTER — HOSPITAL ENCOUNTER (OUTPATIENT)
Dept: BEHAVIORAL HEALTH | Facility: CLINIC | Age: 33
Discharge: HOME | End: 2023-06-05
Attending: FAMILY MEDICINE | Admitting: FAMILY MEDICINE
Payer: COMMERCIAL

## 2023-06-05 PROCEDURE — 90791 PSYCH DIAGNOSTIC EVALUATION: CPT | Performed by: COUNSELOR

## 2023-06-05 ASSESSMENT — COLUMBIA-SUICIDE SEVERITY RATING SCALE - C-SSRS
ATTEMPT LIFETIME: NO
1. HAVE YOU WISHED YOU WERE DEAD OR WISHED YOU COULD GO TO SLEEP AND NOT WAKE UP?: YES
TOTAL  NUMBER OF INTERRUPTED ATTEMPTS LIFETIME: NO
REASONS FOR IDEATION LIFETIME: COMPLETELY TO END OR STOP THE PAIN (YOU COULDN'T GO ON LIVING WITH THE PAIN OR HOW YOU WERE FEELING)
1. IN YOUR LIFETIME, HAVE YOU WISHED YOU WERE DEAD OR WISHED YOU COULD GO TO SLEEP AND NOT WAKE UP?: 10 YEARS AGO
6. HAVE YOU EVER DONE ANYTHING, STARTED TO DO ANYTHING, OR PREPARED TO DO ANYTHING TO END YOUR LIFE?: NO
TOTAL  NUMBER OF ABORTED OR SELF INTERRUPTED ATTEMPTS LIFETIME: NO
1. IN THE PAST MONTH, HAVE YOU WISHED YOU WERE DEAD OR WISHED YOU COULD GO TO SLEEP AND NOT WAKE UP?: NO
2. HAVE YOU ACTUALLY HAD ANY THOUGHTS OF KILLING YOURSELF?: NO

## 2023-06-05 ASSESSMENT — PATIENT HEALTH QUESTIONNAIRE - PHQ9
SUM OF ALL RESPONSES TO PHQ QUESTIONS 1-9: 7
10. IF YOU CHECKED OFF ANY PROBLEMS, HOW DIFFICULT HAVE THESE PROBLEMS MADE IT FOR YOU TO DO YOUR WORK, TAKE CARE OF THINGS AT HOME, OR GET ALONG WITH OTHER PEOPLE: SOMEWHAT DIFFICULT
SUM OF ALL RESPONSES TO PHQ QUESTIONS 1-9: 7

## 2023-06-05 ASSESSMENT — ANXIETY QUESTIONNAIRES
IF YOU CHECKED OFF ANY PROBLEMS ON THIS QUESTIONNAIRE, HOW DIFFICULT HAVE THESE PROBLEMS MADE IT FOR YOU TO DO YOUR WORK, TAKE CARE OF THINGS AT HOME, OR GET ALONG WITH OTHER PEOPLE: SOMEWHAT DIFFICULT
GAD7 TOTAL SCORE: 10
2. NOT BEING ABLE TO STOP OR CONTROL WORRYING: MORE THAN HALF THE DAYS
5. BEING SO RESTLESS THAT IT IS HARD TO SIT STILL: SEVERAL DAYS
GAD7 TOTAL SCORE: 10
7. FEELING AFRAID AS IF SOMETHING AWFUL MIGHT HAPPEN: SEVERAL DAYS
4. TROUBLE RELAXING: SEVERAL DAYS
7. FEELING AFRAID AS IF SOMETHING AWFUL MIGHT HAPPEN: SEVERAL DAYS
8. IF YOU CHECKED OFF ANY PROBLEMS, HOW DIFFICULT HAVE THESE MADE IT FOR YOU TO DO YOUR WORK, TAKE CARE OF THINGS AT HOME, OR GET ALONG WITH OTHER PEOPLE?: SOMEWHAT DIFFICULT
6. BECOMING EASILY ANNOYED OR IRRITABLE: SEVERAL DAYS
1. FEELING NERVOUS, ANXIOUS, OR ON EDGE: MORE THAN HALF THE DAYS
3. WORRYING TOO MUCH ABOUT DIFFERENT THINGS: MORE THAN HALF THE DAYS
GAD7 TOTAL SCORE: 10
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
## 2023-07-03 NOTE — TELEPHONE ENCOUNTER
Routing refill request to provider for review/approval because:  RX Protocol Failed.  Please review refill.  Thank you.  Sherri Patterson RN         VSS, all questions answered. Denies recent fever or illness. Parents state ready for procedure.

## 2023-07-27 ENCOUNTER — TRANSFERRED RECORDS (OUTPATIENT)
Dept: HEALTH INFORMATION MANAGEMENT | Facility: CLINIC | Age: 33
End: 2023-07-27

## 2023-08-07 DIAGNOSIS — F41.9 ANXIETY: ICD-10-CM

## 2023-08-07 RX ORDER — SERTRALINE HYDROCHLORIDE 100 MG/1
100 TABLET, FILM COATED ORAL DAILY
Qty: 90 TABLET | Refills: 90 | Status: SHIPPED | OUTPATIENT
Start: 2023-08-07 | End: 2024-04-09

## 2023-11-21 ASSESSMENT — ANXIETY QUESTIONNAIRES
IF YOU CHECKED OFF ANY PROBLEMS ON THIS QUESTIONNAIRE, HOW DIFFICULT HAVE THESE PROBLEMS MADE IT FOR YOU TO DO YOUR WORK, TAKE CARE OF THINGS AT HOME, OR GET ALONG WITH OTHER PEOPLE: SOMEWHAT DIFFICULT
6. BECOMING EASILY ANNOYED OR IRRITABLE: SEVERAL DAYS
3. WORRYING TOO MUCH ABOUT DIFFERENT THINGS: SEVERAL DAYS
1. FEELING NERVOUS, ANXIOUS, OR ON EDGE: MORE THAN HALF THE DAYS
4. TROUBLE RELAXING: MORE THAN HALF THE DAYS
GAD7 TOTAL SCORE: 9
2. NOT BEING ABLE TO STOP OR CONTROL WORRYING: SEVERAL DAYS
5. BEING SO RESTLESS THAT IT IS HARD TO SIT STILL: SEVERAL DAYS
7. FEELING AFRAID AS IF SOMETHING AWFUL MIGHT HAPPEN: SEVERAL DAYS
GAD7 TOTAL SCORE: 9

## 2023-11-21 ASSESSMENT — PATIENT HEALTH QUESTIONNAIRE - PHQ9
10. IF YOU CHECKED OFF ANY PROBLEMS, HOW DIFFICULT HAVE THESE PROBLEMS MADE IT FOR YOU TO DO YOUR WORK, TAKE CARE OF THINGS AT HOME, OR GET ALONG WITH OTHER PEOPLE: SOMEWHAT DIFFICULT
SUM OF ALL RESPONSES TO PHQ QUESTIONS 1-9: 9
SUM OF ALL RESPONSES TO PHQ QUESTIONS 1-9: 9

## 2023-11-22 ENCOUNTER — VIRTUAL VISIT (OUTPATIENT)
Dept: PSYCHOLOGY | Facility: CLINIC | Age: 33
End: 2023-11-22
Payer: COMMERCIAL

## 2023-11-22 DIAGNOSIS — F41.9 ANXIETY: ICD-10-CM

## 2023-11-22 PROCEDURE — 90837 PSYTX W PT 60 MINUTES: CPT | Mod: VID | Performed by: PSYCHOLOGIST
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
## 2024-01-08 DIAGNOSIS — Z30.41 ORAL CONTRACEPTIVE PILL SURVEILLANCE: ICD-10-CM

## 2024-01-08 RX ORDER — NORETHINDRONE AND ETHINYL ESTRADIOL 1 MG-35MCG
1 KIT ORAL DAILY
Qty: 84 TABLET | Refills: 0 | Status: SHIPPED | OUTPATIENT
Start: 2024-01-08 | End: 2024-04-05

## 2024-01-30 ENCOUNTER — DOCUMENTATION ONLY (OUTPATIENT)
Dept: PSYCHOLOGY | Facility: CLINIC | Age: 34
End: 2024-01-30
Payer: COMMERCIAL

## 2024-01-30 NOTE — PROGRESS NOTES
CNS Vital Signs Neurocognitive Battery  The CNS Vital Signs Neurocognitive Battery is a remotely-administered assessment comprised of seven core subtests to individually measure the patient's verbal memory, visual memory, motor speed, psychomotor speed, reaction time, focus, ability to sustain attention and ability to adapt to changing rules and tasks.      Above average domain scores indicate a standard score (SS) greater than 109 or a Percentile Rank (NV) greater than 74, indicating a high functioning test subject. Average is a SS  or NV 25-74, indicating normal function. Low Average is a SS 80-89 or NV 9-24 indicating a slight deficit or impairment. Below Average is a SS 70-79 or NV 2-8, indicating a moderate level of deficit or impairment. Very Low is a SS less than 70 or a NV less than 2, indicating a deficit and impairment.  Validity Indicator denotes a guideline for representing the possibility of an invalid test or domain score, and can be influenced by patient understanding, effort, or other conditions.    The patient's results are detailed below:    Domain Standard Score Percentile Description Validity   Neurocognitive Index 92 30 Average YES   Composite Memory Measure 99 47 Average YES   Verbal Memory 96 40 Average YES   Visual Memory 103 58 Average YES   Psychomotor Speed 105 63 Average YES   Reaction Time 94 34 Average YES   *Complex Attention 76 5 Low YES   *Cognitive Flexibility 85 16 Low Average YES   *Processing Speed 121 92 Above YES   *Executive Function 88 21 Low Average YES   *Simple Attention 91 27 Average YES   Motor Speed 95 37 Average YES   *Scales that are the most indicative of ADHD    Neurocognitive Index (NCI): Measures an average score derived from the domain scores or a general assessment of the overall neurocognitive status of the patient. The patient's NCI score is 92, with a percentile of 30, and falls within the Average range.    Composite Memory: Measures how well subject  can recognize, remember, and retrieve words and geometric figures, and is comprised of the Visual and Verbal Memory domains. The patient's Composite Memory score is 99, with a percentile of 47, and falls within the Average range.    Verbal Memory: Measures how well subject can recognize, remember, and retrieve words. The patient's Verbal Memory score is 96, with a percentile of 40, and falls within the Average range.    Visual Memory: Measures how well subject can recognize, remember and retrieve geometric figures. The patient's Visual Memory score is 103, with a percentile of 58, and falls within the Average range.    Psychomotor Speed: Measures how well a subject perceives, attends, responds to complex visual-perceptual information and performs simple fine motor coordination, and is comprised of the Motor Speed and Processing Speed indexes. The patient's Psychomotor Speed score is 105, with a percentile of 63, and falls within the Average range.    Reaction Time: Measures how quickly the subject can react, in milliseconds, to a simple and increasingly complex direction set. The patient's Reaction Time score is 94, with a percentile of 34, and falls within the Average range.    Complex Attention: Measures the ability to track and respond to a variety of stimuli over lengthy periods of time and/or perform complex mental tasks requiring vigilance quickly and accurately. The patient's Complex Attention score is 76, with a percentile of 5, and falls within the Low range.    Cognitive Flexibility: Measures how well subject is able to adapt to rapidly changing and increasingly complex set of directions and/or to manipulate the information. The patient's Cognitive Flexibility score is 85, with a percentile of 16, and falls within the Low Average range.    Processing Speed: Measures how well a subject recognizes and processes information i.e., perceiving, attending/responding to incoming information, motor speed, fine  motor coordination, and visual-perceptual ability. The patient's Processing Speed score is 121, with a percentile of 92, and falls within the Above range.    Executive Function: Measures how well a subject recognizes rules, categories, and manages or navigates rapid decision making. The patient's Executive Function score is 88, with a percentile of 21, and falls within the Average range.    Simple Attention: Measures the ability to track and respond to a single defined stimulus over lengthy periods of time while performing vigilance and response inhibition quickly and accurately to a simple task. The patient's Simple Attention score is 91, with a percentile of 27, and falls within the Average range.    Motor Speed: Measure: Ability to perform simple movements to produce and satisfy an intention towards a manual action and goal. The patient's Motor Speed score is 95, with a percentile of 37, and falls within the Average range.    Next Step: Interpret MMPI.     Lisandra Swift PsyD LP 1/30/2024

## 2024-02-05 ENCOUNTER — DOCUMENTATION ONLY (OUTPATIENT)
Dept: PSYCHOLOGY | Facility: CLINIC | Age: 34
End: 2024-02-05

## 2024-02-05 ENCOUNTER — DOCUMENTATION ONLY (OUTPATIENT)
Dept: PSYCHOLOGY | Facility: CLINIC | Age: 34
End: 2024-02-05
Payer: COMMERCIAL

## 2024-02-05 NOTE — PROGRESS NOTES
"[facility]  ADHD Evaluation         Patient: Martine Lewis   YOB: 1990  MRN: [id number]  Date(s) of assessment:  Damien self-report and collateral measures scored and interpreted 2/5/2024         Assessment tools:      Damien Adult ADHD Rating Scale-IV: Self and Other Reports (BAARS-IV), Damien Functional Impairment Scale: Self and Other Reports (BFIS), and Damien Deficits in Executive Functioning Scale: Self and Other Reports (BDEFS)    ADHD Assessment tools have been completed remotely as in person observations were not possible.    Assessment Results:        Damien Adult ADHD Rating Scale-IV: Self and Other Reports (BAARS-IV)  The BAARS-IV assesses for symptoms of ADHD that are experienced in one's daily life. This assessment measure includes self and collateral rating scales designed to provide information regarding current and childhood symptoms of ADHD including inattention, hyperactivity, and impulsivity. Self-report scores are reported as percentiles. Scores at the 76th-83rd percentile are considered marginal, scores at the 84th-92nd percentile are considered borderline, scores at the 93rd-95th percentile are considered mild, scores at the 96th-98th percentile are considered moderate, and those at the 99th percentile are considered severe. Collateral or \"other\" rating scales are reported as number of symptoms observed in comparison to those reported by the client. Norms and percentile scores are not available for collateral reports.     Current Symptoms Scale--Self Report:   Client completed the self-report inventory of current symptoms. The results indicate that the client's Total ADHD Score was 52 which places her in the 98th percentile for overall ADHD symptoms. In addition, the client endorsed 6/9 (97th percentile) Inattention symptoms, 6/9 (98th percentile) Hyperactivity-Impulsivity symptoms, and 6/9 (96th percentile) Sluggish Cognitive Tempo symptoms. Client " indicated that the reported symptoms have resulted in impaired functioning in school, home, work, and social relationships. Overall, the results suggest the client is experiencing Moderate ADHD symptoms.     Current Symptoms Scale--Other Report:  Client's  completed the collateral report inventory of current symptoms. Based on the collateral contact's observation of symptoms, the client demonstrates 4/9 Inattention symptoms, 3/5 Hyperactivity, 0/4 Impulsivity symptoms, and 5/9 Sluggish Cognitive Tempo symptoms. The client's Total ADHD Score was 40. The collateral contact indicated the client demonstrates impaired functioning in home and social relationships. The collateral- and self-report scores are significantly different for Impulsivity and are within normal limits for all other areas.    Childhood Symptoms Scale--Self-Report:  Client completed the self-report inventory of childhood symptoms. The results indicate that the client's Total ADHD Score was 45 which places her in the 92nd percentile for overall ADHD symptoms in childhood. In addition, the client endorsed 5/9 (92nd percentile) Inattention symptoms and  3/9 (87th percentile) Hyperactivity-Impulsivity symptoms. Client indicated that the reported symptoms resulted in impaired functioning in school and home. Overall, the results suggest the client experienced  Borderline symptoms of ADHD as a child.       Childhood Symptoms Scale--Other Report:  Client's mother completed the collateral report inventory of childhood symptoms. Based on the collateral contact's recollection of client's childhood symptoms, the client demonstrated 1/9 Inattention symptoms and 0/9 Hyperactivity-Impulsivity symptoms. The client's Total ADHD Score was 29. The collateral contact indicated the client demonstrates impaired functioning in  no areas. The collateral- and self-report scores are significantly different.                               Damien Functional Impairment  "Scale: Self and Other Reports (BFIS)  The BFIS is used to assess an individuals' psychosocial impairment in major life/daily activities that may be due to a mental health disorder. This assessment measure includes self and collateral rating scales. Self-report scores are reported as percentiles. Scores at the 76th-83rd percentile are considered marginal, scores at the 84th-92nd percentile are considered borderline, scores at the 93rd-95th percentile are considered mild, scores at the 96th-98th percentile are considered moderate, and those at the 99th percentile are considered severe.Collateral or \"other\" rating scales are reported as number of symptoms observed in comparison to those reported by the client. Norms and percentile scores are not available for collateral reports.     Results indicate the client identified impairment (scores at or greater than 93rd percentile) in the following areas:  no areas.  The client's Mean Impairment Score was 2.87 (51st-75th percentile) indicating the client is reporting Marginal impairment in functioning across domains. Client's  completed the collateral rating scale, which indicated discrepant results. The collateral contact's scores were generally higher than the client's report.     Damien Deficits in Executive Functioning Scale (BDEFS)  The BDEFS is a measure used for evaluating dimensions of adult executive functioning in daily life.This assessment measure includes self and collateral rating scales. Self-report scores are reported as percentiles. Scores at the 76th-83rd percentile are considered marginal, scores at the 84th-92nd percentile are considered borderline, scores at the 93rd-95th percentile are considered mild, scores at the 96th-98th percentile are considered moderate, and those at the 99th percentile are considered severe.Collateral or \"other\" rating scales are reported as number of symptoms observed in comparison to those reported by the client. Norms " and percentile scores are not available for collateral reports.     Results indicate the client's Total Executive Functioning Score was 210  (95th percentile). The ADHD-Executive Functioning Index score was 27 (94th percentile). These scores suggest the client has Mild deficits in executive functioning. These deficits may be due to ADHD. Results indicate the client identified significant deficits in the following areas: self-management to time 98th  and self-organization/problem-solving 93rd . Client's  completed the collateral rating scale, which indicated similar results. The collateral contact's scores were generally lower than the client's report.    Next Step: Meet with patient to gather ADHD social history.       Lisandra Swift Psy.D, LP    2/5/2024

## 2024-02-05 NOTE — PROGRESS NOTES
Summary of MMPI-3 Results      Patient completed the [place] Multiphasic Personality Inventory-3 (MMPI-3), a self-report personality inventory, as a part of the psychological assessment for ruling out Attention Deficit disorders.  Validity scales indicate that the Patient responded in an open and consistent manner, resulting in a valid profile. However, Patient responded similarly to people who responded in a manner that is indicative of people who have a possible reading or language impairment, a cognitive impairment, errors in recording responses, and/or carelessness. Her responses yielded a profile that is consistent with people who report cognitive difficulties, attention and concentration problems, indecisiveness, being inefficacious, anxiety, stress, trauma, and being self-critical.      Patient responded similarly to people who report a diffuse pattern of cognitive difficulties. Moreover, they tend to report complaints of memory problems. They also report experiencing a low tolerance for frustration. They do not cope well with stress. Furthermore, they tend to report difficulties with attention and/or concentration. Patient responded similarly to people who report being very indecisive and inefficacious. They also tend to believe that they are incapable of making decisions and dealing effectively with crisis situations. Moreover, they tend to have difficulties when dealing with small, inconsequential matters. People like this tend to report subjective incompetence and shame. They also tend to lack perseverance and self-reliance.    Patent responded similarly to people who report multiple problems involving stress and feeling nervous. People like this tend to complain about stress. They also feel incapable of controlling their anxiety level. Patient responded similarly to people who report excessive worry, including worries about misfortune and finances, as well as preoccupation with disappointments. People  like this tend to worry excessively and ruminate.  Patient responded similarly to people who report multiple anxiety-related experiences, including generalized anxiety, re-experiencing anxiety, and/or panic. People like this tend to report significant anxiety and anxiety related problems. Moreover, the report intrusive ideation, sleep difficulties, nightmares, possible post-traumatic distress, and dissociative experiences. Patient responded similarly to people who report experiencing an elevated level of negative emotionality. They report experiencing various negative emotions, including anxiety, insecurity, and worry. Moreover, they tend to be inhibited behaviorally because of negative emotions. They are self-critical and guilt-prone. They also experience intrusive ideation.      Results of testing were consistent with her report upon direct interview.     Next step: Score Damien's questionnaires.     Lisandra Swift PsyD LP 2/5/2024

## 2024-02-13 ENCOUNTER — VIRTUAL VISIT (OUTPATIENT)
Dept: PSYCHOLOGY | Facility: CLINIC | Age: 34
End: 2024-02-13
Payer: COMMERCIAL

## 2024-02-13 DIAGNOSIS — Z13.39 ATTENTION DEFICIT HYPERACTIVITY DISORDER (ADHD) EVALUATION: ICD-10-CM

## 2024-02-13 DIAGNOSIS — F33.0 MAJOR DEPRESSIVE DISORDER, RECURRENT EPISODE, MILD WITH ANXIOUS DISTRESS (H): ICD-10-CM

## 2024-02-13 DIAGNOSIS — F41.1 GENERALIZED ANXIETY DISORDER: Primary | ICD-10-CM

## 2024-02-13 PROCEDURE — 90834 PSYTX W PT 45 MINUTES: CPT | Mod: 95 | Performed by: PSYCHOLOGIST

## 2024-02-13 ASSESSMENT — ANXIETY QUESTIONNAIRES
2. NOT BEING ABLE TO STOP OR CONTROL WORRYING: NEARLY EVERY DAY
8. IF YOU CHECKED OFF ANY PROBLEMS, HOW DIFFICULT HAVE THESE MADE IT FOR YOU TO DO YOUR WORK, TAKE CARE OF THINGS AT HOME, OR GET ALONG WITH OTHER PEOPLE?: SOMEWHAT DIFFICULT
3. WORRYING TOO MUCH ABOUT DIFFERENT THINGS: MORE THAN HALF THE DAYS
6. BECOMING EASILY ANNOYED OR IRRITABLE: SEVERAL DAYS
GAD7 TOTAL SCORE: 12
GAD7 TOTAL SCORE: 12
5. BEING SO RESTLESS THAT IT IS HARD TO SIT STILL: SEVERAL DAYS
7. FEELING AFRAID AS IF SOMETHING AWFUL MIGHT HAPPEN: SEVERAL DAYS
IF YOU CHECKED OFF ANY PROBLEMS ON THIS QUESTIONNAIRE, HOW DIFFICULT HAVE THESE PROBLEMS MADE IT FOR YOU TO DO YOUR WORK, TAKE CARE OF THINGS AT HOME, OR GET ALONG WITH OTHER PEOPLE: SOMEWHAT DIFFICULT
7. FEELING AFRAID AS IF SOMETHING AWFUL MIGHT HAPPEN: SEVERAL DAYS
4. TROUBLE RELAXING: MORE THAN HALF THE DAYS
1. FEELING NERVOUS, ANXIOUS, OR ON EDGE: MORE THAN HALF THE DAYS
GAD7 TOTAL SCORE: 12

## 2024-02-13 ASSESSMENT — COLUMBIA-SUICIDE SEVERITY RATING SCALE - C-SSRS
2. HAVE YOU ACTUALLY HAD ANY THOUGHTS OF KILLING YOURSELF?: YES
TOTAL  NUMBER OF INTERRUPTED ATTEMPTS LIFETIME: NO
1. HAVE YOU WISHED YOU WERE DEAD OR WISHED YOU COULD GO TO SLEEP AND NOT WAKE UP?: YES
ATTEMPT LIFETIME: NO
2. HAVE YOU ACTUALLY HAD ANY THOUGHTS OF KILLING YOURSELF?: VAGUE THOUGHTS
4. HAVE YOU HAD THESE THOUGHTS AND HAD SOME INTENTION OF ACTING ON THEM?: NO
5. HAVE YOU STARTED TO WORK OUT OR WORKED OUT THE DETAILS OF HOW TO KILL YOURSELF? DO YOU INTEND TO CARRY OUT THIS PLAN?: NO
6. HAVE YOU EVER DONE ANYTHING, STARTED TO DO ANYTHING, OR PREPARED TO DO ANYTHING TO END YOUR LIFE?: NO
3. HAVE YOU BEEN THINKING ABOUT HOW YOU MIGHT KILL YOURSELF?: YES
TOTAL  NUMBER OF ABORTED OR SELF INTERRUPTED ATTEMPTS LIFETIME: NO
2. HAVE YOU ACTUALLY HAD ANY THOUGHTS OF KILLING YOURSELF?: NO
1. IN THE PAST MONTH, HAVE YOU WISHED YOU WERE DEAD OR WISHED YOU COULD GO TO SLEEP AND NOT WAKE UP?: NO
REASONS FOR IDEATION LIFETIME: EQUALLY TO GET ATTENTION, REVENGE, OR A REACTION FROM OTHERS AND TO END/STOP THE PAIN
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
## 2024-03-27 ENCOUNTER — FCC EXTENDED DOCUMENTATION (OUTPATIENT)
Dept: PSYCHOLOGY | Facility: CLINIC | Age: 34
End: 2024-03-27
Payer: COMMERCIAL

## 2024-03-27 ENCOUNTER — PSYCHE (OUTPATIENT)
Dept: PSYCHOLOGY | Facility: CLINIC | Age: 34
End: 2024-03-27
Payer: COMMERCIAL

## 2024-03-27 ENCOUNTER — DOCUMENTATION ONLY (OUTPATIENT)
Dept: PSYCHOLOGY | Facility: CLINIC | Age: 34
End: 2024-03-27
Payer: COMMERCIAL

## 2024-03-27 DIAGNOSIS — Z13.39 ATTENTION DEFICIT HYPERACTIVITY DISORDER (ADHD) EVALUATION: ICD-10-CM

## 2024-03-27 DIAGNOSIS — F41.1 GENERALIZED ANXIETY DISORDER: Primary | ICD-10-CM

## 2024-03-27 DIAGNOSIS — F33.0 MAJOR DEPRESSIVE DISORDER, RECURRENT EPISODE, MILD WITH ANXIOUS DISTRESS (H): ICD-10-CM

## 2024-03-27 NOTE — PROGRESS NOTES
Located within [facility]  ADHD Evaluation         Patient: Martine Lewis   YOB: 1970  MRN: [id number]  Date(s) of assessment:  WAIS administration  3/27/2024         Assessment tools:      Wechsler Adult Intelligence Scale--Fourth Edition (WAIS-IV)        Assessment Results:    WAIS-IV Behavioral Observations:  Patient arrived early and appeared motivated to complete the test. She appeared to exhibit sound effort throughout the testing process. She asked for three questions to be repeated on the Arithmetic subtest and she used her fingers to count. On several subtests, Patient made jokes when a problem became difficulty for her.        Wechsler Adult Intelligence Scale--Fourth Edition (WAIS-IV)  Client s intellectual functioning was assessed using the WAIS-IV. This measure provides a Full Scale Score, as well as several index scores measuring specific areas of cognitive ability. Index scores are reported using standard scores which have a mean of 100 and a standard deviation of 15. Subtest scores are reported using scaled scores that have a mean of 10 and a standard deviation of 2. Client s scores are reported at the 95 percent confidence interval, which indicates that there is a 95 percent chance that his true score falls within the stated range.  Results indicate that the FSIQ, a measure of overall intelligence, is not the best descriptor of the client s functioning due to significant differences amongst the rating scales. The General Ability Index (GAI), another measure of overall intelligence that factors out the effects of working memory and processing speed, was valid and interpretable. Client's score on the GAI was in the High Average range (95% chance of 110-119; 84th percentile).   Client s score on the Verbal Comprehension Index (VCI), a measure of verbal concept formation, verbal reasoning, and acquired knowledge, was in the High Average range (95% chance 108-119; 82nd percentile).  Client s score on the Perceptual Reasoning Index (AUTUMN), a measure of perceptual and fluid reasoning, spatial processing, and visual-motor integration, was in the High Average range (95% chance 106-119; 81st percentile). Client scored in the Low Average range (95% chance 80-94;  18th percentile) on the Working Memory Index (WMI), a measure of ability to retain information in memory, perform some operation or manipulation, and produce a result. Client s score on the Processing Speed Index (PSI), a measure of short-term visual memory, attention, and visual-motor coordination was in the High Average range (95% chance 102-118; 77th percentile).     Summary of WAIS-IV Index Scores   Index  Score Percentile Rank Confidence  Interval* Qualitative Description   Verbal Comprehension Index (VCI) 114 82 108-119 High Average   Perceptual Reasoning Index (AUTUMN) 113 81 106-119 High Average   Working Memory Index (WMI) 86 18 80-94 Low Average   Processing Speed Index (PSI) 111 77 102-118 High Average   Full Scale IQ (FSIQ) 109 73 105-113 Average   General Ability Index (GAI) 115 84 110-119 High Average     Summary of WAIS-IV Scaled Subtest Scores  Verbal Comprehension Perceptual Reasoning   Similarities 15 Block Design 13   Vocabulary 11 Matrix Reasoning 14   Information 12 Visual Puzzles 10   Working Memory Processing Speed   Digit Span 7 Symbol Search 13   Arithmetic 8 Coding 11   *Confidence intervals at 95th percentile    Next Step: Schedule feedback appointment    Lisandra Swift Psy.D, CHIP    3/27/2024

## 2024-03-27 NOTE — PROGRESS NOTES
[facility]                                     Progress Note    Patient Name: Martine Lewis  Date: 3/27/2024          Service Type: Individual for ADHD Assessment      Session Start Time: 11:00AM  Session End Time: 12:09PM     Session Length: 69 minutes    Session #: ADHD WAIS    Attendees: Client attended alone    Service Modality:  In-person at the [facility]        DATA  Interactive Complexity: No  Crisis: No        Progress Since Last Session (Related to Symptoms / Goals / Homework):   Symptoms: No change     Homework: Achieved / completed to satisfaction       Episode of Care Goals: Satisfactory progress - ACTION (Actively working towards change); Intervened by reinforcing change plan / affirming steps taken     Current / Ongoing Stressors and Concerns:   Work Related Stress     Treatment Objective(s) Addressed in This Session:   Complete IQ testing as part of ADHD assessment       Patient arrived early and appeared motivated to complete the test. She appeared to exhibit sound effort throughout the testing process. She asked for three questions to be repeated on the Arithmetic subtest and she used her fingers to count. On several subtests, Patient made jokes when a problem became difficulty for her.      Intervention:   WAIS-IV was administered. Measures were scored and interpreted, and Patient plans to return for feedback appointment.     Assessments completed prior to visit:  The following assessments were completed by patient for this visit:  PHQ9:       10/10/2019     4:13 PM 12/20/2019     3:03 PM 12/10/2020     4:37 PM 6/5/2023     8:21 AM 11/21/2023     6:05 PM 2/13/2024     8:40 AM 3/27/2024     8:01 AM   PHQ-9 SCORE   PHQ-9 Total Score MyChart    7 (Mild depression) 9 (Mild depression) 10 (Moderate depression) 6 (Mild depression)   PHQ-9 Total Score 11 4 4 7 9 10 6     GAD7:       2/28/2023     3:10 PM 5/22/2023    10:42 PM 6/5/2023     8:31 AM  11/21/2023     6:06 PM 2/13/2024     8:37 AM 2/13/2024     8:40 AM 3/27/2024     9:50 AM   MICHEAL-7 SCORE   Total Score  11 (moderate anxiety) 10 (moderate anxiety) 9 (mild anxiety) 12 (moderate anxiety) 12 (moderate anxiety) 11 (moderate anxiety)   Total Score 10 11 10 9 12 12 11         ASSESSMENT: Current Emotional / Mental Status (status of significant symptoms):   Risk status (Self / Other harm or suicidal ideation)   Patient denies current fears or concerns for personal safety.   Patient denies current or recent suicidal ideation or behaviors.   Patient denies current or recent homicidal ideation or behaviors.   Patient denies current or recent self injurious behavior or ideation.   Patient denies other safety concerns.   Patient reports there has been no change in risk factors since their last session.     Patient reports there has been no change in protective factors since their last session.     Recommended that patient call 911 or go to the local ED should there be a change in any of these risk factors.     Appearance:   Appropriate      Eye Contact:   Good    Psychomotor Behavior: Normal  Used fingers to help her count   Attitude:   Cooperative  Interested   Orientation:   All   Speech    Rate / Production: Normal     Volume:  Normal    Mood:    Normal   Affect:    Appropriate     Thought Content:  Clear    Thought Form:  Coherent  Logical    Insight:    Good      Medication Review:   No changes to current psychiatric medication(s)      Medication Compliance:   Yes      Changes in Health Issues:   None reported      Chemical Use Review:   Substance Use: Chemical use reviewed, no active concerns identified      Tobacco Use: No current tobacco use.      Diagnosis:  296.31 (F33.0) Major Depressive Disorder, Recurrent Episode, Mild With anxious distress  300.02 (F41.1) Generalized Anxiety Disorder.    Collateral Reports Completed:   Not Applicable    PLAN: (Patient Tasks / Therapist Tasks / Other)  Plan to score  and interpret the WAIS-IV and then contact patient to schedule the feedback appointment.         Lisandra Swift Psy.D, LP   3/27/2024

## 2024-03-27 NOTE — PROGRESS NOTES
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      Generalized Anxiety Disorder  A. Excessive anxiety and worry about a number of events or activities (such as work or school performance).   B. The person finds it difficult to control the worry.  C. Select 3 or more symptoms (required for diagnosis). Only one item is required in children.   - Restlessness or feeling keyed up or on edge.    - Being easily fatigued.    - Difficulty concentrating or mind going blank.    - Irritability.    - Muscle tension.    - Sleep disturbance (difficulty falling or staying  asleep, or restless unsatisfying sleep).   D. The focus of the anxiety and worry is not confined to features of an Axis I disorder.  E. The anxiety, worry, or physical symptoms cause clinically significant distress or impairment in social, occupational, or other important areas of functioning.   F. The disturbance is not due to the direct physiological effects of a substance (e.g., a drug of abuse, a medication) or a general medical condition (e.g., hyperthyroidism) and does not occur exclusively during a Mood Disorder, a Psychotic Disorder, or a Pervasive Developmental Disorder.    - The aformentioned symptoms began 15 year(s) ago and occurs 7 days per week and is experienced as moderate. Major Depressive Disorder  CRITERIA (A-C) REPRESENT A MAJOR DEPRESSIVE EPISODE - SELECT THESE CRITERIA  A) Recurrent episode(s) - symptoms have been present during the same 2-week period and represent a change from previous functioning 5 or more symptoms (required for diagnosis)   - Diminished interest or pleasure in all, or almost all, activities.    - Decreased sleep.    - Fatigue or loss of energy.    - Feelings of worthlessness or inappropriate and excessive guilt.    - Diminished ability to think or concentrate, or indecisiveness.   B) The symptoms cause clinically significant distress or impairment in social, occupational, or other important areas of functioning  C) The episode is not attributable to the physiological effects of a substance or to another medical condition  D) The occurence of major depressive episode is not better explained by other thought / psychotic disorders  E) There has never been a manic episode or hypomanic episode      Post-traumatic Stress Disorder Diagnostic Criteria:      Exposure to actual or threatened death, serious injury, or sexual violence in one (or more) of the following ways:  Directly experiencing the traumatic event(s). Patient experienced a traumatic arrest experience that did not  result in physical injury or threatened death.     -Witnessing, in person, the event(s) as it occurred to others.  -Learning that the traumatic event(s) occurred to a close family member or close friend. In cases of actual or threatened death of a family member or friend, the event(s) must have been violent or accidental.  -Experiencing repeated or extreme exposure to aversive details of the traumatic event(s) (e.g., first responders collecting human remains; police officers repeatedly exposed to details of child abuse). Note: Criterion A4 does not apply to exposure through electronic media, television, movies, or pictures, unless this exposure is work related.      Presence of one (or more) of the following intrusion symptoms associated with the traumatic event(s), beginning after the traumatic event(s) occurred:  -Recurrent, involuntary, and intrusive distressing memories of the traumatic event(s). Note: In children older than 6 years, repetitive play may occur in which themes or aspects of the traumatic event(s) are expressed. YES  -Recurrent distressing dreams in which the content and/or affect of the dream are related to the traumatic event(s). Note: In children, there may be frightening dreams without recognizable content. YES   -Dissociative reactions (e.g., flashbacks) in which the individual feels or acts as if the traumatic event(s) were recurring. (Such reactions may occur on a continuum, with the most extreme expression being a complete loss of awareness of present surroundings.) Note: In children, trauma-specific reenactment may occur in play.   -Intense or prolonged psychological distress at exposure to internal or external cues that symbolize or resemble an aspect of the traumatic event(s). YES  -Marked physiological reactions to internal or external cues that symbolize or resemble an aspect of the traumatic event(s). YES       Persistent avoidance of stimuli associated with the traumatic event(s),  beginning after the traumatic event(s) occurred, as evidenced by one or both of the following:  -Avoidance of or efforts to avoid distressing memories, thoughts, or feelings about or closely associated with the traumatic event(s). YES  -Avoidance of or efforts to avoid external reminders (people, places, conversations, activities, objects, situations) that arouse distressing memories, thoughts, or feelings about or closely associated with the traumatic event(s). YES         Negative alterations in cognitions and mood associated with the traumatic event(s), beginning or worsening after the traumatic event(s) occurred, as evidenced by two (or more) of the following:  -Inability to remember an important aspect of the traumatic event(s) (typically due to dissociative amnesia, and not to other factors such as head injury, alcohol, or drugs). YES senior living  -Persistent and exaggerated negative beliefs or expectations about oneself, others, or the world (e.g.,  I am bad,   No one can be trusted,   The world is completely dangerous,   My whole nervous system is permanently ruined ). YES senior living  -Persistent, distorted cognitions about the cause or consequences of the traumatic event(s) that lead the individual to blame himself/herself or others.  -Persistent negative emotional state (e.g., fear, horror, anger, guilt, or shame). YES senior living  -Markedly diminished interest or participation in significant activities.   -Feelings of detachment or estrangement from others.   -Persistent inability to experience positive emotions (e.g., inability to experience happiness, satisfaction, or loving feelings).      Marked alterations in arousal and reactivity associated with the traumatic event(s), beginning or worsening after the traumatic event(s) occurred, as evidenced by two (or more) of the following:  -Irritable behavior and angry outbursts (with little or no provocation), typically expressed as verbal or physical aggression toward people or  objects.  -Reckless or self-destructive behavior.   -Hypervigilance. YES correction  -Exaggerated startle response.  -Problems with concentration. Unknown-could be due to ADHD or Anxiety  -Sleep disturbance (e.g., difficulty falling or staying asleep or restless sleep). YES correction    Duration of the disturbance (Criteria B, C, D and E) is more than 1 month.    The disturbance causes clinically significant distress or impairment in social, occupational, or other important areas of functioning. YES  The disturbance is not attributable to the physiological effects of a substance (e.g., medication, alcohol) or another medical condition.  Specify whether: YES    With dissociative symptoms: The individual s symptoms meet the criteria for posttraumatic stress disorder, and in addition, in response to the stressor, the individual experiences persistent or recurrent symptoms of either of the following:      Patient does not meet diagnostic criteria for PTSD due to the nature of the trauma. Hence, she is being diagnosed with Other Specified Trauma Disorder.       Attention Deficit Hyperactivity-Impulsivity Disorder Diagnostic Criteria:    Inattention: Six or more symptoms of inattention for children up to age 16, or five or more for adolescents 17 and older and adults; symptoms of inattention have been present for at least 6 months, and they are inappropriate for developmental level:    -Often fails to give close attention to details or makes careless mistakes in schoolwork, at work, or with other activities. YES  -Often has trouble holding attention on tasks or play activities.  -Often does not seem to listen when spoken to directly.  -Often does not follow through on instructions and fails to finish schoolwork, chores, or duties in the workplace (e.g., loses focus, side-tracked). YES  -Often has trouble organizing tasks and activities.  -Often avoids, dislikes, or is reluctant to do tasks that require mental effort over a long  period of time (such as schoolwork or homework). YES  -Often loses things necessary for tasks and activities (e.g. school materials, pencils, books, tools, wallets, keys, paperwork, eyeglasses, mobile telephones). YES  -Is often easily distracted YES  -Is often forgetful in daily activities. YES    Hyperactivity and Impulsivity: Six or more symptoms of hyperactivity-impulsivity for children up to age 16, or five or more for adolescents 17 and older and adults; symptoms of hyperactivity-impulsivity have been present for at least 6 months to an extent that is disruptive and inappropriate for the person s developmental level:    -Often fidgets with or taps hands or feet, or squirms in seat. YES  -Often leaves seat in situations when remaining seated is expected.  -Often runs about or climbs in situations where it is not appropriate (adolescents or adults may be limited to feeling restless).  -Often unable to play or take part in leisure activities quietly. YES  -Is often  on the go  acting as if  driven by a motor .   -Often talks excessively. YES  -Often blurts out an answer before a question has been completed. YES  -Often has trouble waiting his/her turn. YES  -Often interrupts or intrudes on others (e.g., butts into conversations or games) YES     In addition, the following conditions must be met:    -Several inattentive or hyperactive-impulsive symptoms were present before age 12 years. YES  -Several symptoms are present in two or more setting, (e.g., at home, school or work; with friends or relatives; in other activities). YES  -There is clear evidence that the symptoms interfere with, or reduce the quality of, social, school, or work functioning. YES  -The symptoms do not happen only during the course of schizophrenia or another psychotic disorder. The symptoms are not better explained by another mental disorder (e.g. Mood Disorder, Anxiety Disorder, Dissociative Disorder, or a Personality Disorder).  YES      Combined Presentation: if enough symptoms of both criteria inattention and hyperactivity-impulsivity were present for the past 6 months         ,  Results of testing were significant for inattention, hyperactivity/impulsivity, depression anxiety, and trauma. Rating scales suggested that the client is currently experiencing symptoms of inattention, hyperactivity/impulsivity that have been present to some extent since childhood. The client endorsed moderate overall symptoms of inattention and hyperactivity-impulsivity. The client reported that she has had attention issues since childhood. The client reported scores of marginal functional impairment and borderline executive functioning. This means that the client has likely made accommodations to help her function with her current abilities, thus she is only mildly impaired with her ability to function in her current roles. She reported scores of mild executive functioning deficits. She reported significant difficulty  with self-management to time and self-organization/problem-solving. The client and her collateral reporters reported similar scores on the rating scales for her current level of functioning; however, the childhood scores were discrepant. Intelligence testing suggested that the client is functioning in the overall High Average range.  Her scores on the working memory tasks were much lower than her other scores. These tasks rely heavily on attention. This pattern is common among people with attention deficits. Patient demonstrated lower scores on the Executive Functioning subtest and Average or Above on the Complex Attention, Cognitive Flexibility, Processing Speed, and Simple Attention CNS Vital Signs Neurocognitive Battery subtests. This pattern is atypical among people with inattention and hyperactivity-impulsivity symptoms. Personality testing was suggestive of the patient experiencing cognitive difficulties, attention and concentration  problems, indecisiveness, being inefficacious, anxiety, stress, trauma, and being self-critical. Additionally, the client is experiencing symptoms of anxiety and depression, which were reported on questionnaires. Moreover, she reported symptoms of trauma via clinical interview.     Despite having a lower BFIS score, the patient reported that the ADHD symptoms impact her life at work and home. Patient reported that she struggles with time management and getting to work on time. Patient reported that she is typically 5-10 minutes late to work. Patient reported that her  becomes annoyed, because the patient is often late when they are trying to get a place on time. Moreover, she reported that her inattention makes it difficult for her to complete chores, and that causes tension with her marriage.       Patient meets diagnostic criteria for ADHD as evidenced above per questionnaires, WAIS, CNS Vital Signs Neurocognitive Battery, MMPI and clinical interview of social history.     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   296.31 (F33.0) Major Depressive Disorder, Recurrent Episode, Mild With anxious distress  300.02 (F41.1) Generalized Anxiety Disorder.  Psychosocial & Contextual Factors: Family planning related stress     Recommendations:     1. Schedule an appointment with your physician to discuss a medication evaluation.  2. Access resources through websites, books, and articles such as those provided in the handout.  3. Consider working with an ADHD  or individual therapist to learn skills to  assist with symptom management, as well as ways to improve relationships,  etc that may have been impacted by your symptoms.  4. Consider attending workshops or support groups through [facility]  ([email or web address]).  5. Individual therapy is recommended for the treatment of anxiety, trauma and depression. Therapies focusing on identifying and challenging problematic thought processes can be beneficial.  You can schedule with an [facility] therapist by calling [phone]. Recommend patient continue working with her current therapist.  6. EMDR therapy is recommend for the treatment of trauma symptoms. You can go to this link to find an EMDR certified therapist: [email or web address]  7. Schedule a follow-up appointment with me in about six weeks to review symptoms, treatment involvement, and struggles and/or successes.        You May Find the Following ADHD Resources Helpful:    https://www.[employer]/  https://Ok[employer].com.[employer]/  https://MaxxAthlete.[employer]/  https://[employer]/about-2/  https://[employer]/kelly-jean/  https://www.[employer]/  https://forrest.[email or web address]/  https://www.[email or web address]/About-Mental-Illness/Mental-[email or web address]/ADHD  https://www.Pocket.[employer]/  https://[employer]/  https://www.[employer]iq.[employer]/  https://www.[employer].org/    Lisandra Swift Psy.D, CHIP    4/9/2024     Psychological Testing   Billing/Services Summary       Testing Evaluation Services Base: 99467  (1st 60 mins) Add-on: 51920  (each addtl 60 mins)   Record Review and Clarify Referral Question   1-30-24 2:20-2:45PM 25 minutes   Integration/Report Generation   1-30-24 1:45-1:55PM CNS (10)  2-5-24 3:30-4:00PM MMPI (30)  2-5-24 4:30-5:15PM [place] (45)  3-27-24 WAIS Report Writing 12:25-12:40PM (15)  3-27-24 1:20-1:50PM (30), 4:30-5:00PM (30) 4/9/2024  Integrated Report (60)   160 minutes   Post-Service Work   4/9/2024 4:03-4:30PM (27) 27 minutes   Total Time: 212 minutes (3 hours, 32 minutes)   Total Units: 1 3       Test Administration and Scoring Base: 92592  (1st 30 mins) Add-on: 82502  (each addtl 30 mins)   Test Administration (Face-to-Face)  3-27-24  WAIS Admin 11-12:09PM (69) 69 minutes   Scoring (Non-Face-to-Face)   3-27-24 WAIS Scoring 12:10-12:25PM (15) 15 minutes   Total Time: 84 minutes (1 hour, 24 minutes)   Total Units: 1 2       Diagnosis(es): (ICD-10) Attention-Deficit/Hyperactivity Disorder  314.01 (F90.2) Combined presentation;    Adjustment Disorders  309.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   296.31 (F33.0) Major Depressive Disorder, Recurrent Episode, Mild With anxious distress  300.02 (F41.1) Generalized Anxiety Disorder.

## 2024-04-02 SDOH — HEALTH STABILITY: PHYSICAL HEALTH: ON AVERAGE, HOW MANY DAYS PER WEEK DO YOU ENGAGE IN MODERATE TO STRENUOUS EXERCISE (LIKE A BRISK WALK)?: 3 DAYS

## 2024-04-02 SDOH — HEALTH STABILITY: PHYSICAL HEALTH: ON AVERAGE, HOW MANY MINUTES DO YOU ENGAGE IN EXERCISE AT THIS LEVEL?: 120 MIN

## 2024-04-02 ASSESSMENT — SOCIAL DETERMINANTS OF HEALTH (SDOH): HOW OFTEN DO YOU GET TOGETHER WITH FRIENDS OR RELATIVES?: ONCE A WEEK

## 2024-04-05 DIAGNOSIS — Z30.41 ORAL CONTRACEPTIVE PILL SURVEILLANCE: ICD-10-CM

## 2024-04-05 RX ORDER — NORETHINDRONE AND ETHINYL ESTRADIOL 1 MG-35MCG
1 KIT ORAL DAILY
Qty: 84 TABLET | Refills: 3 | Status: SHIPPED | OUTPATIENT
Start: 2024-04-05 | End: 2024-04-09

## 2024-04-09 ENCOUNTER — VIRTUAL VISIT (OUTPATIENT)
Dept: PSYCHOLOGY | Facility: CLINIC | Age: 34
End: 2024-04-09
Payer: COMMERCIAL

## 2024-04-09 ENCOUNTER — DOCUMENTATION ONLY (OUTPATIENT)
Dept: PSYCHOLOGY | Facility: CLINIC | Age: 34
End: 2024-04-09

## 2024-04-09 ENCOUNTER — OFFICE VISIT (OUTPATIENT)
Dept: FAMILY MEDICINE | Facility: CLINIC | Age: 34
End: 2024-04-09
Payer: COMMERCIAL

## 2024-04-09 VITALS
DIASTOLIC BLOOD PRESSURE: 81 MMHG | RESPIRATION RATE: 16 BRPM | OXYGEN SATURATION: 98 % | WEIGHT: 172.2 LBS | BODY MASS INDEX: 28.69 KG/M2 | HEART RATE: 62 BPM | SYSTOLIC BLOOD PRESSURE: 127 MMHG | HEIGHT: 65 IN | TEMPERATURE: 97.6 F

## 2024-04-09 DIAGNOSIS — F90.2 ATTENTION DEFICIT HYPERACTIVITY DISORDER (ADHD), COMBINED TYPE: Primary | ICD-10-CM

## 2024-04-09 DIAGNOSIS — F90.2 ATTENTION DEFICIT HYPERACTIVITY DISORDER (ADHD), COMBINED TYPE: ICD-10-CM

## 2024-04-09 DIAGNOSIS — F43.89 ADJUSTMENT REACTION TO CHRONIC STRESS: ICD-10-CM

## 2024-04-09 DIAGNOSIS — F33.0 MAJOR DEPRESSIVE DISORDER, RECURRENT EPISODE, MILD WITH ANXIOUS DISTRESS (H): ICD-10-CM

## 2024-04-09 DIAGNOSIS — F41.1 GENERALIZED ANXIETY DISORDER: ICD-10-CM

## 2024-04-09 DIAGNOSIS — Z13.6 CARDIOVASCULAR SCREENING; LDL GOAL LESS THAN 160: ICD-10-CM

## 2024-04-09 DIAGNOSIS — Z00.00 ROUTINE GENERAL MEDICAL EXAMINATION AT A HEALTH CARE FACILITY: Primary | ICD-10-CM

## 2024-04-09 DIAGNOSIS — Z30.41 ORAL CONTRACEPTIVE PILL SURVEILLANCE: ICD-10-CM

## 2024-04-09 DIAGNOSIS — F41.9 ANXIETY: ICD-10-CM

## 2024-04-09 PROCEDURE — 96137 PSYCL/NRPSYC TST PHY/QHP EA: CPT | Mod: 95 | Performed by: PSYCHOLOGIST

## 2024-04-09 PROCEDURE — 90480 ADMN SARSCOV2 VAC 1/ONLY CMP: CPT | Performed by: FAMILY MEDICINE

## 2024-04-09 PROCEDURE — 96136 PSYCL/NRPSYC TST PHY/QHP 1ST: CPT | Mod: 95 | Performed by: PSYCHOLOGIST

## 2024-04-09 PROCEDURE — 96131 PSYCL TST EVAL PHYS/QHP EA: CPT | Mod: 95 | Performed by: PSYCHOLOGIST

## 2024-04-09 PROCEDURE — 91320 SARSCV2 VAC 30MCG TRS-SUC IM: CPT | Performed by: FAMILY MEDICINE

## 2024-04-09 PROCEDURE — 96130 PSYCL TST EVAL PHYS/QHP 1ST: CPT | Mod: 95 | Performed by: PSYCHOLOGIST

## 2024-04-09 PROCEDURE — 99395 PREV VISIT EST AGE 18-39: CPT | Performed by: FAMILY MEDICINE

## 2024-04-09 PROCEDURE — 99213 OFFICE O/P EST LOW 20 MIN: CPT | Mod: 25 | Performed by: FAMILY MEDICINE

## 2024-04-09 PROCEDURE — 90837 PSYTX W PT 60 MINUTES: CPT | Mod: 95 | Performed by: PSYCHOLOGIST

## 2024-04-09 RX ORDER — METHYLPHENIDATE HYDROCHLORIDE 18 MG/1
18 TABLET ORAL EVERY MORNING
Qty: 30 TABLET | Refills: 0 | Status: SHIPPED | OUTPATIENT
Start: 2024-04-09 | End: 2024-05-07 | Stop reason: DRUGHIGH

## 2024-04-09 RX ORDER — SERTRALINE HYDROCHLORIDE 100 MG/1
150 TABLET, FILM COATED ORAL DAILY
Qty: 135 TABLET | Refills: 3 | Status: SHIPPED | OUTPATIENT
Start: 2024-04-09

## 2024-04-09 RX ORDER — NORETHINDRONE AND ETHINYL ESTRADIOL 1 MG-35MCG
1 KIT ORAL DAILY
Qty: 84 TABLET | Refills: 3 | Status: SHIPPED | OUTPATIENT
Start: 2024-04-09

## 2024-04-09 NOTE — PROGRESS NOTES
"Preventive Care Visit  [facility]  Niharika Stewart MD, Family Medicine  Apr 9, 2024      Assessment & Plan     (Z00.00) Routine general medical examination at a health care facility  (primary encounter diagnosis)  Comment: stable physical health  Plan: COVID-19 12+ (2023-24) (PFIZER), REVIEW OF         HEALTH MAINTENANCE PROTOCOL ORDERS, PRIMARY         CARE FOLLOW-UP SCHEDULING        Health Care Maintenance reviewed, measures not yet completed have been discussed.      (F90.2) Attention deficit hyperactivity disorder (ADHD), combined type  Comment: new diagnosis based on psychologic eval  Plan: methylphenidate HCl ER, OSM, (CONCERTA) 18 MG         CR tablet        I discussed with the patient risks and benefits of the new medications prescribed including potential side effects.  The patient had opportunity to ask questions and is comfortable with and interested in medications as prescribed.  Will follow up in 3-4 weeks    (Z30.41) Oral contraceptive pill surveillance  Comment: desires to continue, having normal monthly periods  Plan: norethindrone-ethinyl estradiol (ALAYCEN 1/35)         1-35 MG-MCG tablet        Continue current medication      (Z13.6) CARDIOVASCULAR SCREENING; LDL GOAL LESS THAN 160  Comment:   Plan: Lipid panel reflex to direct LDL Fasting        adjust therapy/treatment based on results      (F41.9) Anxiety  Comment: stable, no panic symptoms.  Plan: sertraline (ZOLOFT) 100 MG tablet        Continue current medication  Although discussed that we may be able to decrease in the future if her ADHD feels well controlled              BMI  Estimated body mass index is 28.69 kg/m  as calculated from the following:    Height as of this encounter: 1.65 m (5' 4.96\").    Weight as of this encounter: 78.1 kg (172 lb 3.2 oz).   Weight management plan: Discussed healthy diet and exercise guidelines    Counseling  Appropriate preventive services were discussed with this " patient, including applicable screening as appropriate for fall prevention, nutrition, physical activity, Tobacco-use cessation, weight loss and cognition.  Checklist reviewing preventive services available has been given to the patient.  Reviewed patient's diet, addressing concerns and/or questions.   She is at risk for lack of exercise and has been provided with information to increase physical activity for the benefit of her well-being.       FUTURE APPOINTMENTS:       - Follow-up visit in 3-4 weeks for ADHD    Subjective   Martine is a 33 year old, presenting for the following:  Physical        4/9/2024     1:56 PM   Additional Questions   Roomed by Bishop TEJEDA MA        Health Care Directive  Patient does not have a Health Care Directive or Living Will: Discussed advance care planning with patient; information given to patient to review.    HPI    At noon today, pt had final ADHD evaluation.   Discuss next steps for ADHD.     Most affected by:  Time management  Out of site out of mind  Ability to start tasks - because she is focused on another task  Exhaustion.    Did increase sertraline up to 150mg last visit.  Didn't help with daily racing thoughts.  But is not having panic attacks.    Work life has gotten more stressful over the past year  Likes her work.  But work environment is stressful.    Is .  Her  is supportive.          4/2/2024   General Health   How would you rate your overall physical health? Good   Feel stress (tense, anxious, or unable to sleep) Very much   (!) STRESS CONCERN      4/2/2024   Nutrition   Three or more servings of calcium each day? (!) I DON'T KNOW   Diet: Regular (no restrictions)   How many servings of fruit and vegetables per day? (!) 2-3   How many sweetened beverages each day? 0-1         4/2/2024   Exercise   Days per week of moderate/strenous exercise 3 days   Average minutes spent exercising at this level 120 min         4/2/2024   Social Factors   Frequency of  gathering with friends or relatives Once a week   Worry food won't last until get money to buy more No   Food not last or not have enough money for food? No   Do you have housing?  Yes   Are you worried about losing your housing? No   Lack of transportation? No   Unable to get utilities (heat,electricity)? No         4/2/2024   Dental   Dentist two times every year? Yes         4/2/2024   TB Screening   Were you born outside of the [place]? No     PHQ-2 Score:         4/9/2024     1:58 PM 2/13/2024     8:36 AM   PHQ-2 ( 1999 [employer])   Q1: Little interest or pleasure in doing things 0 1   Q2: Feeling down, depressed or hopeless 1 0   PHQ-2 Score 1 1   Q1: Little interest or pleasure in doing things  Several days   Q2: Feeling down, depressed or hopeless  Not at all   PHQ-2 Score  1            4/2/2024   Substance Use   Alcohol more than 3/day or more than 7/wk No   Do you use any other substances recreationally? (!) ALCOHOL     Social History     Tobacco Use    Smoking status: Never    Smokeless tobacco: Never   Substance Use Topics    Alcohol use: Yes     Comment: 3 drinks per week    Drug use: No             4/2/2024   Breast Cancer Screening   Family history of breast, colon, or ovarian cancer? No / Unknown      Mammogram Screening - Patient under 40 years of age: Routine Mammogram Screening not recommended.         4/2/2024   STI Screening   New sexual partner(s) since last STI/HIV test? No     History of abnormal Pap smear: NO - age 30- 65 PAP every 3 years recommended        Latest Ref Rng & Units 2/28/2023     3:20 PM 9/6/2018     4:34 PM 3/4/2016    12:00 AM   PAP / HPV   PAP  Negative for Intraepithelial Lesion or Malignancy (NILM)      PAP (Historical)   NIL  NIL    HPV 16 DNA Negative Negative      HPV 18 DNA Negative Negative      Other HR HPV Negative Negative              4/2/2024   Contraception/Family Planning   Questions about contraception or family planning No        Reviewed and updated as needed this  "visit by Provider      Problems                     Review of Systems  Constitutional, HEENT, cardiovascular, pulmonary, GI, , musculoskeletal, neuro, skin, endocrine and psych systems are negative, except as otherwise noted.     Objective    Exam  /81 (BP Location: Left arm, Patient Position: Sitting, Cuff Size: Adult Regular)   Pulse 62   Temp 97.6  F (36.4  C) (Oral)   Resp 16   Ht 1.65 m (5' 4.96\")   Wt 78.1 kg (172 lb 3.2 oz)   LMP 04/09/2024   SpO2 98%   Breastfeeding No   BMI 28.69 kg/m     Estimated body mass index is 28.69 kg/m  as calculated from the following:    Height as of this encounter: 1.65 m (5' 4.96\").    Weight as of this encounter: 78.1 kg (172 lb 3.2 oz).    Physical Exam  GENERAL: alert and no distress  EYES: Eyes grossly normal to inspection, PERRL and conjunctivae and sclerae normal  HENT: normal cephalic/atraumatic and ear canals and TM's normal  NECK: no adenopathy, no asymmetry, masses, or scars  RESP: lungs clear to auscultation - no rales, rhonchi or wheezes  BREAST: normal without masses, tenderness or nipple discharge and no palpable axillary masses or adenopathy  CV: regular rate and rhythm, normal S1 S2, no S3 or S4, no murmur, click or rub, no peripheral edema  ABDOMEN: soft, nontender, no hepatosplenomegaly, no masses and bowel sounds normal  MS: no gross musculoskeletal defects noted, no edema  SKIN: no suspicious lesions or rashes  NEURO: Normal strength and tone, mentation intact and speech normal  PSYCH: mentation appears normal, affect normal/bright        Signed Electronically by: Niharika Stewart MD    "
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
## 2024-04-09 NOTE — PROGRESS NOTES
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      Generalized Anxiety Disorder  A. Excessive anxiety and worry about a number of events or activities (such as work or school performance).   B. The person finds it difficult to control the worry.  C. Select 3 or more symptoms (required for diagnosis). Only one item is required in children.   - Restlessness or feeling keyed up or on edge.    - Being easily fatigued.    - Difficulty concentrating or mind going blank.    - Irritability.    - Muscle tension.    - Sleep disturbance (difficulty falling or staying  asleep, or restless unsatisfying sleep).   D. The focus of the anxiety and worry is not confined to features of an Axis I disorder.  E. The anxiety, worry, or physical symptoms cause clinically significant distress or impairment in social, occupational, or other important areas of functioning.   F. The disturbance is not due to the direct physiological effects of a substance (e.g., a drug of abuse, a medication) or a general medical condition (e.g., hyperthyroidism) and does not occur exclusively during a Mood Disorder, a Psychotic Disorder, or a Pervasive Developmental Disorder.    - The aformentioned symptoms began 15 year(s) ago and occurs 7 days per week and is experienced as moderate. Major Depressive Disorder  CRITERIA (A-C) REPRESENT A MAJOR DEPRESSIVE EPISODE - SELECT THESE CRITERIA  A) Recurrent episode(s) - symptoms have been present during the same 2-week period and represent a change from previous functioning 5 or more symptoms (required for diagnosis)   - Diminished interest or pleasure in all, or almost all, activities.    - Decreased sleep.    - Fatigue or loss of energy.    - Feelings of worthlessness or inappropriate and excessive guilt.    - Diminished ability to think or concentrate, or indecisiveness.   B) The symptoms cause clinically significant distress or impairment in social, occupational, or other important areas of functioning  C) The episode is not attributable to the physiological effects of a substance or to another medical condition  D) The occurence of major depressive episode is not better explained by other thought / psychotic disorders  E) There has never been a manic episode or hypomanic episode      Post-traumatic Stress Disorder Diagnostic Criteria:      Exposure to actual or threatened death, serious injury, or sexual violence in one (or more) of the following ways:  Directly experiencing the traumatic event(s). Patient experienced a traumatic arrest experience that did not  result in physical injury or threatened death.     -Witnessing, in person, the event(s) as it occurred to others.  -Learning that the traumatic event(s) occurred to a close family member or close friend. In cases of actual or threatened death of a family member or friend, the event(s) must have been violent or accidental.  -Experiencing repeated or extreme exposure to aversive details of the traumatic event(s) (e.g., first responders collecting human remains; police officers repeatedly exposed to details of child abuse). Note: Criterion A4 does not apply to exposure through electronic media, television, movies, or pictures, unless this exposure is work related.      Presence of one (or more) of the following intrusion symptoms associated with the traumatic event(s), beginning after the traumatic event(s) occurred:  -Recurrent, involuntary, and intrusive distressing memories of the traumatic event(s). Note: In children older than 6 years, repetitive play may occur in which themes or aspects of the traumatic event(s) are expressed. YES  -Recurrent distressing dreams in which the content and/or affect of the dream are related to the traumatic event(s). Note: In children, there may be frightening dreams without recognizable content. YES   -Dissociative reactions (e.g., flashbacks) in which the individual feels or acts as if the traumatic event(s) were recurring. (Such reactions may occur on a continuum, with the most extreme expression being a complete loss of awareness of present surroundings.) Note: In children, trauma-specific reenactment may occur in play.   -Intense or prolonged psychological distress at exposure to internal or external cues that symbolize or resemble an aspect of the traumatic event(s). YES  -Marked physiological reactions to internal or external cues that symbolize or resemble an aspect of the traumatic event(s). YES       Persistent avoidance of stimuli associated with the traumatic event(s),  beginning after the traumatic event(s) occurred, as evidenced by one or both of the following:  -Avoidance of or efforts to avoid distressing memories, thoughts, or feelings about or closely associated with the traumatic event(s). YES  -Avoidance of or efforts to avoid external reminders (people, places, conversations, activities, objects, situations) that arouse distressing memories, thoughts, or feelings about or closely associated with the traumatic event(s). YES         Negative alterations in cognitions and mood associated with the traumatic event(s), beginning or worsening after the traumatic event(s) occurred, as evidenced by two (or more) of the following:  -Inability to remember an important aspect of the traumatic event(s) (typically due to dissociative amnesia, and not to other factors such as head injury, alcohol, or drugs). YES [facility]  -Persistent and exaggerated negative beliefs or expectations about oneself, others, or the world (e.g.,  I am bad,   No one can be trusted,   The world is completely dangerous,   My whole nervous system is permanently ruined ). YES [facility]  -Persistent, distorted cognitions about the cause or consequences of the traumatic event(s) that lead the individual to blame himself/herself or others.  -Persistent negative emotional state (e.g., fear, horror, anger, guilt, or shame). YES [facility]  -Markedly diminished interest or participation in significant activities.   -Feelings of detachment or estrangement from others.   -Persistent inability to experience positive emotions (e.g., inability to experience happiness, satisfaction, or loving feelings).      Marked alterations in arousal and reactivity associated with the traumatic event(s), beginning or worsening after the traumatic event(s) occurred, as evidenced by two (or more) of the following:  -Irritable behavior and angry outbursts (with little or no provocation), typically expressed as verbal or physical aggression toward people or  objects.  -Reckless or self-destructive behavior.   -Hypervigilance. YES nursing home  -Exaggerated startle response.  -Problems with concentration. Unknown-could be due to ADHD or Anxiety  -Sleep disturbance (e.g., difficulty falling or staying asleep or restless sleep). YES nursing home    Duration of the disturbance (Criteria B, C, D and E) is more than 1 month.    The disturbance causes clinically significant distress or impairment in social, occupational, or other important areas of functioning. YES  The disturbance is not attributable to the physiological effects of a substance (e.g., medication, alcohol) or another medical condition.  Specify whether: YES    With dissociative symptoms: The individual s symptoms meet the criteria for posttraumatic stress disorder, and in addition, in response to the stressor, the individual experiences persistent or recurrent symptoms of either of the following:      Patient does not meet diagnostic criteria for PTSD due to the nature of the trauma. Hence, she is being diagnosed with Other Specified Trauma Disorder.       Attention Deficit Hyperactivity-Impulsivity Disorder Diagnostic Criteria:    Inattention: Six or more symptoms of inattention for children up to age 16, or five or more for adolescents 17 and older and adults; symptoms of inattention have been present for at least 6 months, and they are inappropriate for developmental level:    -Often fails to give close attention to details or makes careless mistakes in schoolwork, at work, or with other activities. YES  -Often has trouble holding attention on tasks or play activities.  -Often does not seem to listen when spoken to directly.  -Often does not follow through on instructions and fails to finish schoolwork, chores, or duties in the workplace (e.g., loses focus, side-tracked). YES  -Often has trouble organizing tasks and activities.  -Often avoids, dislikes, or is reluctant to do tasks that require mental effort over a long  period of time (such as schoolwork or homework). YES  -Often loses things necessary for tasks and activities (e.g. school materials, pencils, books, tools, wallets, keys, paperwork, eyeglasses, mobile telephones). YES  -Is often easily distracted YES  -Is often forgetful in daily activities. YES    Hyperactivity and Impulsivity: Six or more symptoms of hyperactivity-impulsivity for children up to age 16, or five or more for adolescents 17 and older and adults; symptoms of hyperactivity-impulsivity have been present for at least 6 months to an extent that is disruptive and inappropriate for the person s developmental level:    -Often fidgets with or taps hands or feet, or squirms in seat. YES  -Often leaves seat in situations when remaining seated is expected.  -Often runs about or climbs in situations where it is not appropriate (adolescents or adults may be limited to feeling restless).  -Often unable to play or take part in leisure activities quietly. YES  -Is often  on the go  acting as if  driven by a motor .   -Often talks excessively. YES  -Often blurts out an answer before a question has been completed. YES  -Often has trouble waiting his/her turn. YES  -Often interrupts or intrudes on others (e.g., butts into conversations or games) YES     In addition, the following conditions must be met:    -Several inattentive or hyperactive-impulsive symptoms were present before age 12 years. YES  -Several symptoms are present in two or more setting, (e.g., at home, school or work; with friends or relatives; in other activities). YES  -There is clear evidence that the symptoms interfere with, or reduce the quality of, social, school, or work functioning. YES  -The symptoms do not happen only during the course of schizophrenia or another psychotic disorder. The symptoms are not better explained by another mental disorder (e.g. Mood Disorder, Anxiety Disorder, Dissociative Disorder, or a Personality Disorder).  YES      Combined Presentation: if enough symptoms of both criteria inattention and hyperactivity-impulsivity were present for the past 6 months         ,  Results of testing were significant for inattention, hyperactivity/impulsivity, depression anxiety, and trauma. Rating scales suggested that the client is currently experiencing symptoms of inattention, hyperactivity/impulsivity that have been present to some extent since childhood. The client endorsed moderate overall symptoms of inattention and hyperactivity-impulsivity. The client reported that she has had attention issues since childhood. The client reported scores of marginal functional impairment and borderline executive functioning. This means that the client has likely made accommodations to help her function with her current abilities, thus she is only mildly impaired with her ability to function in her current roles. She reported scores of mild executive functioning deficits. She reported significant difficulty  with self-management to time and self-organization/problem-solving. The client and her collateral reporters reported similar scores on the rating scales for her current level of functioning; however, the childhood scores were discrepant. Intelligence testing suggested that the client is functioning in the overall High Average range.  Her scores on the working memory tasks were much lower than her other scores. These tasks rely heavily on attention. This pattern is common among people with attention deficits. Patient demonstrated lower scores on the Executive Functioning subtest and Average or Above on the Complex Attention, Cognitive Flexibility, Processing Speed, and Simple Attention CNS Vital Signs Neurocognitive Battery subtests. This pattern is atypical among people with inattention and hyperactivity-impulsivity symptoms. Personality testing was suggestive of the patient experiencing cognitive difficulties, attention and concentration  problems, indecisiveness, being inefficacious, anxiety, stress, trauma, and being self-critical. Additionally, the client is experiencing symptoms of anxiety and depression, which were reported on questionnaires. Moreover, she reported symptoms of trauma via clinical interview.     Despite having a lower BFIS score, the patient reported that the ADHD symptoms impact her life at work and home. Patient reported that she struggles with time management and getting to work on time. Patient reported that she is typically 5-10 minutes late to work. Patient reported that her  becomes annoyed, because the patient is often late when they are trying to get a place on time. Moreover, she reported that her inattention makes it difficult for her to complete chores, and that causes tension with her marriage.       Patient meets diagnostic criteria for ADHD as evidenced above per questionnaires, WAIS, CNS Vital Signs Neurocognitive Battery, MMPI and clinical interview of social history.     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   296.31 (F33.0) Major Depressive Disorder, Recurrent Episode, Mild With anxious distress  300.02 (F41.1) Generalized Anxiety Disorder.  Psychosocial & Contextual Factors: Family planning related stress     Recommendations:     1. Schedule an appointment with your physician to discuss a medication evaluation.  2. Access resources through websites, books, and articles such as those provided in the handout.  3. Consider working with an ADHD  or individual therapist to learn skills to  assist with symptom management, as well as ways to improve relationships,  etc that may have been impacted by your symptoms.  4. Consider attending workshops or support groups through [facility]  ([email or web address]).  5. Individual therapy is recommended for the treatment of anxiety, trauma and depression. Therapies focusing on identifying and challenging problematic thought processes can be beneficial.  You can schedule with an [facility] therapist by calling [phone]. Recommend patient continue working with her current therapist.  6. EMDR therapy is recommend for the treatment of trauma symptoms. You can go to this link to find an EMDR certified therapist: [email or web address]/find-[email or web address]-[email or web address]  7. Schedule a follow-up appointment with me in about six weeks to review symptoms, treatment involvement, and struggles and/or successes.        You May Find the Following ADHD Resources Helpful:    https://www.Tiger Pistol.Buzzmetrics/  https://Celsense.Buzzmetrics/  https://Easy Solutions.Buzzmetrics/  https://CodeRyte.Buzzmetrics/about-2/  https://First Retail.Buzzmetrics/kelly-jean/  https://www.SecureLink.Buzzmetrics/  https://forrest.org/  https://www.jeanna.org/About-Mental-Illness/Mental-[email or web address]/ADHD  https://www.Pubster.Buzzmetrics/  https://VQiao.com.Buzzmetrics/  https://www.Stio.Buzzmetrics/  https://www.[facility].org/    Lisandra Swift Psy.D, CHIP    4/9/2024     Psychological Testing   Billing/Services Summary       Testing Evaluation Services Base: 53495  (1st 60 mins) Add-on: 42598  (each addtl 60 mins)   Record Review and Clarify Referral Question   1-30-24 2:20-2:45PM 25 minutes   Integration/Report Generation   1-30-24 1:45-1:55PM CNS (10)  2-5-24 3:30-4:00PM MMPI (30)  2-5-24 4:30-5:15PM [place] (45)  3-27-24 WAIS Report Writing 12:25-12:40PM (15)  3-27-24 1:20-1:50PM (30), 4:30-5:00PM (30) 4/9/2024  Integrated Report (60)   160 minutes   Post-Service Work   4/9/2024 4:03-4:30PM (27) 27 minutes   Total Time: 212 minutes (3 hours, 32 minutes)   Total Units: 1 3       Test Administration and Scoring Base: 70587  (1st 30 mins) Add-on: 51124  (each addtl 30 mins)   Test Administration (Face-to-Face)  3-27-24  WAIS Admin 11-12:09PM (69) 69 minutes   Scoring (Non-Face-to-Face)   3-27-24 WAIS Scoring 12:10-12:25PM (15) 15 minutes   Total Time: 84 minutes (1 hour, 24 minutes)   Total Units: 1 2       Diagnosis(es): (ICD-10) Attention-Deficit/Hyperactivity Disorder  314.01 (F90.2) Combined presentation;    Adjustment Disorders  309.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   296.31 (F33.0) Major Depressive Disorder, Recurrent Episode, Mild With anxious distress  300.02 (F41.1) Generalized Anxiety Disorder.

## 2024-04-09 NOTE — Clinical Note
Thanks you for your teamwork Dr. Cortes.   It looks like you met with the patient and prescribed her medication to treat the ADHD symptoms.   The patient was diagnosed with ADHD, Combined type.   In addition, they were diagnosed with:  Anxiety, Depression, and Trauma  I encouraged them to schedule with you to discuss medication options.   Their full report is available for your review.   Please let me know if you have any questions.  Lisandra Swift PsyD LP

## 2024-04-09 NOTE — PATIENT INSTRUCTIONS
Preventive Care Advice   This is general advice given by our system to help you stay healthy. However, your care team may have specific advice just for you. Please talk to your care team about your preventive care needs.  Nutrition  Eat 5 or more servings of fruits and vegetables each day.  Try wheat bread, brown rice and whole grain pasta (instead of white bread, rice, and pasta).  Get enough calcium and vitamin D. Check the label on foods and aim for 100% of the RDA (recommended daily allowance).  Lifestyle  Exercise at least 150 minutes each week   (30 minutes a day, 5 days a week).  Do muscle strengthening activities 2 days a week. These help control your weight and prevent disease.  No smoking.  Wear sunscreen to prevent skin cancer.  Have a dental exam and cleaning every 6 months.  Yearly exams  See your health care team every year to talk about:  Any changes in your health.  Any medicines your care team has prescribed.  Preventive care, family planning, and ways to prevent chronic diseases.  Shots (vaccines)   HPV shots (up to age 26), if you've never had them before.  Hepatitis B shots (up to age 59), if you've never had them before.  COVID-19 shot: Get this shot when it's due.  Flu shot: Get a flu shot every year.  Tetanus shot: Get a tetanus shot every 10 years.  Pneumococcal, hepatitis A, and RSV shots: Ask your care team if you need these based on your risk.  Shingles shot (for age 50 and up).  General health tests  Diabetes screening:  Starting at age 35, Get screened for diabetes at least every 3 years.  If you are younger than age 35, ask your care team if you should be screened for diabetes.  Cholesterol test: At age 39, start having a cholesterol test every 5 years, or more often if advised.  Bone density scan (DEXA): At age 50, ask your care team if you should have this scan for osteoporosis (brittle bones).  Hepatitis C: Get tested at least once in your life.  STIs (sexually transmitted  infections)  Before age 24: Ask your care team if you should be screened for STIs.  After age 24: Get screened for STIs if you're at risk. You are at risk for STIs (including HIV) if:  You are sexually active with more than one person.  You don't use condoms every time.  You or a partner was diagnosed with a sexually transmitted infection.  If you are at risk for HIV, ask about PrEP medicine to prevent HIV.  Get tested for HIV at least once in your life, whether you are at risk for HIV or not.  Cancer screening tests  Cervical cancer screening: If you have a cervix, begin getting regular cervical cancer screening tests at age 21. Most people who have regular screenings with normal results can stop after age 65. Talk about this with your provider.  Breast cancer scan (mammogram): If you've ever had breasts, begin having regular mammograms starting at age 40. This is a scan to check for breast cancer.  Colon cancer screening: It is important to start screening for colon cancer at age 45.  Have a colonoscopy test every 10 years (or more often if you're at risk) Or, ask your provider about stool tests like a FIT test every year or Cologuard test every 3 years.  To learn more about your testing options, visit: https://www.[employer]/617840.pdf.  For help making a decision, visit: https://bit.ly/zp52471.  Prostate cancer screening test: If you have a prostate and are age 55 to 69, ask your provider if you would benefit from a yearly prostate cancer screening test.  Lung cancer screening: If you are a current or former smoker age 50 to 80, ask your care team if ongoing lung cancer screenings are right for you.  For informational purposes only. Not to replace the advice of your health care provider. Copyright   2023 [employer]. All rights reserved. Clinically reviewed by the [facility]. [employer] 802181 - REV 01/24.    Learning About Stress  What is stress?     Stress is your  body's response to a hard situation. Your body can have a physical, emotional, or mental response. Stress is a fact of life for most people, and it affects everyone differently. What causes stress for you may not be stressful for someone else.  A lot of things can cause stress. You may feel stress when you go on a job interview, take a test, or run a race. This kind of short-term stress is normal and even useful. It can help you if you need to work hard or react quickly. For example, stress can help you finish an important job on time.  Long-term stress is caused by ongoing stressful situations or events. Examples of long-term stress include long-term health problems, ongoing problems at work, or conflicts in your family. Long-term stress can harm your health.  How does stress affect your health?  When you are stressed, your body responds as though you are in danger. It makes hormones that speed up your heart, make you breathe faster, and give you a burst of energy. This is called the fight-or-flight stress response. If the stress is over quickly, your body goes back to normal and no harm is done.  But if stress happens too often or lasts too long, it can have bad effects. Long-term stress can make you more likely to get sick, and it can make symptoms of some diseases worse. If you tense up when you are stressed, you may develop neck, shoulder, or low back pain. Stress is linked to high blood pressure and heart disease.  Stress also harms your emotional health. It can make you judge, tense, or depressed. Your relationships may suffer, and you may not do well at work or school.  What can you do to manage stress?  You can try these things to help manage stress:   Do something active. Exercise or activity can help reduce stress. Walking is a great way to get started. Even everyday activities such as housecleaning or yard work can help.  Try yoga or steffen chi. These techniques combine exercise and meditation. You may need  some training at first to learn them.  Do something you enjoy. For example, listen to music or go to a movie. Practice your hobby or do volunteer work.  Meditate. This can help you relax, because you are not worrying about what happened before or what may happen in the future.  Do guided imagery. Imagine yourself in any setting that helps you feel calm. You can use online videos, books, or a teacher to guide you.  Do breathing exercises. For example:  From a standing position, bend forward from the waist with your knees slightly bent. Let your arms dangle close to the floor.  Breathe in slowly and deeply as you return to a standing position. Roll up slowly and lift your head last.  Hold your breath for just a few seconds in the standing position.  Breathe out slowly and bend forward from the waist.  Let your feelings out. Talk, laugh, cry, and express anger when you need to. Talking with supportive friends or family, a counselor, or a butch leader about your feelings is a healthy way to relieve stress. Avoid discussing your feelings with people who make you feel worse.  Write. It may help to write about things that are bothering you. This helps you find out how much stress you feel and what is causing it. When you know this, you can find better ways to cope.  What can you do to prevent stress?  You might try some of these things to help prevent stress:  Manage your time. This helps you find time to do the things you want and need to do.  Get enough sleep. Your body recovers from the stresses of the day while you are sleeping.  Get support. Your family, friends, and community can make a difference in how you experience stress.  Limit your news feed. Avoid or limit time on social media or news that may make you feel stressed.  Do something active. Exercise or activity can help reduce stress. Walking is a great way to get started.  Where can you learn more?  Go to https://www.healthwise.net/patiented  Enter N032 in the  "search box to learn more about \"Learning About Stress.\"  Current as of: October 24, 2023               Content Version: 14.0    1283-2868 [employer].   Care instructions adapted under license by your healthcare professional. If you have questions about a medical condition or this instruction, always ask your healthcare professional. [employer] disclaims any warranty or liability for your use of this information.      "

## 2024-04-09 NOTE — Clinical Note
Daniel Cortes,  I just saw the patient and diagnosed her with ADHD Combined type. I will route the information to you around 3pm, but wanted you to get an update before her 12pm appointment.   Lisandra Swift PsyD LP

## 2024-04-23 ENCOUNTER — MYC MEDICAL ADVICE (OUTPATIENT)
Dept: FAMILY MEDICINE | Facility: CLINIC | Age: 34
End: 2024-04-23
Payer: COMMERCIAL

## 2024-04-23 NOTE — TELEPHONE ENCOUNTER
RN replied to patient via [email or web address]. See message for details.     Jus Crenshaw RN, BSN, PHN  [facility]: [place]

## 2024-05-07 ENCOUNTER — VIRTUAL VISIT (OUTPATIENT)
Dept: FAMILY MEDICINE | Facility: CLINIC | Age: 34
End: 2024-05-07
Payer: COMMERCIAL

## 2024-05-07 DIAGNOSIS — F90.2 ATTENTION DEFICIT HYPERACTIVITY DISORDER (ADHD), COMBINED TYPE: Primary | ICD-10-CM

## 2024-05-07 PROCEDURE — 99213 OFFICE O/P EST LOW 20 MIN: CPT | Mod: 95 | Performed by: FAMILY MEDICINE

## 2024-05-07 RX ORDER — METHYLPHENIDATE HYDROCHLORIDE 36 MG/1
36 TABLET ORAL EVERY MORNING
Qty: 30 TABLET | Refills: 0 | Status: SHIPPED | OUTPATIENT
Start: 2024-05-07 | End: 2024-05-16 | Stop reason: ALTCHOICE

## 2024-05-07 NOTE — PROGRESS NOTES
Martine is a 33 year old who is being evaluated via a billable video visit.    How would you like to obtain your AVS? [employer]  If the video visit is dropped, the invitation should be resent by:   Will anyone else be joining your video visit? No      Assessment & Plan     (F90.2) Attention deficit hyperactivity disorder (ADHD), combined type  (primary encounter diagnosis)  Comment: concerta 18mg no side effects, but no benefits  Plan: will increase up to 36 mg dose and check in in another 2 weeks.  If no benefit, then will consider switching medications.              FUTURE APPOINTMENTS:       - Follow-up visit in 2 weeks virtually    Subjective   Martine is a 33 year old, presenting for the following health issues:  A.D.H.D        5/7/2024     3:47 PM   Additional Questions   Roomed by Patient completed check in via [employer].     [email or web address]    History of Present Illness       Reason for visit:  Adhd meds    She eats 2-3 servings of fruits and vegetables daily.She consumes 1 sweetened beverage(s) daily.She exercises with enough effort to increase her heart rate 60 or more minutes per day.  She exercises with enough effort to increase her heart rate 3 or less days per week. She is missing 1 dose(s) of medications per week.  She is not taking prescribed medications regularly due to remembering to take.     Has only forgotten 1-2 times.  Has not noticed any side effects, but also has not seen any benefit.            Review of Systems  Constitutional, HEENT, cardiovascular, pulmonary, gi and gu systems are negative, except as otherwise noted.      Objective           Vitals:  No vitals were obtained today due to virtual visit.    Physical Exam   GENERAL: alert and no distress  EYES: Eyes grossly normal to inspection.  No discharge or erythema, or obvious scleral/conjunctival abnormalities.  RESP: No audible wheeze, cough, or visible cyanosis.    SKIN: Visible skin clear. No significant rash, abnormal pigmentation or  lesions.  NEURO: Cranial nerves grossly intact.  Mentation and speech appropriate for age.  PSYCH: Appropriate affect, tone, and pace of words          Video-Visit Details    Type of service:  Video Visit   Originating Location (pt. Location): Other work    Distant Location (provider location):  On-site  Platform used for Video Visit: Biju  Signed Electronically by: Niharika Stewart MD

## 2024-05-07 NOTE — Clinical Note
Please schedule virtual visit with me in 2 weeks (okay to work in on May 16th (since I will then be on vacation from May 17-May 26)

## 2024-05-16 ENCOUNTER — VIRTUAL VISIT (OUTPATIENT)
Dept: FAMILY MEDICINE | Facility: CLINIC | Age: 34
End: 2024-05-16
Payer: COMMERCIAL

## 2024-05-16 DIAGNOSIS — F90.2 ATTENTION DEFICIT HYPERACTIVITY DISORDER (ADHD), COMBINED TYPE: Primary | ICD-10-CM

## 2024-05-16 PROCEDURE — 99213 OFFICE O/P EST LOW 20 MIN: CPT | Mod: 95 | Performed by: FAMILY MEDICINE

## 2024-05-16 RX ORDER — DEXTROAMPHETAMINE SACCHARATE, AMPHETAMINE ASPARTATE MONOHYDRATE, DEXTROAMPHETAMINE SULFATE AND AMPHETAMINE SULFATE 2.5; 2.5; 2.5; 2.5 MG/1; MG/1; MG/1; MG/1
10 CAPSULE, EXTENDED RELEASE ORAL DAILY
Qty: 30 CAPSULE | Refills: 0 | Status: SHIPPED | OUTPATIENT
Start: 2024-05-16 | End: 2024-06-20

## 2024-05-16 ASSESSMENT — ANXIETY QUESTIONNAIRES
3. WORRYING TOO MUCH ABOUT DIFFERENT THINGS: NEARLY EVERY DAY
6. BECOMING EASILY ANNOYED OR IRRITABLE: SEVERAL DAYS
IF YOU CHECKED OFF ANY PROBLEMS ON THIS QUESTIONNAIRE, HOW DIFFICULT HAVE THESE PROBLEMS MADE IT FOR YOU TO DO YOUR WORK, TAKE CARE OF THINGS AT HOME, OR GET ALONG WITH OTHER PEOPLE: SOMEWHAT DIFFICULT
GAD7 TOTAL SCORE: 14
5. BEING SO RESTLESS THAT IT IS HARD TO SIT STILL: SEVERAL DAYS
1. FEELING NERVOUS, ANXIOUS, OR ON EDGE: MORE THAN HALF THE DAYS
GAD7 TOTAL SCORE: 14
7. FEELING AFRAID AS IF SOMETHING AWFUL MIGHT HAPPEN: MORE THAN HALF THE DAYS
7. FEELING AFRAID AS IF SOMETHING AWFUL MIGHT HAPPEN: MORE THAN HALF THE DAYS
GAD7 TOTAL SCORE: 14
4. TROUBLE RELAXING: MORE THAN HALF THE DAYS
8. IF YOU CHECKED OFF ANY PROBLEMS, HOW DIFFICULT HAVE THESE MADE IT FOR YOU TO DO YOUR WORK, TAKE CARE OF THINGS AT HOME, OR GET ALONG WITH OTHER PEOPLE?: SOMEWHAT DIFFICULT
2. NOT BEING ABLE TO STOP OR CONTROL WORRYING: NEARLY EVERY DAY

## 2024-05-16 ASSESSMENT — PATIENT HEALTH QUESTIONNAIRE - PHQ9
SUM OF ALL RESPONSES TO PHQ QUESTIONS 1-9: 7
SUM OF ALL RESPONSES TO PHQ QUESTIONS 1-9: 7
10. IF YOU CHECKED OFF ANY PROBLEMS, HOW DIFFICULT HAVE THESE PROBLEMS MADE IT FOR YOU TO DO YOUR WORK, TAKE CARE OF THINGS AT HOME, OR GET ALONG WITH OTHER PEOPLE: SOMEWHAT DIFFICULT

## 2024-05-16 NOTE — PROGRESS NOTES
"Martine is a 33 year old who is being evaluated via a billable video visit.    How would you like to obtain your AVS? Laura  If the video visit is dropped, the invitation should be resent by: Text to cell phone: [phone]  Will anyone else be joining your video visit? No      Assessment & Plan     (F90.2) Attention deficit hyperactivity disorder (ADHD), combined type  (primary encounter diagnosis)  Comment: not yet significantly improved on concerta  Plan: amphetamine-dextroamphetamine (ADDERALL XR) 10         MG 24 hr capsule        Will trial a switch to adderall.  Follow up in one month.                Subjective   Martine is a 33 year old, presenting for the following health issues:  A.D.H.D (Medication check)        5/16/2024     2:54 PM   Additional Questions   Roomed by Laura     HPI     Medication Followup of methylphnidate  Taking Medication as prescribed: yes  Side Effects:  None  Medication Helping Symptoms:  NO-not as much as she thinks it should, would like to discuss alternatives    Has felt more energized, less apathetic in the mornings.  But has not felt as focused as she was expecting to feel or have heard from others.   Did not that she feels the effects of feeling more excitable.  And was not able to sleep the morning she took the medication late at 11:30am.      Review of Systems  Constitutional, HEENT, cardiovascular, pulmonary, gi and gu systems are negative, except as otherwise noted.      Objective    Vitals - Patient Reported  Weight (Patient Reported): 77.1 kg (170 lb)  Height (Patient Reported): 165 cm (5' 4.96\")  BMI (Based on Pt Reported Ht/Wt): 28.32  Pain Score: No Pain (0)        Physical Exam   GENERAL: alert and no distress  EYES: Eyes grossly normal to inspection.  No discharge or erythema, or obvious scleral/conjunctival abnormalities.  RESP: No audible wheeze, cough, or visible cyanosis.    SKIN: Visible skin clear. No significant rash, abnormal pigmentation or lesions.  NEURO: " Cranial nerves grossly intact.  Mentation and speech appropriate for age.  PSYCH: Appropriate affect, tone, and pace of words          Video-Visit Details    Type of service:  Video Visit   Originating Location (pt. Location): Other work    Distant Location (provider location):  On-site  Platform used for Video Visit: Biju  Signed Electronically by: Niharika Stewart MD    Answers submitted by the patient for this visit:  Patient Health Questionnaire (Submitted on 5/16/2024)  If you checked off any problems, how difficult have these problems made it for you to do your work, take care of things at home, or get along with other people?: Somewhat difficult  PHQ9 TOTAL SCORE: 7  MICHEAL-7 (Submitted on 5/16/2024)  MICHEAL 7 TOTAL SCORE: 14

## 2024-05-17 DIAGNOSIS — F90.2 ATTENTION DEFICIT HYPERACTIVITY DISORDER (ADHD), COMBINED TYPE: ICD-10-CM

## 2024-05-17 NOTE — TELEPHONE ENCOUNTER
Called patient and left a voicemail message that I was calling to help schedule a virtual follow up with Dr Cortes to recheck ADHD.    Joanna Arroyo

## 2024-05-21 ENCOUNTER — VIRTUAL VISIT (OUTPATIENT)
Dept: PSYCHOLOGY | Facility: CLINIC | Age: 34
End: 2024-05-21
Payer: COMMERCIAL

## 2024-05-21 DIAGNOSIS — F43.89 ADJUSTMENT REACTION TO CHRONIC STRESS: ICD-10-CM

## 2024-05-21 DIAGNOSIS — F33.0 MAJOR DEPRESSIVE DISORDER, RECURRENT EPISODE, MILD WITH ANXIOUS DISTRESS (H): ICD-10-CM

## 2024-05-21 DIAGNOSIS — F90.2 ATTENTION DEFICIT HYPERACTIVITY DISORDER (ADHD), COMBINED TYPE: Primary | ICD-10-CM

## 2024-05-21 DIAGNOSIS — F41.1 GENERALIZED ANXIETY DISORDER: ICD-10-CM

## 2024-05-21 PROCEDURE — 90832 PSYTX W PT 30 MINUTES: CPT | Mod: 95 | Performed by: PSYCHOLOGIST

## 2024-05-21 NOTE — PROGRESS NOTES
[facility]                                     Progress Note    Patient Name: Martine Lewis  Date: 2024          Service Type: Individual      Session Start Time: 12:33PM  Session End Time: 12:55PM    Late start due to patient not on the video     Session Length: 22 minutes    Session #: ADHD Follow Up    Attendees: Client attended alone    Service Modality:  Video Visit:      Provider verified identity through the following two step process.  Patient provided:  Patient     Telemedicine Visit: The patient's condition can be safely assessed and treated via synchronous audio and visual telemedicine encounter.      Reason for Telemedicine Visit: Services only offered telehealth    Originating Site (Patient Location): Patient's place of employment    Distant Site (Provider Location): Provider Remote Setting- Home Office    Consent:  The patient/guardian has verbally consented to: the potential risks and benefits of telemedicine (video visit) versus in person care; bill my insurance or make self-payment for services provided; and responsibility for payment of non-covered services.     Patient would like the video invitation sent by:  My Chart    Mode of Communication:  Video Conference via [facility]    Distant Location (Provider):  Off-site    As the provider I attest to compliance with applicable laws and regulations related to telemedicine.    DATA  Interactive Complexity: No  Crisis: No        Progress Since Last Session (Related to Symptoms / Goals / Homework):   Symptoms: No change for task completion and distractibility     Homework: Partially completed      Episode of Care Goals: Satisfactory progress - ACTION (Actively working towards change); Intervened by reinforcing change plan / affirming steps taken     Current / Ongoing Stressors and Concerns:   Work Related Stress      Treatment Objective(s) Addressed in This Session:   comply with medication 100% of the time      Patient  "reported that she was taking Concerta and she noticed some \"excitement energy.\" Patient reported that she did not noticed a change \"good or bad\" from taking the Concerta.     Patient reported that she started was recently prescribed the Adderall XR; however, she forgot to fill the prescription and start it. Patient reported that she plans to start taking the Adderall this week.     Patient reported that she tends to struggle with time management at work as well as with staying on task.     Patient reported feeling tired in the afternoon. Discussed patient's tendency to eat leftovers with carbohydrates for lunch. Encouraged patient to eat protein throughout the day to help with energy and focus.  Encouraged patient to eat one serving of vegetables daily.     Patient reported that she is still working with current therapist and may transfer to a trauma therapist in the future.    Encouraged patient to complete a task in the moment if is takes fewer than five minutes to complete.    Interventions:  CBT: socratic questioning, normalizing  MI: open ended questions      Assessments completed prior to visit:  The following assessments were completed by patient for this visit:  PHQ9:       12/20/2019     3:03 PM 12/10/2020     4:37 PM 6/5/2023     8:21 AM 11/21/2023     6:05 PM 2/13/2024     8:40 AM 3/27/2024     8:01 AM 5/16/2024     5:13 AM   PHQ-9 SCORE   PHQ-9 Total Score MyChart   7 (Mild depression) 9 (Mild depression) 10 (Moderate depression) 6 (Mild depression) 7 (Mild depression)   PHQ-9 Total Score 4 4 7 9 10 6 7     GAD7:       5/22/2023    10:42 PM 6/5/2023     8:31 AM 11/21/2023     6:06 PM 2/13/2024     8:37 AM 2/13/2024     8:40 AM 3/27/2024     9:50 AM 5/16/2024     2:58 PM   MICHEAL-7 SCORE   Total Score 11 (moderate anxiety) 10 (moderate anxiety) 9 (mild anxiety) 12 (moderate anxiety) 12 (moderate anxiety) 11 (moderate anxiety) 14 (moderate anxiety)   Total Score 11 10 9 12 12 11 14     CAGE-AID:       " 6/5/2023     8:38 AM 2/13/2024     8:38 AM   CAGE-AID Total Score   Total Score  1   Total Score MyChart  1 (A total score of 2 or greater is considered clinically significant)       Information is confidential and restricted. Go to Review Flowsheets to unlock data.     PROMIS 10-Global Health (all questions and answers displayed):       6/5/2023     8:35 AM 11/21/2023     6:08 PM 2/13/2024     8:39 AM   PROMIS 10   In general, would you say your health is:  Very good Good   In general, would you say your quality of life is:  Very good Very good   In general, how would you rate your physical health?  Very good Good   In general, how would you rate your mental health, including your mood and your ability to think?  Good Fair   In general, how would you rate your satisfaction with your social activities and relationships?  Very good Good   In general, please rate how well you carry out your usual social activities and roles  Fair Fair   To what extent are you able to carry out your everyday physical activities such as walking, climbing stairs, carrying groceries, or moving a chair?  Completely Completely   In the past 7 days, how often have you been bothered by emotional problems such as feeling anxious, depressed, or irritable?  Often Often   In the past 7 days, how would you rate your fatigue on average?  Moderate Severe   In the past 7 days, how would you rate your pain on average, where 0 means no pain, and 10 means worst imaginable pain?  1 2   In general, would you say your health is:  4 3   In general, would you say your quality of life is:  4 4   In general, how would you rate your physical health?  4 3   In general, how would you rate your mental health, including your mood and your ability to think?  3 2   In general, how would you rate your satisfaction with your social activities and relationships?  4 3   In general, please rate how well you carry out your usual social activities and roles. (This includes  activities at home, at work and in your community, and responsibilities as a parent, child, spouse, employee, friend, etc.)  2 2   To what extent are you able to carry out your everyday physical activities such as walking, climbing stairs, carrying groceries, or moving a chair?  5 5   In the past 7 days, how often have you been bothered by emotional problems such as feeling anxious, depressed, or irritable?  4 4   In the past 7 days, how would you rate your fatigue on average?  3 4   In the past 7 days, how would you rate your pain on average, where 0 means no pain, and 10 means worst imaginable pain?  1 2   Global Mental Health Score  13 11   Global Physical Health Score  16 14   PROMIS TOTAL - SUBSCORES  29 25       Information is confidential and restricted. Go to Review Flowsheets to unlock data.     Dawson Suicide Severity Rating Scale (Lifetime/Recent)      6/5/2023     9:00 AM 2/13/2024     9:16 AM   Dawson Suicide Severity Rating (Lifetime/Recent)   Q1 Wish to be Dead (Lifetime) Y Y   Wish to be Dead Description (Lifetime) 10 years ago Ages 15-20   1. Wish to be Dead (Past 1 Month) N N   Q2 Non-Specific Active Suicidal Thoughts (Lifetime) N Y   Non-Specific Active Suicidal Thought Description (Lifetime)  Vague thoughts   2. Non-Specific Active Suicidal Thoughts (Past 1 Month)  N   3. Active Suicidal Ideation with any Methods (Not Plan) Without Intent to Act (Lifetime)  Y   Active Suicidal Ideation with any Methods (Not Plan) Description (Lifetime)  Thought about cutting herself or driving off roads   Q3 Active Suicidal Ideation with any Methods (Not Plan) Without Intent to Act (Past 1 Month)  N   Q4 Active Suicidal Ideation with Some Intent to Act, Without Specific Plan (Lifetime)  N   Q5 Active Suicidal Ideation with Specific Plan and Intent (Lifetime)  N   Most Severe Ideation Rating (Lifetime) 2 2   Description of Most Severe Ideation (Lifetime)  Relationship conflicts and societal pressure    Frequency (Lifetime) 3 1   Duration (Lifetime) 2 4   Controllability (Lifetime) 1 4   Deterrents (Lifetime) 1 1   Reasons for Ideation (Lifetime) 5 3   Actual Attempt (Lifetime) N N   Has subject engaged in non-suicidal self-injurious behavior? (Lifetime) Y Y   Has subject engaged in non-suicidal self-injurious behavior? (Past 3 Months) N N   Interrupted Attempts (Lifetime) N N   Aborted or Self-Interrupted Attempt (Lifetime) N N   Preparatory Acts or Behavior (Lifetime) N N   Calculated C-SSRS Risk Score (Lifetime/Recent) No Risk Indicated No Risk Indicated         ASSESSMENT: Current Emotional / Mental Status (status of significant symptoms):   Risk status (Self / Other harm or suicidal ideation)   Patient denies current fears or concerns for personal safety.   Patient denies current or recent suicidal ideation or behaviors.   Patient denies current or recent homicidal ideation or behaviors.   Patient denies current or recent self injurious behavior or ideation.   Patient denies other safety concerns.   Patient reports there has been no change in risk factors since their last session.     Patient reports there has been no change in protective factors since their last session.     Recommended that patient call 911 or go to the local ED should there be a change in any of these risk factors.     Appearance:   Appropriate    Eye Contact:   Good    Psychomotor Behavior: Normal    Attitude:   Cooperative  Interested   Orientation:   All   Speech    Rate / Production: Normal     Volume:  Normal    Mood:    Normal   Affect:    Appropriate    Thought Content:  Clear    Thought Form:  Coherent  Logical    Insight:    Good      Medication Review:   Changes to psychiatric medications, see updated Medication List in EPIC.      Medication Compliance:   Yes     Changes in Health Issues:   None reported     Chemical Use Review:   Substance Use: Chemical use reviewed, no active concerns identified      Tobacco Use: No current  tobacco use.      Diagnosis:  1. Attention deficit hyperactivity disorder (ADHD), combined type    2. Adjustment reaction to chronic stress    3. Generalized anxiety disorder    4. Major depressive disorder, recurrent episode, mild with anxious distress (H24)         Collateral Reports Completed:   Not Applicable    PLAN: (Patient Tasks / Therapist Tasks / Other)        Patient was informed that the assessment process has been completed and that they can contact [facility] in the future if they have mental health concerns.       Lisandra Swfit Psy.D,    5/21/2024                                                          ______________________________________________________________________

## 2024-05-22 NOTE — TELEPHONE ENCOUNTER
3rd attempt.  Closing encounter    Called patient and left a voicemail message that I was calling to help schedule a virtual follow up with Dr Cortes to recheck ADHD.    Joanna Arroyo

## 2024-06-20 RX ORDER — DEXTROAMPHETAMINE SACCHARATE, AMPHETAMINE ASPARTATE MONOHYDRATE, DEXTROAMPHETAMINE SULFATE AND AMPHETAMINE SULFATE 2.5; 2.5; 2.5; 2.5 MG/1; MG/1; MG/1; MG/1
10 CAPSULE, EXTENDED RELEASE ORAL DAILY
Qty: 30 CAPSULE | Refills: 0 | Status: SHIPPED | OUTPATIENT
Start: 2024-06-20 | End: 2024-07-11 | Stop reason: DRUGHIGH

## 2024-06-20 NOTE — TELEPHONE ENCOUNTER
Patient returned call.  Virtual visit scheduled for July 11th.  Patient will need refill of medication,  amphetamine-dextroamphetamine (ADDERALL XR) 10 MG 24 hr capsule.    Has one pill left for tomorrow.    Alyssa Blum,

## 2024-07-11 ENCOUNTER — VIRTUAL VISIT (OUTPATIENT)
Dept: FAMILY MEDICINE | Facility: CLINIC | Age: 34
End: 2024-07-11
Payer: COMMERCIAL

## 2024-07-11 DIAGNOSIS — F90.2 ATTENTION DEFICIT HYPERACTIVITY DISORDER (ADHD), COMBINED TYPE: Primary | ICD-10-CM

## 2024-07-11 PROCEDURE — 99441 PR PHYSICIAN TELEPHONE EVALUATION 5-10 MIN: CPT | Mod: 93 | Performed by: FAMILY MEDICINE

## 2024-07-11 RX ORDER — DEXTROAMPHETAMINE SACCHARATE, AMPHETAMINE ASPARTATE MONOHYDRATE, DEXTROAMPHETAMINE SULFATE AND AMPHETAMINE SULFATE 5; 5; 5; 5 MG/1; MG/1; MG/1; MG/1
20 CAPSULE, EXTENDED RELEASE ORAL DAILY
Qty: 30 CAPSULE | Refills: 0 | Status: SHIPPED | OUTPATIENT
Start: 2024-07-11 | End: 2024-08-10

## 2024-07-11 RX ORDER — DEXTROAMPHETAMINE SACCHARATE, AMPHETAMINE ASPARTATE MONOHYDRATE, DEXTROAMPHETAMINE SULFATE AND AMPHETAMINE SULFATE 5; 5; 5; 5 MG/1; MG/1; MG/1; MG/1
20 CAPSULE, EXTENDED RELEASE ORAL DAILY
Qty: 30 CAPSULE | Refills: 0 | Status: SHIPPED | OUTPATIENT
Start: 2024-09-09 | End: 2024-09-06 | Stop reason: DRUGHIGH

## 2024-07-11 RX ORDER — DEXTROAMPHETAMINE SACCHARATE, AMPHETAMINE ASPARTATE MONOHYDRATE, DEXTROAMPHETAMINE SULFATE AND AMPHETAMINE SULFATE 5; 5; 5; 5 MG/1; MG/1; MG/1; MG/1
20 CAPSULE, EXTENDED RELEASE ORAL DAILY
Qty: 30 CAPSULE | Refills: 0 | Status: SHIPPED | OUTPATIENT
Start: 2024-08-10 | End: 2024-09-06 | Stop reason: DRUGHIGH

## 2024-07-11 ASSESSMENT — ANXIETY QUESTIONNAIRES
GAD7 TOTAL SCORE: 13
GAD7 TOTAL SCORE: 13
IF YOU CHECKED OFF ANY PROBLEMS ON THIS QUESTIONNAIRE, HOW DIFFICULT HAVE THESE PROBLEMS MADE IT FOR YOU TO DO YOUR WORK, TAKE CARE OF THINGS AT HOME, OR GET ALONG WITH OTHER PEOPLE: VERY DIFFICULT
7. FEELING AFRAID AS IF SOMETHING AWFUL MIGHT HAPPEN: SEVERAL DAYS
2. NOT BEING ABLE TO STOP OR CONTROL WORRYING: MORE THAN HALF THE DAYS
5. BEING SO RESTLESS THAT IT IS HARD TO SIT STILL: SEVERAL DAYS
7. FEELING AFRAID AS IF SOMETHING AWFUL MIGHT HAPPEN: SEVERAL DAYS
8. IF YOU CHECKED OFF ANY PROBLEMS, HOW DIFFICULT HAVE THESE MADE IT FOR YOU TO DO YOUR WORK, TAKE CARE OF THINGS AT HOME, OR GET ALONG WITH OTHER PEOPLE?: VERY DIFFICULT
6. BECOMING EASILY ANNOYED OR IRRITABLE: MORE THAN HALF THE DAYS
4. TROUBLE RELAXING: SEVERAL DAYS
GAD7 TOTAL SCORE: 13
3. WORRYING TOO MUCH ABOUT DIFFERENT THINGS: NEARLY EVERY DAY
1. FEELING NERVOUS, ANXIOUS, OR ON EDGE: NEARLY EVERY DAY

## 2024-07-11 ASSESSMENT — PATIENT HEALTH QUESTIONNAIRE - PHQ9: SUM OF ALL RESPONSES TO PHQ QUESTIONS 1-9: 12

## 2024-07-11 NOTE — PROGRESS NOTES
Martine is a 34 year old who is being evaluated via a billable video visit.    How would you like to obtain your AVS? MyChart  If the video visit is dropped, the invitation should be resent by: Text to cell phone: [phone]  Will anyone else be joining your video visit? No      Assessment & Plan     (F90.2) Attention deficit hyperactivity disorder (ADHD), combined type  (primary encounter diagnosis)  Comment: improved but not ideally controlled  Plan: amphetamine-dextroamphetamine (ADDERALL XR) 20         MG 24 hr capsule, amphetamine-dextroamphetamine        (ADDERALL XR) 20 MG 24 hr capsule,         amphetamine-dextroamphetamine (ADDERALL XR) 20         MG 24 hr capsule        Will increase to to 20mg and follow up in about two months          Depression Screening Follow Up        Follow Up Actions Taken         FUTURE APPOINTMENTS:       - Follow-up visit in 2 months    Subjective   Martine is a 34 year old, presenting for the following health issues:  Recheck Medication        7/11/2024    10:47 AM   Additional Questions   Roomed by Connie   Accompanied by none         7/11/2024    10:47 AM   Patient Reported Additional Medications   Patient reports taking the following new medications none     HPI     Medication Followup of adderall XR  Taking Medication as prescribed: yes  Side Effects:  thinks there is some appetitie suppression, when realizes that she is hungry she becomes angry about it  Medication Helping Symptoms:  yes it is helping some, can tell when it wears off and she feels exhausted when it does, she does have periods where she feels focused during the day, has helped with racing thoughts  Gives her bursts of energy and ability to focus.  Takes it at 6:45  - but feels like it wears off around 2 pm.      Finds that she is hungry more quickly than previously    No significant increase in anxiety symptoms and with the increased focus feels less anxiety      Review of Systems  Constitutional, HEENT,  "cardiovascular, pulmonary, gi and gu systems are negative, except as otherwise noted.      Objective    Vitals - Patient Reported  Weight (Patient Reported): 74.8 kg (165 lb)  Height (Patient Reported): 165 cm (5' 4.96\")  BMI (Based on Pt Reported Ht/Wt): 27.49  Pain Score: No Pain (0)        Physical Exam   GENERAL: alert and no distress  RESP: No audible wheeze, cough, or visible cyanosis.    PSYCH: Appropriate affect, tone, and pace of words          Video-Visit Details    Start 4:25 PM  Stop 4:31 PM    Type of service:  Video Visit - switched to telephone due to computer issues on my end  Originating Location (pt. Location): Home    Distant Location (provider location):  On-site  Platform used for Video Visit: Biju  Signed Electronically by: Niharika Stewart MD    "

## 2024-07-11 NOTE — Clinical Note
Please schedule virtual visit with me in 6-8 weeks [facility]  ED Nurse Handoff Report    ED Chief complaint: Fall      ED Diagnosis:   Final diagnoses:   Left displaced femoral neck fracture (H)   Fall, initial encounter       Code Status: none on file, admitting to confirm    Allergies: No Known Allergies    Patient Story: missed step while walking outside with son and fell and rolled backwards  Focused Assessment:  L hip pain, abrasion to L knee  Labs Ordered and Resulted from Time of ED Arrival to Time of ED Departure   BASIC METABOLIC PANEL - Abnormal       Result Value    Sodium 138      Potassium 3.8      Chloride 105      Carbon Dioxide (CO2) 27      Anion Gap 6      Urea Nitrogen 16      Creatinine 0.60      Calcium 8.8      Glucose 125 (*)     GFR Estimate 87     COVID-19 VIRUS (CORONAVIRUS) BY PCR - Normal    SARS CoV2 PCR Negative     CBC WITH PLATELETS AND DIFFERENTIAL    WBC Count 9.0      RBC Count 4.48      Hemoglobin 13.2      Hematocrit 40.0      MCV 89      MCH 29.5      MCHC 33.0      RDW 12.2      Platelet Count 205      % Neutrophils 82      % Lymphocytes 11      % Monocytes 6      % Eosinophils 1      % Basophils 0      % Immature Granulocytes 0      NRBCs per 100 WBC 0      Absolute Neutrophils 7.4      Absolute Lymphocytes 1.0      Absolute Monocytes 0.6      Absolute Eosinophils 0.1      Absolute Basophils 0.0      Absolute Immature Granulocytes 0.0      Absolute NRBCs 0.0       CT Hip Left w/o Contrast   Final Result   IMPRESSION:   1.  There is a moderately displaced oblique fracture of the left femoral neck with mild to moderate impaction.   2.  Multiple gallstones in the gallbladder.         XR Pelvis w Hip Left 1 View   Final Result   IMPRESSION: There is likely a fracture of the left greater trochanter and base of the femoral neck, however this is suboptimally evaluated due to internal rotation on the frontal view and suboptimal image quality on the lateral view. Recommend CT.      Underlying diffuse bony  "demineralization. Degenerative changes in the lumbar spine.            Treatments and/or interventions provided: dressing to L knee, dilaudid  Patient's response to treatments and/or interventions: pain controlled when not moving    Patient unable to void - bladder scan 745+, straight cath. MDs aware.    To be done/followed up on inpatient unit:  per admitting    Does this patient have any cognitive concerns?: Baseline dementia (using call button in ED)    Activity level - Baseline/Home:  Independent  Activity Level - Current:   Total Care    Patient's Preferred language: English   Needed?: No    Isolation: None  Infection: Not Applicable  Patient tested for COVID 19 prior to admission: YES  Bariatric?: No    Vital Signs:   Vitals:    06/04/22 1540 06/04/22 1600 06/04/22 1700 06/04/22 1800   BP: (!) 150/74      Pulse: 61      Resp: 17      Temp: 97.7  F (36.5  C)      TempSrc: Oral      SpO2: 98% 99% 98% 99%   Weight: 59 kg (130 lb)      Height: 1.702 m (5' 7\")          Cardiac Rhythm:     Was the PSS-3 completed:   Yes  What interventions are required if any?               Family Comments: son at bedside in ED  OBS brochure/video discussed/provided to patient/family: N/A    For the majority of the shift this patient's behavior was Green.   Behavioral interventions performed were care and rounding.    ED NURSE PHONE NUMBER: *[phone]         "

## 2024-07-12 ENCOUNTER — TELEPHONE (OUTPATIENT)
Dept: FAMILY MEDICINE | Facility: CLINIC | Age: 34
End: 2024-07-12
Payer: COMMERCIAL

## 2024-07-12 NOTE — TELEPHONE ENCOUNTER
Niharika Cortes MD  P Ne   Please schedule virtual visit with me in 6-8 weeks    Copied from CC chart.    Joanna Arroyo [facility]

## 2024-07-12 NOTE — TELEPHONE ENCOUNTER
Called patient and left a voicemail message to call the clinic and schedule a Virtual follow up appointment with Dr Cortes in 6 -8 weeks.    Joanna Arroyo

## 2024-09-06 ENCOUNTER — VIRTUAL VISIT (OUTPATIENT)
Dept: FAMILY MEDICINE | Facility: CLINIC | Age: 34
End: 2024-09-06
Payer: COMMERCIAL

## 2024-09-06 DIAGNOSIS — F33.41 RECURRENT MAJOR DEPRESSIVE DISORDER, IN PARTIAL REMISSION (H): ICD-10-CM

## 2024-09-06 DIAGNOSIS — F90.2 ATTENTION DEFICIT HYPERACTIVITY DISORDER (ADHD), COMBINED TYPE: Primary | ICD-10-CM

## 2024-09-06 PROBLEM — F33.9 MAJOR DEPRESSION, RECURRENT (H): Status: ACTIVE | Noted: 2024-09-06

## 2024-09-06 PROCEDURE — 99213 OFFICE O/P EST LOW 20 MIN: CPT | Mod: 95 | Performed by: FAMILY MEDICINE

## 2024-09-06 RX ORDER — DEXTROAMPHETAMINE SACCHARATE, AMPHETAMINE ASPARTATE MONOHYDRATE, DEXTROAMPHETAMINE SULFATE AND AMPHETAMINE SULFATE 7.5; 7.5; 7.5; 7.5 MG/1; MG/1; MG/1; MG/1
30 CAPSULE, EXTENDED RELEASE ORAL DAILY
Qty: 30 CAPSULE | Refills: 0 | Status: SHIPPED | OUTPATIENT
Start: 2024-10-06 | End: 2024-11-05

## 2024-09-06 RX ORDER — DEXTROAMPHETAMINE SACCHARATE, AMPHETAMINE ASPARTATE MONOHYDRATE, DEXTROAMPHETAMINE SULFATE AND AMPHETAMINE SULFATE 7.5; 7.5; 7.5; 7.5 MG/1; MG/1; MG/1; MG/1
30 CAPSULE, EXTENDED RELEASE ORAL DAILY
Qty: 30 CAPSULE | Refills: 0 | Status: SHIPPED | OUTPATIENT
Start: 2024-09-06 | End: 2024-10-06

## 2024-09-06 RX ORDER — DEXTROAMPHETAMINE SACCHARATE, AMPHETAMINE ASPARTATE MONOHYDRATE, DEXTROAMPHETAMINE SULFATE AND AMPHETAMINE SULFATE 7.5; 7.5; 7.5; 7.5 MG/1; MG/1; MG/1; MG/1
30 CAPSULE, EXTENDED RELEASE ORAL DAILY
Qty: 30 CAPSULE | Refills: 0 | Status: SHIPPED | OUTPATIENT
Start: 2024-11-05 | End: 2024-12-05

## 2024-09-06 ASSESSMENT — PATIENT HEALTH QUESTIONNAIRE - PHQ9
SUM OF ALL RESPONSES TO PHQ QUESTIONS 1-9: 12
5. POOR APPETITE OR OVEREATING: MORE THAN HALF THE DAYS

## 2024-09-06 ASSESSMENT — ANXIETY QUESTIONNAIRES
1. FEELING NERVOUS, ANXIOUS, OR ON EDGE: SEVERAL DAYS
GAD7 TOTAL SCORE: 9
IF YOU CHECKED OFF ANY PROBLEMS ON THIS QUESTIONNAIRE, HOW DIFFICULT HAVE THESE PROBLEMS MADE IT FOR YOU TO DO YOUR WORK, TAKE CARE OF THINGS AT HOME, OR GET ALONG WITH OTHER PEOPLE: SOMEWHAT DIFFICULT
2. NOT BEING ABLE TO STOP OR CONTROL WORRYING: SEVERAL DAYS
5. BEING SO RESTLESS THAT IT IS HARD TO SIT STILL: SEVERAL DAYS
7. FEELING AFRAID AS IF SOMETHING AWFUL MIGHT HAPPEN: SEVERAL DAYS
6. BECOMING EASILY ANNOYED OR IRRITABLE: MORE THAN HALF THE DAYS
GAD7 TOTAL SCORE: 9
3. WORRYING TOO MUCH ABOUT DIFFERENT THINGS: SEVERAL DAYS

## 2024-09-06 NOTE — PROGRESS NOTES
Martine is a 34 year old who is being evaluated via a billable video visit.    How would you like to obtain your AVS? MyChart  If the video visit is dropped, the invitation should be resent by: Send to e-mail at: [email or web address]  Will anyone else be joining your video visit? No      Assessment & Plan     (F90.2) Attention deficit hyperactivity disorder (ADHD), combined type  (primary encounter diagnosis)  Comment: improved focus and end of day energy.  Only occasional lunchtime appetite suppression. No difficulty with sleep  Plan: amphetamine-dextroamphetamine (ADDERALL XR) 30         MG 24 hr capsule, amphetamine-dextroamphetamine        (ADDERALL XR) 30 MG 24 hr capsule,         amphetamine-dextroamphetamine (ADDERALL XR) 30         MG 24 hr capsule        Will trial raising dose to 30mg  Follow up in 6months if going well, sooner if dose adjustment is not going well    (F33.41) Recurrent major depressive disorder, in partial remission (H24)  Comment: symptoms increase reflect stress of month of August in her work  Plan: Continue current medication            Depression Screening Follow Up        9/6/2024    10:30 AM   PHQ   PHQ-9 Total Score 12   Q9: Thoughts of better off dead/self-harm past 2 weeks Not at all           Follow Up Actions Taken  Mood changes related to life situation - continue current medication        FUTURE APPOINTMENTS:       - Follow-up visit in 6 months    Subjective   Martine is a 34 year old, presenting for the following health issues:  Recheck Medication (ADHD med )      9/6/2024    10:29 AM   Additional Questions   Roomed by Shazia ANGELO     History of Present Illness       Reason for visit:  Adhd med check    She eats 0-1 servings of fruits and vegetables daily.She consumes 1 sweetened beverage(s) daily.She exercises with enough effort to increase her heart rate 60 or more minutes per day.  She exercises with enough effort to increase her heart rate 3 or less days per week. She is missing 1  dose(s) of medications per week.  She is not taking prescribed medications regularly due to remembering to take.         Depression and Anxiety   How are you doing with your depression since your last visit? Worsened    How are you doing with your anxiety since your last visit?  Worsened    Are you having other symptoms that might be associated with depression or anxiety? No  Have you had a significant life event? No   Do you have any concerns with your use of alcohol or other drugs? No    Social History     Tobacco Use    Smoking status: Never     Passive exposure: Never    Smokeless tobacco: Never   Vaping Use    Vaping status: Never Used   Substance Use Topics    Alcohol use: Yes     Comment: 3 drinks per week    Drug use: No         5/16/2024     5:13 AM 7/11/2024    10:47 AM 9/6/2024    10:30 AM   PHQ   PHQ-9 Total Score 7 12 12   Q9: Thoughts of better off dead/self-harm past 2 weeks Not at all Not at all Not at all         5/16/2024     2:58 PM 7/11/2024    10:53 AM 9/6/2024    10:30 AM   MICHEAL-7 SCORE   Total Score 14 (moderate anxiety) 13 (moderate anxiety)    Total Score 14 13 9         9/6/2024    10:30 AM   Last PHQ-9   1.  Little interest or pleasure in doing things 1   2.  Feeling down, depressed, or hopeless 0   3.  Trouble falling or staying asleep, or sleeping too much 3   4.  Feeling tired or having little energy 3   5.  Poor appetite or overeating 2   6.  Feeling bad about yourself 1   7.  Trouble concentrating 2   8.  Moving slowly or restless 0   Q9: Thoughts of better off dead/self-harm past 2 weeks 0   PHQ-9 Total Score 12   Difficulty at work, home, or with people Somewhat difficult         9/6/2024    10:30 AM   MICHEAL-7    1. Feeling nervous, anxious, or on edge 1   2. Not being able to stop or control worrying 1   3. Worrying too much about different things 1   4. Trouble relaxing 2   5. Being so restless that it is hard to sit still 1   6. Becoming easily annoyed or irritable 2   7. Feeling  afraid, as if something awful might happen 1   MICHEAL-7 Total Score 9   If you checked any problems, how difficult have they made it for you to do your work, take care of things at home, or get along with other people? Somewhat difficult       Suicide Assessment Five-step Evaluation and Treatment (SAFE-T)      Patient and family do have COVID right now.  symptoms improving.    Review of Systems  Constitutional, HEENT, cardiovascular, pulmonary, gi and gu systems are negative, except as otherwise noted.      Objective           Vitals:  No vitals were obtained today due to virtual visit.    Physical Exam   GENERAL: alert and no distress  EYES: Eyes grossly normal to inspection.  No discharge or erythema, or obvious scleral/conjunctival abnormalities.  RESP: No audible wheeze, cough, or visible cyanosis.    SKIN: Visible skin clear. No significant rash, abnormal pigmentation or lesions.  NEURO: Cranial nerves grossly intact.  Mentation and speech appropriate for age.  PSYCH: Appropriate affect, tone, and pace of words          Video-Visit Details    Type of service:  Video Visit   Originating Location (pt. Location): Home    Distant Location (provider location):  Off-site  Platform used for Video Visit: Biju  Signed Electronically by: Niharika Stewart MD

## 2024-10-17 ENCOUNTER — LAB (OUTPATIENT)
Dept: LAB | Facility: CLINIC | Age: 34
End: 2024-10-17
Payer: COMMERCIAL

## 2024-10-17 DIAGNOSIS — Z13.6 CARDIOVASCULAR SCREENING; LDL GOAL LESS THAN 160: ICD-10-CM

## 2024-10-17 LAB
CHOLEST SERPL-MCNC: 221 MG/DL
FASTING STATUS PATIENT QL REPORTED: YES
HDLC SERPL-MCNC: 53 MG/DL
LDLC SERPL CALC-MCNC: 135 MG/DL
NONHDLC SERPL-MCNC: 168 MG/DL
TRIGL SERPL-MCNC: 165 MG/DL

## 2024-10-17 PROCEDURE — 80061 LIPID PANEL: CPT

## 2024-10-17 PROCEDURE — 36415 COLL VENOUS BLD VENIPUNCTURE: CPT

## 2024-10-24 ENCOUNTER — TRANSFERRED RECORDS (OUTPATIENT)
Dept: HEALTH INFORMATION MANAGEMENT | Facility: CLINIC | Age: 34
End: 2024-10-24
Payer: COMMERCIAL

## 2024-10-26 NOTE — TELEPHONE ENCOUNTER
Called patient and LMOM to call back on the TC line if she would like to set up an appt with Dr. Cortes.    Thanks!  Jesse Unger      
Called patient and left a VM message - calling to help her schedule phone/in person appointment w/ Dr Cortes.    Joanna Arroyo      
Called patient and left a VM message to see if I could help her schedule an appointment w/ Dr Cortes.    Joanna Arroyo      
Lm for pt to call back and schedule appt.  Mame Steel, SEAN      
Niharika Cortes MD  P Ne [employer]             Advised patient to follow up with me in 3-4 months.   Please reach out to patient to schedule - and determine if she would prefer phone or in person visit.        
Patient is scheduled to see Dr. Cortes.    Thanks!  Jesse Unger      
Patient returns call to the TC line requesting a call back to set up this appointment cell: [phone] or work: [phone].    Called and left message on both machines for patient to call the TC line, [phone], to schedule a follow up.    Thanks!  Jesse Unger        
English

## 2025-01-22 ENCOUNTER — MYC MEDICAL ADVICE (OUTPATIENT)
Dept: FAMILY MEDICINE | Facility: CLINIC | Age: 35
End: 2025-01-22
Payer: COMMERCIAL

## 2025-01-22 NOTE — TELEPHONE ENCOUNTER
RN replied to patient via [employer]. See message for details.     Jus Crenshaw RN, BSN, PHN  [facility]: [place]

## 2025-01-30 ENCOUNTER — TRANSFERRED RECORDS (OUTPATIENT)
Dept: HEALTH INFORMATION MANAGEMENT | Facility: CLINIC | Age: 35
End: 2025-01-30
Payer: COMMERCIAL

## 2025-02-03 ENCOUNTER — OFFICE VISIT (OUTPATIENT)
Dept: FAMILY MEDICINE | Facility: CLINIC | Age: 35
End: 2025-02-03
Payer: COMMERCIAL

## 2025-02-03 VITALS
HEART RATE: 78 BPM | HEIGHT: 65 IN | DIASTOLIC BLOOD PRESSURE: 76 MMHG | WEIGHT: 164 LBS | TEMPERATURE: 98.6 F | OXYGEN SATURATION: 97 % | SYSTOLIC BLOOD PRESSURE: 121 MMHG | RESPIRATION RATE: 16 BRPM | BODY MASS INDEX: 27.32 KG/M2

## 2025-02-03 DIAGNOSIS — S69.92XD INJURY OF LEFT THUMB, SUBSEQUENT ENCOUNTER: ICD-10-CM

## 2025-02-03 DIAGNOSIS — Z01.818 PRE-OPERATIVE GENERAL PHYSICAL EXAMINATION: Primary | ICD-10-CM

## 2025-02-03 DIAGNOSIS — T14.8XXA LIGAMENT TEAR: ICD-10-CM

## 2025-02-03 LAB
ALBUMIN SERPL BCG-MCNC: 4.3 G/DL (ref 3.5–5.2)
ALP SERPL-CCNC: 51 U/L (ref 40–150)
ALT SERPL W P-5'-P-CCNC: 13 U/L (ref 0–50)
ANION GAP SERPL CALCULATED.3IONS-SCNC: 13 MMOL/L (ref 7–15)
AST SERPL W P-5'-P-CCNC: 34 U/L (ref 0–45)
BILIRUB SERPL-MCNC: 0.4 MG/DL
BUN SERPL-MCNC: 11.4 MG/DL (ref 6–20)
CALCIUM SERPL-MCNC: 9.7 MG/DL (ref 8.8–10.4)
CHLORIDE SERPL-SCNC: 100 MMOL/L (ref 98–107)
CREAT SERPL-MCNC: 0.78 MG/DL (ref 0.51–0.95)
EGFRCR SERPLBLD CKD-EPI 2021: >90 ML/MIN/1.73M2
GLUCOSE SERPL-MCNC: 85 MG/DL (ref 70–99)
HCO3 SERPL-SCNC: 23 MMOL/L (ref 22–29)
POTASSIUM SERPL-SCNC: 3.9 MMOL/L (ref 3.4–5.3)
PROT SERPL-MCNC: 7.7 G/DL (ref 6.4–8.3)
SODIUM SERPL-SCNC: 136 MMOL/L (ref 135–145)

## 2025-02-03 PROCEDURE — 36415 COLL VENOUS BLD VENIPUNCTURE: CPT | Performed by: INTERNAL MEDICINE

## 2025-02-03 PROCEDURE — 99214 OFFICE O/P EST MOD 30 MIN: CPT | Performed by: INTERNAL MEDICINE

## 2025-02-03 PROCEDURE — 93000 ELECTROCARDIOGRAM COMPLETE: CPT | Performed by: INTERNAL MEDICINE

## 2025-02-03 PROCEDURE — 80053 COMPREHEN METABOLIC PANEL: CPT | Performed by: INTERNAL MEDICINE

## 2025-02-03 ASSESSMENT — PATIENT HEALTH QUESTIONNAIRE - PHQ9
10. IF YOU CHECKED OFF ANY PROBLEMS, HOW DIFFICULT HAVE THESE PROBLEMS MADE IT FOR YOU TO DO YOUR WORK, TAKE CARE OF THINGS AT HOME, OR GET ALONG WITH OTHER PEOPLE: SOMEWHAT DIFFICULT
SUM OF ALL RESPONSES TO PHQ QUESTIONS 1-9: 8
SUM OF ALL RESPONSES TO PHQ QUESTIONS 1-9: 8

## 2025-02-03 ASSESSMENT — PAIN SCALES - GENERAL: PAINLEVEL_OUTOF10: NO PAIN (0)

## 2025-02-03 NOTE — PROGRESS NOTES
Preoperative Evaluation  [facility]  [address] [place] [address], [address]  [facility] [address]  Phone: [phone]  Primary Provider: Niharika Stewart MD  Pre-op Performing Provider: Maggy Gonzalez MD  Feb 3, 2025             2/3/2025   Surgical Information   What procedure is being done? L Thumb ligament repair   Facility or Hospital where procedure/surgery will be performed: [facility]   Who is doing the procedure / surgery? Dr Roht   Date of surgery / procedure: 2/10/2025   Time of surgery / procedure: Afternoon   Where do you plan to recover after surgery? at home with family     Fax number for surgical facility: [phone]    Assessment & Plan     The proposed surgical procedure is considered INTERMEDIATE risk.    Problem List Items Addressed This Visit    None  Visit Diagnoses       Pre-operative general physical examination    -  Primary    Relevant Orders    Comprehensive metabolic panel (BMP + Alb, Alk Phos, ALT, AST, Total. Bili, TP)    EKG 12-lead complete w/read - Clinics (Completed)    Injury of left thumb, subsequent encounter        Ligament tear        OF left thumb.                - No identified additional risk factors other than previously addressed    Antiplatelet or Anticoagulation Medication Instructions   - Patient is on no antiplatelet or anticoagulation medications.    Additional Medication Instructions   - SSRIs, SNRIs, TCAs, Antipsychotics: Continue without modification.     Recommendation  Approval given to proceed with proposed procedure, without further diagnostic evaluation.  Check chemistry panel.  Check EKG history of PVCs check QT intervals, patient on SSRI.  Can continue medication in the perioperative.  Advised to hold oral contraceptives at least 2 weeks but her surgery coming up in 1 week, she may elect to hold the oral contraceptive 1 week prior to but there is concern with withdrawal bleeding.  Patient to ambulate as early as possible after surgery and  keep well-hydrated in the perioperative period, patient will be cleared for surgery;  If all labs are normal and EKG normal.  All questions answered.    Addendum: EKG and labs reviewed.  Patient cleared for surgery.    Yadira Farley is a 34 year old, presenting for the following:  Pre-Op Exam          9/6/2024    10:29 AM   Additional Questions   Roomed by Shazia BUENO related to upcoming procedure: Patient presents for preop clearance she will be getting surgery to the left lumbar area from an injury she sustained back in April 2024.  Has some hyper laxity of the left some area with secondary pain  From the injury, she was told she may have an RCL tear in left thumb.  Patient maintained on chronic sertraline as well as OCP also is on Adderall..  No known history of blood clots, or bleeding disorders or complication from anesthesia.  Paternal grandmother with history of blood clots.  No other first-degree relatives with blood clots.  Patient has no other chronic health conditions, no known sleep apnea.  She had a Holter monitor that showed some PVCs.  Has occasional palpitations.  No chest pain or shortness of breath.  Not known to have asthma.  Is on amphetamine/dextroamphetamine XR 30 mg daily        2/3/2025   Pre-Op Questionnaire   Have you ever had a heart attack or stroke? No   Have you ever had surgery on your heart or blood vessels, such as a stent placement, a coronary artery bypass, or surgery on an artery in your head, neck, heart, or legs? No   Do you have chest pain with activity? No   Do you have a history of heart failure? No   Do you currently have a cold, bronchitis or symptoms of other infection? No   Do you have a cough, shortness of breath, or wheezing? No   Do you or anyone in your family have previous history of blood clots? (!) YES    Do you or does anyone in your family have a serious bleeding problem such as prolonged bleeding following surgeries or cuts? No   Have you ever had  problems with anemia or been told to take iron pills? No   Have you had any abnormal blood loss such as black, tarry or bloody stools, or abnormal vaginal bleeding? No   Have you ever had a blood transfusion? No   Are you willing to have a blood transfusion if it is medically needed before, during, or after your surgery? Yes   Have you or any of your relatives ever had problems with anesthesia? No   Do you have sleep apnea, excessive snoring or daytime drowsiness? No   Do you have any artifical heart valves or other implanted medical devices like a pacemaker, defibrillator, or continuous glucose monitor? No   Do you have artificial joints? No   Are you allergic to latex? No     Health Care Directive  Patient does not have a Health Care Directive: Discussed advance care planning with patient; information given to patient to review.    Preoperative Review of    reviewed - no record of controlled substances prescribed.      Status of Chronic Conditions:  See problem list for active medical problems.  Problems all longstanding and stable, except as noted/documented.  See ROS for pertinent symptoms related to these conditions.    Patient Active Problem List    Diagnosis Date Noted    Major depression, recurrent (H) 09/06/2024     Priority: Medium    Attention deficit hyperactivity disorder (ADHD), combined type 04/09/2024     Priority: Medium     Diagnosed through psychological eval      Oral contraceptive pill surveillance 12/20/2019     Priority: Medium    Anxiety 09/06/2018     Priority: Medium    CARDIOVASCULAR SCREENING; LDL GOAL LESS THAN 160 10/31/2010     Priority: Medium    Other acne      Priority: Medium      Past Medical History:   Diagnosis Date    Anxiety 09/06/2018    Depressive disorder     Other acne      Past Surgical History:   Procedure Laterality Date    DENTAL SURGERY      EYE SURGERY  12/2018    Lasik     Current Outpatient Medications   Medication Sig Dispense Refill    loratadine (CLARITIN)  "10 MG tablet Take 10 mg by mouth daily      norethindrone-ethinyl estradiol (ALAYCEN 1/35) 1-35 MG-MCG tablet Take 1 tablet by mouth daily 84 tablet 3    sertraline (ZOLOFT) 100 MG tablet Take 1.5 tablets (150 mg) by mouth daily Pt takes 1.5 tab daily 135 tablet 3       Allergies   Allergen Reactions    Sulfa Antibiotics Unknown     Rxn as child - unknown        Social History     Tobacco Use    Smoking status: Never     Passive exposure: Never    Smokeless tobacco: Never   Substance Use Topics    Alcohol use: Yes     Comment: 3 drinks per week     Family History   Problem Relation Age of Onset    Colon Polyps Mother     C.A.D. Father 42        stents    Anxiety Disorder Father     Hyperlipidemia Father     Neurologic Disorder Paternal Grandmother     Prostate Cancer Paternal Grandfather         I don't remember this, but if I told you before, I believe me    Heart Disease Paternal Grandfather     Family History Negative No family hx of      History   Drug Use No             Review of Systems  Constitutional, HEENT, cardiovascular, pulmonary, gi and gu systems are negative, except as otherwise noted.    Objective    /76 (BP Location: Left arm, Patient Position: Sitting, Cuff Size: Adult Regular)   Pulse 78   Temp 98.6  F (37  C) (Temporal)   Resp 16   Ht 1.65 m (5' 4.96\")   Wt 74.4 kg (164 lb)   SpO2 97%   BMI 27.32 kg/m     Estimated body mass index is 27.32 kg/m  as calculated from the following:    Height as of this encounter: 1.65 m (5' 4.96\").    Weight as of this encounter: 74.4 kg (164 lb).  Physical Exam  GENERAL: alert and no distress  EYES: Eyes grossly normal to inspection, PERRL and conjunctivae and sclerae normal  HENT: ear canals and TM's normal, nose and mouth without ulcers or lesions  NECK: no adenopathy, no asymmetry, masses, or scars  RESP: lungs clear to auscultation - no rales, rhonchi or wheezes  CV: regular rate and rhythm, normal S1 S2, no S3 or S4, no murmur, click or rub, no " "peripheral edema  ABDOMEN: soft, nontender, no hepatosplenomegaly, no masses and bowel sounds normal  MS: no gross musculoskeletal defects noted, no edema  SKIN: no suspicious lesions or rashes  NEURO: Normal strength and tone, mentation intact and speech normal  PSYCH: mentation appears normal, affect normal/bright    No results for input(s): \"HGB\", \"PLT\", \"INR\", \"NA\", \"POTASSIUM\", \"CR\", \"A1C\" in the last 8760 hours.     Diagnostics  Labs pending at this time.  Results will be reviewed when available.   EKG required for history of PVCs and not completed in the last 90 days.     Revised Cardiac Risk Index (RCRI)  The patient has the following serious cardiovascular risks for perioperative complications:   - No serious cardiac risks = 0 points     RCRI Interpretation: 0 points: Class I (very low risk - 0.4% complication rate)         Signed Electronically by: Maggy Gonzalez MD  A copy of this evaluation report is provided to the requesting physician.         "

## 2025-02-10 ENCOUNTER — PATIENT OUTREACH (OUTPATIENT)
Dept: CARE COORDINATION | Facility: CLINIC | Age: 35
End: 2025-02-10
Payer: COMMERCIAL

## 2025-02-12 ENCOUNTER — PATIENT OUTREACH (OUTPATIENT)
Dept: CARE COORDINATION | Facility: CLINIC | Age: 35
End: 2025-02-12
Payer: COMMERCIAL

## 2025-02-24 ENCOUNTER — TRANSFERRED RECORDS (OUTPATIENT)
Dept: HEALTH INFORMATION MANAGEMENT | Facility: CLINIC | Age: 35
End: 2025-02-24
Payer: COMMERCIAL

## 2025-03-24 ENCOUNTER — TRANSFERRED RECORDS (OUTPATIENT)
Dept: HEALTH INFORMATION MANAGEMENT | Facility: CLINIC | Age: 35
End: 2025-03-24
Payer: COMMERCIAL

## 2025-04-12 DIAGNOSIS — F41.9 ANXIETY: ICD-10-CM

## 2025-04-14 RX ORDER — SERTRALINE HYDROCHLORIDE 100 MG/1
150 TABLET, FILM COATED ORAL DAILY
Qty: 135 TABLET | Refills: 3 | Status: SHIPPED | OUTPATIENT
Start: 2025-04-14

## 2025-04-22 ENCOUNTER — OFFICE VISIT (OUTPATIENT)
Dept: FAMILY MEDICINE | Facility: CLINIC | Age: 35
End: 2025-04-22
Attending: FAMILY MEDICINE
Payer: COMMERCIAL

## 2025-04-22 VITALS
WEIGHT: 164 LBS | SYSTOLIC BLOOD PRESSURE: 115 MMHG | BODY MASS INDEX: 27.32 KG/M2 | OXYGEN SATURATION: 98 % | DIASTOLIC BLOOD PRESSURE: 77 MMHG | TEMPERATURE: 98.6 F | RESPIRATION RATE: 18 BRPM | HEART RATE: 65 BPM | HEIGHT: 65 IN

## 2025-04-22 DIAGNOSIS — Z00.00 ROUTINE GENERAL MEDICAL EXAMINATION AT A HEALTH CARE FACILITY: Primary | ICD-10-CM

## 2025-04-22 DIAGNOSIS — F41.9 ANXIETY: ICD-10-CM

## 2025-04-22 DIAGNOSIS — F33.41 RECURRENT MAJOR DEPRESSIVE DISORDER, IN PARTIAL REMISSION: ICD-10-CM

## 2025-04-22 DIAGNOSIS — Z30.41 ORAL CONTRACEPTIVE PILL SURVEILLANCE: ICD-10-CM

## 2025-04-22 DIAGNOSIS — F90.2 ATTENTION DEFICIT HYPERACTIVITY DISORDER (ADHD), COMBINED TYPE: ICD-10-CM

## 2025-04-22 PROCEDURE — 99395 PREV VISIT EST AGE 18-39: CPT | Performed by: FAMILY MEDICINE

## 2025-04-22 PROCEDURE — 91320 SARSCV2 VAC 30MCG TRS-SUC IM: CPT | Performed by: FAMILY MEDICINE

## 2025-04-22 PROCEDURE — 90480 ADMN SARSCOV2 VAC 1/ONLY CMP: CPT | Performed by: FAMILY MEDICINE

## 2025-04-22 PROCEDURE — 99214 OFFICE O/P EST MOD 30 MIN: CPT | Mod: 25 | Performed by: FAMILY MEDICINE

## 2025-04-22 PROCEDURE — 96127 BRIEF EMOTIONAL/BEHAV ASSMT: CPT | Performed by: FAMILY MEDICINE

## 2025-04-22 RX ORDER — DEXTROAMPHETAMINE SACCHARATE, AMPHETAMINE ASPARTATE MONOHYDRATE, DEXTROAMPHETAMINE SULFATE AND AMPHETAMINE SULFATE 6.25; 6.25; 6.25; 6.25 MG/1; MG/1; MG/1; MG/1
25 CAPSULE, EXTENDED RELEASE ORAL DAILY
Qty: 30 CAPSULE | Refills: 0 | Status: SHIPPED | OUTPATIENT
Start: 2025-06-21 | End: 2025-07-21

## 2025-04-22 RX ORDER — DEXTROAMPHETAMINE SACCHARATE, AMPHETAMINE ASPARTATE MONOHYDRATE, DEXTROAMPHETAMINE SULFATE AND AMPHETAMINE SULFATE 6.25; 6.25; 6.25; 6.25 MG/1; MG/1; MG/1; MG/1
25 CAPSULE, EXTENDED RELEASE ORAL DAILY
Qty: 30 CAPSULE | Refills: 0 | Status: SHIPPED | OUTPATIENT
Start: 2025-04-22 | End: 2025-05-22

## 2025-04-22 RX ORDER — DEXTROAMPHETAMINE SACCHARATE, AMPHETAMINE ASPARTATE MONOHYDRATE, DEXTROAMPHETAMINE SULFATE AND AMPHETAMINE SULFATE 6.25; 6.25; 6.25; 6.25 MG/1; MG/1; MG/1; MG/1
25 CAPSULE, EXTENDED RELEASE ORAL DAILY
Qty: 30 CAPSULE | Refills: 0 | Status: SHIPPED | OUTPATIENT
Start: 2025-05-22 | End: 2025-06-21

## 2025-04-22 RX ORDER — NORETHINDRONE AND ETHINYL ESTRADIOL 1 MG-35MCG
1 KIT ORAL DAILY
Qty: 84 TABLET | Refills: 3 | Status: SHIPPED | OUTPATIENT
Start: 2025-04-22

## 2025-04-22 SDOH — HEALTH STABILITY: PHYSICAL HEALTH: ON AVERAGE, HOW MANY DAYS PER WEEK DO YOU ENGAGE IN MODERATE TO STRENUOUS EXERCISE (LIKE A BRISK WALK)?: 3 DAYS

## 2025-04-22 ASSESSMENT — PATIENT HEALTH QUESTIONNAIRE - PHQ9
10. IF YOU CHECKED OFF ANY PROBLEMS, HOW DIFFICULT HAVE THESE PROBLEMS MADE IT FOR YOU TO DO YOUR WORK, TAKE CARE OF THINGS AT HOME, OR GET ALONG WITH OTHER PEOPLE: SOMEWHAT DIFFICULT
SUM OF ALL RESPONSES TO PHQ QUESTIONS 1-9: 7
SUM OF ALL RESPONSES TO PHQ QUESTIONS 1-9: 7

## 2025-04-22 ASSESSMENT — SOCIAL DETERMINANTS OF HEALTH (SDOH): HOW OFTEN DO YOU GET TOGETHER WITH FRIENDS OR RELATIVES?: ONCE A WEEK

## 2025-04-22 ASSESSMENT — PAIN SCALES - GENERAL: PAINLEVEL_OUTOF10: MILD PAIN (1)

## 2025-04-22 NOTE — PROGRESS NOTES
"Preventive Care Visit  [facility]  Niharika Stewart MD, Family Medicine  Apr 22, 2025      Assessment & Plan     (Z00.00) Routine general medical examination at a health care facility  (primary encounter diagnosis)  Comment: healthy  Plan: PRIMARY CARE FOLLOW-UP SCHEDULING        Health Care Maintenance reviewed, measures not yet completed have been discussed.      (Z30.41) Oral contraceptive pill surveillance  Comment:   Plan: ALAYCEN 1/35 1-35 MG-MCG tablet        Will see if her insurance covers branded ocp's    (F33.41) Recurrent major depressive disorder, in partial remission  Comment:   Plan: Continue current medication      (F90.2) Attention deficit hyperactivity disorder (ADHD), combined type  Comment: adjusted dose to 25mg  Plan: amphetamine-dextroamphetamine (ADDERALL XR) 25         MG 24 hr capsule, amphetamine-dextroamphetamine        (ADDERALL XR) 25 MG 24 hr capsule,         amphetamine-dextroamphetamine (ADDERALL XR) 25         MG 24 hr capsule        Three month refills provided.    (F41.9) Anxiety  Comment: stable  Plan:Continue current medication              BMI  Estimated body mass index is 27.29 kg/m  as calculated from the following:    Height as of this encounter: 1.651 m (5' 5\").    Weight as of this encounter: 74.4 kg (164 lb).   Weight management plan: Discussed healthy diet and exercise guidelines    Counseling  Appropriate preventive services were addressed with this patient via screening, questionnaire, or discussion as appropriate for fall prevention, nutrition, physical activity, Tobacco-use cessation, social engagement, weight loss and cognition.  Checklist reviewing preventive services available has been given to the patient.  Reviewed patient's diet, addressing concerns and/or questions.   She is at risk for lack of exercise and has been provided with information to increase physical activity for the benefit of her well-being.   She is at risk for " psychosocial distress and has been provided with information to reduce risk.   The patient's PHQ-9 score is consistent with mild depression. She was provided with information regarding depression.       Follow-up    Follow-up Visit   Expected date:  Apr 22, 2026 (Approximate)      Follow Up Appointment Details:     Follow-up with whom?: PCP    Follow-Up for what?: Adult Preventive    How?: In Person                 Subjective   Martine is a 34 year old, presenting for the following:  Physical        4/22/2025     3:18 PM   Additional Questions   Roomed by Bishop TEJEDA MA   Accompanied by Self          HPI     Mood is stable.    Finds that the adderrall 20mg doesn't keep her symptoms controlled throughout the whole day, but sometimes 30mg interferes with sleep.    Prefers the [facility] branded ocp's. Feels better on them.  But regardless periods are monthly and stable.      Advance Care Planning    Discussed advance care planning with patient; informed AVS has link to [employer].        4/22/2025   General Health   How would you rate your overall physical health? Good   Feel stress (tense, anxious, or unable to sleep) Very much   (!) STRESS CONCERN      4/22/2025   Nutrition   Three or more servings of calcium each day? Yes   Diet: Regular (no restrictions)   How many servings of fruit and vegetables per day? (!) 2-3   How many sweetened beverages each day? 0-1         4/22/2025   Exercise   Days per week of moderate/strenous exercise 3 days         4/22/2025   Social Factors   Frequency of gathering with friends or relatives Once a week   Worry food won't last until get money to buy more No   Food not last or not have enough money for food? No   Do you have housing? (Housing is defined as stable permanent housing and does not include staying ouside in a car, in a tent, in an abandoned building, in an overnight shelter, or couch-surfing.) Yes   Are you worried about losing your housing? No   Lack of transportation? No  "  Unable to get utilities (heat,electricity)? No         4/22/2025   Dental   Dentist two times every year? Yes       Today's PHQ-9 Score:       4/22/2025     3:09 PM   PHQ-9 SCORE   PHQ-9 Total Score MyChart 7 (Mild depression)   PHQ-9 Total Score 7        Patient-reported         4/22/2025   Substance Use   Alcohol more than 3/day or more than 7/wk No   Do you use any other substances recreationally? No     Social History     Tobacco Use    Smoking status: Never     Passive exposure: Never    Smokeless tobacco: Never   Vaping Use    Vaping status: Never Used   Substance Use Topics    Alcohol use: Yes     Comment: 3 drinks per week    Drug use: No          Mammogram Screening - Patient under 40 years of age: Routine Mammogram Screening not recommended.         4/22/2025   STI Screening   New sexual partner(s) since last STI/HIV test? No     History of abnormal Pap smear: No - age 30- 64 PAP with HPV every 3 years recommended        Latest Ref Rng & Units 2/28/2023     3:20 PM 9/6/2018     4:34 PM 3/4/2016    12:00 AM   PAP / HPV   PAP  Negative for Intraepithelial Lesion or Malignancy (NILM)      PAP (Historical)   NIL  NIL    HPV 16 DNA Negative Negative      HPV 18 DNA Negative Negative      Other HR HPV Negative Negative              4/22/2025   Contraception/Family Planning   Questions about contraception or family planning No        Reviewed and updated as needed this visit by Provider      Problems                     Review of Systems  Constitutional, HEENT, cardiovascular, pulmonary, gi and gu systems are negative, except as otherwise noted.     Objective    Exam  /77 (BP Location: Left arm, Patient Position: Chair, Cuff Size: Adult Regular)   Pulse 65   Temp 98.6  F (37  C) (Oral)   Resp 18   Ht 1.651 m (5' 5\")   Wt 74.4 kg (164 lb)   LMP 04/08/2025 (Within Days)   SpO2 98%   Breastfeeding No   BMI 27.29 kg/m     Estimated body mass index is 27.29 kg/m  as calculated from the following:    " "Height as of this encounter: 1.651 m (5' 5\").    Weight as of this encounter: 74.4 kg (164 lb).    Physical Exam  GENERAL: alert and no distress  EYES: Eyes grossly normal to inspection, PERRL and conjunctivae and sclerae normal  HENT: normal cephalic/atraumatic and ear canals and TM's normal  NECK: no adenopathy, no asymmetry, masses, or scars  RESP: lungs clear to auscultation - no rales, rhonchi or wheezes  CV: regular rate and rhythm, normal S1 S2, no S3 or S4, no murmur, click or rub, no peripheral edema  ABDOMEN: soft, nontender, no hepatosplenomegaly, no masses and bowel sounds normal  MS: no gross musculoskeletal defects noted, no edema  SKIN: no suspicious lesions or rashes  NEURO: Normal strength and tone, mentation intact and speech normal  PSYCH: mentation appears normal, affect normal/bright        Signed Electronically by: Niharika Stewart MD    "

## 2025-04-22 NOTE — NURSING NOTE
Prior to immunization administration, verified patients identity using patient s name and date of birth. Please see Immunization Activity for additional information.     Screening Questionnaire for Adult Immunization    Are you sick today?   No   Do you have allergies to medications, food, a vaccine component or latex?   No   Have you ever had a serious reaction after receiving a vaccination?   No   Do you have a long-term health problem with heart, lung, kidney, or metabolic disease (e.g., diabetes), asthma, a blood disorder, no spleen, complement component deficiency, a cochlear implant, or a spinal fluid leak?  Are you on long-term aspirin therapy?   No   Do you have cancer, leukemia, HIV/AIDS, or any other immune system problem?   No   Do you have a parent, brother, or sister with an immune system problem?   No   In the past 3 months, have you taken medications that affect  your immune system, such as prednisone, other steroids, or anticancer drugs; drugs for the treatment of rheumatoid arthritis, Crohn s disease, or psoriasis; or have you had radiation treatments?   No   Have you had a seizure, or a brain or other nervous system problem?   No   During the past year, have you received a transfusion of blood or blood    products, or been given immune (gamma) globulin or antiviral drug?   No   For women: Are you pregnant or is there a chance you could become       pregnant during the next month?   No   Have you received any vaccinations in the past 4 weeks?   No     Immunization questionnaire answers were all negative.      Patient instructed to remain in clinic for 15 minutes afterwards, and to report any adverse reactions.     Screening performed by Connie Infante MA on 4/22/2025 at 4:08 PM.

## 2025-04-22 NOTE — PATIENT INSTRUCTIONS
Patient Education   Preventive Care Advice   This is general advice given by our system to help you stay healthy. However, your care team may have specific advice just for you. Please talk to your care team about your preventive care needs.  Nutrition  Eat 5 or more servings of fruits and vegetables each day.  Try wheat bread, brown rice and whole grain pasta (instead of white bread, rice, and pasta).  Get enough calcium and vitamin D. Check the label on foods and aim for 100% of the RDA (recommended daily allowance).  Lifestyle  Exercise at least 150 minutes each week  (30 minutes a day, 5 days a week).  Do muscle strengthening activities 2 days a week. These help control your weight and prevent disease.  No smoking.  Wear sunscreen to prevent skin cancer.  Have a dental exam and cleaning every 6 months.  Yearly exams  See your health care team every year to talk about:  Any changes in your health.  Any medicines your care team has prescribed.  Preventive care, family planning, and ways to prevent chronic diseases.  Shots (vaccines)   HPV shots (up to age 26), if you've never had them before.  Hepatitis B shots (up to age 59), if you've never had them before.  COVID-19 shot: Get this shot when it's due.  Flu shot: Get a flu shot every year.  Tetanus shot: Get a tetanus shot every 10 years.  Pneumococcal, hepatitis A, and RSV shots: Ask your care team if you need these based on your risk.  Shingles shot (for age 50 and up)  General health tests  Diabetes screening:  Starting at age 35, Get screened for diabetes at least every 3 years.  If you are younger than age 35, ask your care team if you should be screened for diabetes.  Cholesterol test: At age 39, start having a cholesterol test every 5 years, or more often if advised.  Bone density scan (DEXA): At age 50, ask your care team if you should have this scan for osteoporosis (brittle bones).  Hepatitis C: Get tested at least once in your life.  STIs (sexually  transmitted infections)  Before age 24: Ask your care team if you should be screened for STIs.  After age 24: Get screened for STIs if you're at risk. You are at risk for STIs (including HIV) if:  You are sexually active with more than one person.  You don't use condoms every time.  You or a partner was diagnosed with a sexually transmitted infection.  If you are at risk for HIV, ask about PrEP medicine to prevent HIV.  Get tested for HIV at least once in your life, whether you are at risk for HIV or not.  Cancer screening tests  Cervical cancer screening: If you have a cervix, begin getting regular cervical cancer screening tests starting at age 21.  Breast cancer scan (mammogram): If you've ever had breasts, begin having regular mammograms starting at age 40. This is a scan to check for breast cancer.  Colon cancer screening: It is important to start screening for colon cancer at age 45.  Have a colonoscopy test every 10 years (or more often if you're at risk) Or, ask your provider about stool tests like a FIT test every year or Cologuard test every 3 years.  To learn more about your testing options, visit:   .  For help making a decision, visit:   https://bit.ly/xe41780.  Prostate cancer screening test: If you have a prostate, ask your care team if a prostate cancer screening test (PSA) at age 55 is right for you.  Lung cancer screening: If you are a current or former smoker ages 50 to 80, ask your care team if ongoing lung cancer screenings are right for you.  For informational purposes only. Not to replace the advice of your health care provider. Copyright   2023 [facility]. All rights reserved. Clinically reviewed by the [facility]. [employer] 620500 - REV 01/24.  Learning About Stress  What is stress?     Stress is your body's response to a hard situation. Your body can have a physical, emotional, or mental response. Stress is a fact of life for most people, and it  affects everyone differently. What causes stress for you may not be stressful for someone else.  A lot of things can cause stress. You may feel stress when you go on a job interview, take a test, or run a race. This kind of short-term stress is normal and even useful. It can help you if you need to work hard or react quickly. For example, stress can help you finish an important job on time.  Long-term stress is caused by ongoing stressful situations or events. Examples of long-term stress include long-term health problems, ongoing problems at work, or conflicts in your family. Long-term stress can harm your health.  How does stress affect your health?  When you are stressed, your body responds as though you are in danger. It makes hormones that speed up your heart, make you breathe faster, and give you a burst of energy. This is called the fight-or-flight stress response. If the stress is over quickly, your body goes back to normal and no harm is done.  But if stress happens too often or lasts too long, it can have bad effects. Long-term stress can make you more likely to get sick, and it can make symptoms of some diseases worse. If you tense up when you are stressed, you may develop neck, shoulder, or low back pain. Stress is linked to high blood pressure and heart disease.  Stress also harms your emotional health. It can make you judge, tense, or depressed. Your relationships may suffer, and you may not do well at work or school.  What can you do to manage stress?  You can try these things to help manage stress:   Do something active. Exercise or activity can help reduce stress. Walking is a great way to get started. Even everyday activities such as housecleaning or yard work can help.  Try yoga or steffen chi. These techniques combine exercise and meditation. You may need some training at first to learn them.  Do something you enjoy. For example, listen to music or go to a movie. Practice your hobby or do volunteer  "work.  Meditate. This can help you relax, because you are not worrying about what happened before or what may happen in the future.  Do guided imagery. Imagine yourself in any setting that helps you feel calm. You can use online videos, books, or a teacher to guide you.  Do breathing exercises. For example:  From a standing position, bend forward from the waist with your knees slightly bent. Let your arms dangle close to the floor.  Breathe in slowly and deeply as you return to a standing position. Roll up slowly and lift your head last.  Hold your breath for just a few seconds in the standing position.  Breathe out slowly and bend forward from the waist.  Let your feelings out. Talk, laugh, cry, and express anger when you need to. Talking with supportive friends or family, a counselor, or a butch leader about your feelings is a healthy way to relieve stress. Avoid discussing your feelings with people who make you feel worse.  Write. It may help to write about things that are bothering you. This helps you find out how much stress you feel and what is causing it. When you know this, you can find better ways to cope.  What can you do to prevent stress?  You might try some of these things to help prevent stress:  Manage your time. This helps you find time to do the things you want and need to do.  Get enough sleep. Your body recovers from the stresses of the day while you are sleeping.  Get support. Your family, friends, and community can make a difference in how you experience stress.  Limit your news feed. Avoid or limit time on social media or news that may make you feel stressed.  Do something active. Exercise or activity can help reduce stress. Walking is a great way to get started.  Where can you learn more?  Go to https://www.[employer].net/patiented  Enter N032 in the search box to learn more about \"Learning About Stress.\"  Current as of: October 24, 2024  Content Version: 14.4 2024-2025 [employer]   Care instructions adapted under license by your healthcare professional. If you have questions about a medical condition or this instruction, always ask your healthcare professional. [employer] disclaims any warranty or liability for your use of this information.

## 2025-05-01 DIAGNOSIS — Z30.41 ORAL CONTRACEPTIVE PILL SURVEILLANCE: ICD-10-CM

## 2025-05-01 RX ORDER — NORETHINDRONE AND ETHINYL ESTRADIOL 1 MG-35MCG
1 KIT ORAL DAILY
Qty: 84 TABLET | Refills: 3 | OUTPATIENT
Start: 2025-05-01

## 2025-05-08 ENCOUNTER — TRANSFERRED RECORDS (OUTPATIENT)
Dept: HEALTH INFORMATION MANAGEMENT | Facility: CLINIC | Age: 35
End: 2025-05-08
Payer: COMMERCIAL

## 2025-06-16 ENCOUNTER — TRANSFERRED RECORDS (OUTPATIENT)
Dept: HEALTH INFORMATION MANAGEMENT | Facility: CLINIC | Age: 35
End: 2025-06-16